# Patient Record
Sex: MALE | Race: WHITE
[De-identification: names, ages, dates, MRNs, and addresses within clinical notes are randomized per-mention and may not be internally consistent; named-entity substitution may affect disease eponyms.]

---

## 2020-08-19 ENCOUNTER — HOSPITAL ENCOUNTER (INPATIENT)
Dept: HOSPITAL 96 - M.ERS | Age: 55
LOS: 2 days | Discharge: HOME | DRG: 61 | End: 2020-08-21
Attending: INTERNAL MEDICINE | Admitting: INTERNAL MEDICINE
Payer: COMMERCIAL

## 2020-08-19 VITALS — DIASTOLIC BLOOD PRESSURE: 88 MMHG | SYSTOLIC BLOOD PRESSURE: 125 MMHG

## 2020-08-19 VITALS — SYSTOLIC BLOOD PRESSURE: 145 MMHG | DIASTOLIC BLOOD PRESSURE: 98 MMHG

## 2020-08-19 VITALS — SYSTOLIC BLOOD PRESSURE: 120 MMHG | DIASTOLIC BLOOD PRESSURE: 85 MMHG

## 2020-08-19 VITALS — DIASTOLIC BLOOD PRESSURE: 90 MMHG | SYSTOLIC BLOOD PRESSURE: 145 MMHG

## 2020-08-19 VITALS — DIASTOLIC BLOOD PRESSURE: 94 MMHG | SYSTOLIC BLOOD PRESSURE: 129 MMHG

## 2020-08-19 VITALS — DIASTOLIC BLOOD PRESSURE: 79 MMHG | SYSTOLIC BLOOD PRESSURE: 135 MMHG

## 2020-08-19 VITALS — DIASTOLIC BLOOD PRESSURE: 88 MMHG | SYSTOLIC BLOOD PRESSURE: 134 MMHG

## 2020-08-19 VITALS — HEIGHT: 70 IN | BODY MASS INDEX: 33.6 KG/M2 | WEIGHT: 234.7 LBS

## 2020-08-19 VITALS — DIASTOLIC BLOOD PRESSURE: 89 MMHG | SYSTOLIC BLOOD PRESSURE: 138 MMHG

## 2020-08-19 VITALS — SYSTOLIC BLOOD PRESSURE: 143 MMHG | DIASTOLIC BLOOD PRESSURE: 101 MMHG

## 2020-08-19 VITALS — DIASTOLIC BLOOD PRESSURE: 81 MMHG | SYSTOLIC BLOOD PRESSURE: 132 MMHG

## 2020-08-19 VITALS — DIASTOLIC BLOOD PRESSURE: 94 MMHG | SYSTOLIC BLOOD PRESSURE: 138 MMHG

## 2020-08-19 VITALS — SYSTOLIC BLOOD PRESSURE: 219 MMHG | DIASTOLIC BLOOD PRESSURE: 132 MMHG

## 2020-08-19 DIAGNOSIS — E66.9: ICD-10-CM

## 2020-08-19 DIAGNOSIS — D75.1: ICD-10-CM

## 2020-08-19 DIAGNOSIS — I16.1: ICD-10-CM

## 2020-08-19 DIAGNOSIS — Z20.828: ICD-10-CM

## 2020-08-19 DIAGNOSIS — Z86.73: ICD-10-CM

## 2020-08-19 DIAGNOSIS — I63.9: Primary | ICD-10-CM

## 2020-08-19 DIAGNOSIS — I12.9: ICD-10-CM

## 2020-08-19 DIAGNOSIS — E11.51: ICD-10-CM

## 2020-08-19 DIAGNOSIS — E11.65: ICD-10-CM

## 2020-08-19 DIAGNOSIS — R27.0: ICD-10-CM

## 2020-08-19 DIAGNOSIS — N18.3: ICD-10-CM

## 2020-08-19 DIAGNOSIS — Z88.8: ICD-10-CM

## 2020-08-19 DIAGNOSIS — E11.40: ICD-10-CM

## 2020-08-19 DIAGNOSIS — E11.22: ICD-10-CM

## 2020-08-19 DIAGNOSIS — N17.0: ICD-10-CM

## 2020-08-19 DIAGNOSIS — Z79.899: ICD-10-CM

## 2020-08-19 DIAGNOSIS — Z79.4: ICD-10-CM

## 2020-08-19 DIAGNOSIS — I65.29: ICD-10-CM

## 2020-08-19 LAB
ABSOLUTE BASOPHILS: 0 THOU/UL (ref 0–0.2)
ABSOLUTE EOSINOPHILS: 0.1 THOU/UL (ref 0–0.7)
ABSOLUTE MONOCYTES: 0.5 THOU/UL (ref 0–1.2)
ALBUMIN SERPL-MCNC: 4.2 G/DL (ref 3.4–5)
ALP SERPL-CCNC: 86 U/L (ref 46–116)
ALT SERPL-CCNC: 21 U/L (ref 30–65)
ANION GAP SERPL CALC-SCNC: 11 MMOL/L (ref 7–16)
APTT BLD: 28.4 SECONDS (ref 25–31.3)
AST SERPL-CCNC: 16 U/L (ref 15–37)
BASOPHILS NFR BLD AUTO: 0.5 %
BILIRUB SERPL-MCNC: 0.5 MG/DL
BUN SERPL-MCNC: 17 MG/DL (ref 7–18)
CALCIUM SERPL-MCNC: 8.9 MG/DL (ref 8.5–10.1)
CHLORIDE SERPL-SCNC: 101 MMOL/L (ref 98–107)
CO2 SERPL-SCNC: 26 MMOL/L (ref 21–32)
CREAT SERPL-MCNC: 1.5 MG/DL (ref 0.6–1.3)
EOSINOPHIL NFR BLD: 2.1 %
FIBRINOGEN PPP-MCNC: 322 MG/DL (ref 200–340)
GLUCOSE SERPL-MCNC: 234 MG/DL (ref 70–99)
GRANULOCYTES NFR BLD MANUAL: 69.2 %
HCT VFR BLD CALC: 54.2 % (ref 42–52)
HGB BLD-MCNC: 19 GM/DL (ref 14–18)
INR PPP: 1.1
LYMPHOCYTES # BLD: 1.4 THOU/UL (ref 0.8–5.3)
LYMPHOCYTES NFR BLD AUTO: 20.9 %
MCH RBC QN AUTO: 30.8 PG (ref 26–34)
MCHC RBC AUTO-ENTMCNC: 35.1 G/DL (ref 28–37)
MCV RBC: 87.7 FL (ref 80–100)
MONOCYTES NFR BLD: 7.3 %
MPV: 7.9 FL. (ref 7.2–11.1)
NEUTROPHILS # BLD: 4.8 THOU/UL (ref 1.6–8.1)
NUCLEATED RBCS: 0 /100WBC
PLATELET COUNT*: 161 THOU/UL (ref 150–400)
POTASSIUM SERPL-SCNC: 3.7 MMOL/L (ref 3.5–5.1)
PROT SERPL-MCNC: 7.5 G/DL (ref 6.4–8.2)
PROTHROMBIN TIME: 11 SECONDS (ref 9.2–11.5)
RBC # BLD AUTO: 6.18 MIL/UL (ref 4.5–6)
RDW-CV: 15 % (ref 10.5–14.5)
SODIUM SERPL-SCNC: 138 MMOL/L (ref 136–145)
WBC # BLD AUTO: 6.9 THOU/UL (ref 4–11)

## 2020-08-20 VITALS — SYSTOLIC BLOOD PRESSURE: 128 MMHG | DIASTOLIC BLOOD PRESSURE: 82 MMHG

## 2020-08-20 VITALS — SYSTOLIC BLOOD PRESSURE: 124 MMHG | DIASTOLIC BLOOD PRESSURE: 82 MMHG

## 2020-08-20 VITALS — DIASTOLIC BLOOD PRESSURE: 86 MMHG | SYSTOLIC BLOOD PRESSURE: 137 MMHG

## 2020-08-20 VITALS — SYSTOLIC BLOOD PRESSURE: 140 MMHG | DIASTOLIC BLOOD PRESSURE: 98 MMHG

## 2020-08-20 VITALS — SYSTOLIC BLOOD PRESSURE: 150 MMHG | DIASTOLIC BLOOD PRESSURE: 96 MMHG

## 2020-08-20 VITALS — SYSTOLIC BLOOD PRESSURE: 145 MMHG | DIASTOLIC BLOOD PRESSURE: 85 MMHG

## 2020-08-20 VITALS — DIASTOLIC BLOOD PRESSURE: 78 MMHG | SYSTOLIC BLOOD PRESSURE: 131 MMHG

## 2020-08-20 VITALS — SYSTOLIC BLOOD PRESSURE: 127 MMHG | DIASTOLIC BLOOD PRESSURE: 84 MMHG

## 2020-08-20 VITALS — DIASTOLIC BLOOD PRESSURE: 68 MMHG | SYSTOLIC BLOOD PRESSURE: 113 MMHG

## 2020-08-20 VITALS — DIASTOLIC BLOOD PRESSURE: 80 MMHG | SYSTOLIC BLOOD PRESSURE: 123 MMHG

## 2020-08-20 VITALS — SYSTOLIC BLOOD PRESSURE: 160 MMHG | DIASTOLIC BLOOD PRESSURE: 103 MMHG

## 2020-08-20 VITALS — SYSTOLIC BLOOD PRESSURE: 162 MMHG | DIASTOLIC BLOOD PRESSURE: 100 MMHG

## 2020-08-20 VITALS — SYSTOLIC BLOOD PRESSURE: 141 MMHG | DIASTOLIC BLOOD PRESSURE: 93 MMHG

## 2020-08-20 VITALS — SYSTOLIC BLOOD PRESSURE: 134 MMHG | DIASTOLIC BLOOD PRESSURE: 85 MMHG

## 2020-08-20 VITALS — SYSTOLIC BLOOD PRESSURE: 176 MMHG | DIASTOLIC BLOOD PRESSURE: 101 MMHG

## 2020-08-20 VITALS — DIASTOLIC BLOOD PRESSURE: 88 MMHG | SYSTOLIC BLOOD PRESSURE: 135 MMHG

## 2020-08-20 VITALS — SYSTOLIC BLOOD PRESSURE: 133 MMHG | DIASTOLIC BLOOD PRESSURE: 74 MMHG

## 2020-08-20 VITALS — SYSTOLIC BLOOD PRESSURE: 148 MMHG | DIASTOLIC BLOOD PRESSURE: 101 MMHG

## 2020-08-20 VITALS — SYSTOLIC BLOOD PRESSURE: 129 MMHG | DIASTOLIC BLOOD PRESSURE: 93 MMHG

## 2020-08-20 VITALS — DIASTOLIC BLOOD PRESSURE: 91 MMHG | SYSTOLIC BLOOD PRESSURE: 153 MMHG

## 2020-08-20 VITALS — DIASTOLIC BLOOD PRESSURE: 87 MMHG | SYSTOLIC BLOOD PRESSURE: 136 MMHG

## 2020-08-20 VITALS — DIASTOLIC BLOOD PRESSURE: 88 MMHG | SYSTOLIC BLOOD PRESSURE: 136 MMHG

## 2020-08-20 VITALS — SYSTOLIC BLOOD PRESSURE: 147 MMHG | DIASTOLIC BLOOD PRESSURE: 88 MMHG

## 2020-08-20 VITALS — SYSTOLIC BLOOD PRESSURE: 139 MMHG | DIASTOLIC BLOOD PRESSURE: 103 MMHG

## 2020-08-20 VITALS — DIASTOLIC BLOOD PRESSURE: 98 MMHG | SYSTOLIC BLOOD PRESSURE: 143 MMHG

## 2020-08-20 VITALS — DIASTOLIC BLOOD PRESSURE: 103 MMHG | SYSTOLIC BLOOD PRESSURE: 162 MMHG

## 2020-08-20 VITALS — DIASTOLIC BLOOD PRESSURE: 67 MMHG | SYSTOLIC BLOOD PRESSURE: 133 MMHG

## 2020-08-20 VITALS — SYSTOLIC BLOOD PRESSURE: 121 MMHG | DIASTOLIC BLOOD PRESSURE: 70 MMHG

## 2020-08-20 VITALS — DIASTOLIC BLOOD PRESSURE: 82 MMHG | SYSTOLIC BLOOD PRESSURE: 144 MMHG

## 2020-08-20 VITALS — SYSTOLIC BLOOD PRESSURE: 125 MMHG | DIASTOLIC BLOOD PRESSURE: 85 MMHG

## 2020-08-20 LAB
ANION GAP SERPL CALC-SCNC: 10 MMOL/L (ref 7–16)
BUN SERPL-MCNC: 16 MG/DL (ref 7–18)
CALCIUM SERPL-MCNC: 8.2 MG/DL (ref 8.5–10.1)
CHLORIDE SERPL-SCNC: 104 MMOL/L (ref 98–107)
CHOLEST SERPL-MCNC: 171 MG/DL (ref ?–200)
CO2 SERPL-SCNC: 27 MMOL/L (ref 21–32)
CREAT SERPL-MCNC: 1.3 MG/DL (ref 0.6–1.3)
EST. AVERAGE GLUCOSE BLD GHB EST-MCNC: 134 MG/DL
GLUCOSE SERPL-MCNC: 154 MG/DL (ref 70–99)
GLYCOHEMOGLOBIN (HGB A1C): 6.3 % (ref 4.8–5.6)
HCT VFR BLD CALC: 49.4 % (ref 42–52)
HDLC SERPL-MCNC: 26 MG/DL (ref 40–?)
HGB BLD-MCNC: 17.4 GM/DL (ref 14–18)
MAGNESIUM SERPL-MCNC: 1.7 MG/DL (ref 1.8–2.4)
MCH RBC QN AUTO: 30.8 PG (ref 26–34)
MCHC RBC AUTO-ENTMCNC: 35.1 G/DL (ref 28–37)
MCV RBC: 87.6 FL (ref 80–100)
MPV: 8.4 FL. (ref 7.2–11.1)
PLATELET COUNT*: 160 THOU/UL (ref 150–400)
POTASSIUM SERPL-SCNC: 3.7 MMOL/L (ref 3.5–5.1)
RBC # BLD AUTO: 5.64 MIL/UL (ref 4.5–6)
RDW-CV: 14.8 % (ref 10.5–14.5)
SERUM ASSESSMENT: (no result)
SODIUM SERPL-SCNC: 141 MMOL/L (ref 136–145)
TC:HDL: 6.6 RATIO
TRIGL SERPL-MCNC: 536 MG/DL (ref ?–150)
VLDLC SERPL CALC-MCNC: 107 MG/DL (ref ?–40)
WBC # BLD AUTO: 6.8 THOU/UL (ref 4–11)

## 2020-08-21 VITALS — SYSTOLIC BLOOD PRESSURE: 138 MMHG | DIASTOLIC BLOOD PRESSURE: 84 MMHG

## 2020-08-21 VITALS — SYSTOLIC BLOOD PRESSURE: 156 MMHG | DIASTOLIC BLOOD PRESSURE: 108 MMHG

## 2020-08-21 VITALS — SYSTOLIC BLOOD PRESSURE: 186 MMHG | DIASTOLIC BLOOD PRESSURE: 117 MMHG

## 2020-08-21 VITALS — SYSTOLIC BLOOD PRESSURE: 154 MMHG | DIASTOLIC BLOOD PRESSURE: 101 MMHG

## 2020-08-21 VITALS — DIASTOLIC BLOOD PRESSURE: 84 MMHG | SYSTOLIC BLOOD PRESSURE: 138 MMHG

## 2020-08-21 VITALS — SYSTOLIC BLOOD PRESSURE: 171 MMHG | DIASTOLIC BLOOD PRESSURE: 108 MMHG

## 2020-08-21 LAB
ANION GAP SERPL CALC-SCNC: 6 MMOL/L (ref 7–16)
BUN SERPL-MCNC: 16 MG/DL (ref 7–18)
CALCIUM SERPL-MCNC: 8.7 MG/DL (ref 8.5–10.1)
CHLORIDE SERPL-SCNC: 103 MMOL/L (ref 98–107)
CO2 SERPL-SCNC: 32 MMOL/L (ref 21–32)
CREAT SERPL-MCNC: 1.5 MG/DL (ref 0.6–1.3)
GLUCOSE SERPL-MCNC: 116 MG/DL (ref 70–99)
HCT VFR BLD CALC: 48.8 % (ref 42–52)
HGB BLD-MCNC: 17 GM/DL (ref 14–18)
MAGNESIUM SERPL-MCNC: 1.8 MG/DL (ref 1.8–2.4)
MCH RBC QN AUTO: 30.6 PG (ref 26–34)
MCHC RBC AUTO-ENTMCNC: 34.9 G/DL (ref 28–37)
MCV RBC: 87.6 FL (ref 80–100)
MPV: 8 FL. (ref 7.2–11.1)
PLATELET COUNT*: 154 THOU/UL (ref 150–400)
POTASSIUM SERPL-SCNC: 3.6 MMOL/L (ref 3.5–5.1)
RBC # BLD AUTO: 5.58 MIL/UL (ref 4.5–6)
RDW-CV: 15.1 % (ref 10.5–14.5)
SODIUM SERPL-SCNC: 141 MMOL/L (ref 136–145)
WBC # BLD AUTO: 6 THOU/UL (ref 4–11)

## 2020-09-25 ENCOUNTER — HOSPITAL ENCOUNTER (OUTPATIENT)
Dept: HOSPITAL 96 - M.MRI | Age: 55
End: 2020-09-25
Attending: FAMILY MEDICINE
Payer: COMMERCIAL

## 2020-09-25 DIAGNOSIS — M48.061: ICD-10-CM

## 2020-09-25 DIAGNOSIS — M51.27: Primary | ICD-10-CM

## 2020-09-25 DIAGNOSIS — M47.817: ICD-10-CM

## 2020-09-25 DIAGNOSIS — M25.78: ICD-10-CM

## 2020-10-12 ENCOUNTER — HOSPITAL ENCOUNTER (OUTPATIENT)
Dept: HOSPITAL 96 - M.PC | Age: 55
End: 2020-10-12
Attending: PHYSICAL MEDICINE & REHABILITATION
Payer: COMMERCIAL

## 2020-10-12 DIAGNOSIS — M54.5: Primary | ICD-10-CM

## 2020-10-29 ENCOUNTER — HOSPITAL ENCOUNTER (OUTPATIENT)
Dept: HOSPITAL 61 - PNCL | Age: 55
End: 2020-10-29
Attending: ANESTHESIOLOGY
Payer: COMMERCIAL

## 2020-10-29 DIAGNOSIS — Z79.82: ICD-10-CM

## 2020-10-29 DIAGNOSIS — Z79.899: ICD-10-CM

## 2020-10-29 DIAGNOSIS — M48.061: Primary | ICD-10-CM

## 2020-10-29 DIAGNOSIS — M79.604: ICD-10-CM

## 2020-10-29 DIAGNOSIS — Z79.84: ICD-10-CM

## 2020-10-29 DIAGNOSIS — Z82.49: ICD-10-CM

## 2020-10-29 DIAGNOSIS — E11.9: ICD-10-CM

## 2020-10-29 DIAGNOSIS — M19.90: ICD-10-CM

## 2020-10-29 DIAGNOSIS — Z83.3: ICD-10-CM

## 2020-10-29 DIAGNOSIS — I10: ICD-10-CM

## 2020-10-29 DIAGNOSIS — I73.89: ICD-10-CM

## 2020-10-29 PROCEDURE — 62323 NJX INTERLAMINAR LMBR/SAC: CPT

## 2020-10-29 NOTE — PDOC1
INITIAL PAIN CONSULT


DATE OF SERVICE:


DOS:


DATE: 10/29/20 


TIME: 12:57





CHIEF COMPLAINT:


Chief Complaint:


Low back and right lower extremity pain





HISTORY OF PRESENT ILLNESS:


55-year-old male presents history of pain in the low back and right lower 

extremity for about 1 to 2 years.  Patient reports no specific injury or 

accident that he is aware of but has significant pain in the low back and right 

lower extremity.  Patient had lumbar laminectomy in 2016 in [place] which did 

very well for several years after the procedure but now the pain is returning 

similar to that what it was previously.  Patient reports the pain is in the low 

back right lower extremity posterior gluteus posterior thigh lateral thigh 

anterior thigh groin on the right side into the medial knee on the right as 

well.  Patient reports no loss of motor function describes the pain is shooting 

intermittent intensity and severity changes during the day with walking standing

changing positions as a radiating pain in the low back and right leg burning 

sensation in the back and leg as well.  Patient which is worse with walking 

standing changing positions better with sitting or laying down but does awaken 

him sleep release once or twice a night does not affect his bowel bladder 

control but does affect his ability to walk although he is not using any 

assistive devices such as canes or walkers to ambulate.  Patient has been taking

Tylenol 3 which has not been very helpful also tizanidine which has not 

decreased the pain as well.  Rates his disability rating 0-10 10 me the worst is

a 7 with family home responsibilities social activity occupation 8 with 

recreation and sexual behavior 5 with self-care and 6 with life support 

activities.  Did have an MRI scan lumbar spine dated September 25, 2020 showing 

L3-4 right neuroforaminal broad-based disc protrusion with mild to moderate 

right neuroforaminal stenosis compromising the anterior inferior margin of the 

exiting right L3 nerve root.  Patient reports no loss of motor function but 

significant fatigability the right lower extremity specially with ambulation.





PAST MEDICAL HISTORY:


PMH:


Diabetes, hypertension, peripheral vascular disease, arthritis





PREVIOUS SURGERIES:


Past Surgical Hx:


Lumbar laminectomy 2016, left carotid endarterectomy, cyst removed from the 

brain x2





CURRENT MEDICATIONS:


Current Meds:


See patients chart





ALLERGIES;


Allergies:  


Coded Allergies:  


     No Known Drug Allergies (Unverified , 10/29/20)





FAMILY HISTORY:


Family Hx:


Diabetes, arthritis, hypertension





SOCIAL HISTORY:


Social Hx:


Patient drinks alcohol 1 or 2 drinks a week does not smoke not use any illegal 

illicit recreational drugs is single lives locally in [facility] and 

works as an 





REVIEW OF SYSTEMS:


ROS:


Positive for those items mentioned in history of present illness, all systems 

are reviewed, otherwise negative, is complete full and well-documented on 

patient's chart





PHYSICAL EXAM:


VS:


Blood pressure is 130/95 pulse 80 respirations 18 temperature 97.9 F height is 

5 foot 10 inches weight is 241 pounds


PE:


PHYSICAL EXAMINATION:





GENERAL: The patient is awake, alert, oriented, appropriate, very pleasant 

demeanor


HEENT: Shows normocephalic, atraumatic.  Extraocular movements are intact and 

symmetrical.  Oral cavity: Mucous membranes moist and pink.  Dentition is 

intact.


NECK: Shows anterior throat supple without palpable lymphadenopathy noted.  

Swallow reflex symmetrical.


CHEST: Shows normal on inspection.  Breath sounds are clear bilaterally, no 

rales rhonchi or wheezes.


HEART: Shows S1, S2 clear.  No murmurs auscultated.


ABDOMEN: Soft, nontender, nondistended, obese.  No palpable organomegaly is 

noted.  No rebound or guarding demonstrated.


BACK: Shows spine grossly in the midline.  Normal-appearing cervical lordotic 

curvature.  There is slightly increased thoracic kyphosis, some minor flattening

of the lumbar lordotic curvature.  Well-healed midline surgical scar is noted.  

Lumbar paraspinous muscles show symmetrical on inspection, on palpation shows 

some moderate tenderness diffusely throughout the upper, middle and lower 

distribution of the paraspinous muscles bilaterally and also into the lower 

thoracic paraspinous musculature, firm and tender, but without specific trigger 

points, without radiation of pain.  The patient has good rotational motion of 

the lumbar spine, both laterally as well as extension and flexion without 

significant difficulty.  No tenderness over the spinous processes, sacrum or 

sacroiliac regions.


EXTREMITIES: Lower extremities show deep tendon reflexes 2+ in the patellar and 

tendo calcaneus tendons.  Motor exam is 4 on a scale of 5 with right 

dorsiflexion, extension, quadriceps and hamstring flexion and 5/5 on the left.  

Peripheral pulses are 1+ posterior tibial.  No peripheral edema is noted 

bilaterally.  Lower extremities are warm and dry to touch, equal in color and 

appearance.  Straight leg raise noted to be positive on the right about 45 

degrees, left side is negative.  Gaenslen's and Pa's maneuvers are negative

as well.  The patient is able to stand, stand on his toes without significant 

difficulty or loss of balance walks with a fairly normal-appearing gait does not

appear to favor the right or left lower extremity significantly not use any 

assistive devices to ambulate.


SKIN: Shows warm and dry, good turgor.  No edema.  No sores, rashes or bruising 

throughout.





IMPRESSION:


Impression:


55-year-old male with 1 to 2-year history of low back right lower extremity pain

and radicular fashion


Previous lumbar laminectomy


Diabetes


Hypertension


Arthritis





Plan: Options were discussed with the patient pleaded conservative medical 

management physical therapies interventional techniques.  Patient would like to 

pursue interventional techniques.  We discussed a lumbar epidural steroid 

injection using description as well as anatomical models to describe the 

procedure.  Risks were discussed including but not limited to: Bleeding, 

infection, possibility of epidural hematoma and subsequent neurological 

compromise, dural puncture, headaches, spinal cord and/or nerve damage, side 

effects of steroid medication, and poor results regarding pain control.  Patient

understands wished to proceed.  Patient will return to clinic in approximate 2 

weeks for follow-up was counseled as to return appointment activity level and 

side effects to be aware of.





Procedure is lumbar epidural steroid injection under local anesthetic using 

sterile prep and drape at the L3-4 level using C-arm fluoroscopic guidance in 

both AP and lateral views medications injected is 120 mg Depo-Medrol + 10 mL pre

servative-free normal saline and 2 mL contrast- condition at discharge is stable

patient tolerated procedure well had no complications.











HAYDEN JULIEN MD               Oct 29, 2020 13:04

## 2020-12-04 ENCOUNTER — HOSPITAL ENCOUNTER (OUTPATIENT)
Dept: HOSPITAL 61 - PNCL | Age: 55
Discharge: HOME | End: 2020-12-04
Attending: ANESTHESIOLOGY
Payer: COMMERCIAL

## 2020-12-04 DIAGNOSIS — M96.1: ICD-10-CM

## 2020-12-04 DIAGNOSIS — Z79.899: ICD-10-CM

## 2020-12-04 DIAGNOSIS — Z79.82: ICD-10-CM

## 2020-12-04 DIAGNOSIS — M51.16: Primary | ICD-10-CM

## 2020-12-04 PROCEDURE — 62323 NJX INTERLAMINAR LMBR/SAC: CPT

## 2020-12-04 NOTE — PDOC
Progress Note - Pain Clinic


Date of Service:


DOS:


DATE: 12/4/20 


TIME: 08:16





Diagnosis:


Dx:


Lumbar radiculopathy with lumbar degenerative disc disease and lumbar 

postlaminectomy syndrome





History or Present Illness:


HPI:


55-year-old male returns follow-up status post lumbar epidural injection x1 last

seen October 29, 2020.  Patient reports he did fairly well after few days it was

sore with about 50% improvement for about 2 weeks after that patient ports pain 

returning now in the low back and the right lower extremity posterior gluteus 

lateral thigh anterior thigh medial thigh and posterior thigh patient ports that

sharp and shooting tingling burning stabbing radiating at times as well in the 

right lower extremity.  Patient ports a 9 on scale 10 is worse over the past 

week 6 on average 3 at its least and is a 6 today.  Patient reports initially 

was doing much better with walking changing positions doing household activities

and traveling with greater ease and comfort now it is returning in the right 

lower extremity and low back occasionally wakes him from sleep at night but not 

most nights.  Patient reports no new motor or sensory deficits no new bowel or 

bladder con's or other complaints.





Physical Exam:


VS:


Blood pressure is 111/88 pulse 83 respirations 18 temperature 98.9 F height is 

5 foot 10 inches weight is 235 pounds


PE:


PHYSICAL EXAMINATION:





GENERAL: The patient is awake, alert, oriented, appropriate, very pleasant 

demeanor


HEENT: Shows normocephalic, atraumatic.  Extraocular movements are intact and 

symmetrical.  Oral cavity: Mucous membranes moist and pink. 


NECK: Shows anterior throat supple without palpable lymphadenopathy noted.  

Swallow reflex symmetrical.


CHEST: Shows normal on inspection.  Breath sounds are clear bilaterally.


HEART: Shows S1, S2 clear.  No murmurs auscultated.


ABDOMEN: Soft, nontender, nondistended, obese.  No palpable organomegaly is 

noted.  No rebound or guarding demonstrated.


BACK: Shows spine grossly in the midline.  Normal-appearing cervical lordotic 

curvature.  There is slightly increased thoracic kyphosis, some minor flattening

of the lumbar lordotic curvature.  Lumbar paraspinous muscles show symmetrical 

on inspection, on palpation shows some moderate tenderness diffusely throughout 

the upper, middle and lower distribution of the paraspinous muscles, but without

specific trigger points, without radiation of pain.  The patient has good 

rotational motion of the lumbar spine, both laterally as well as extension and 

flexion without significant difficulty.  No tenderness over the spinous 

processes, sacrum or sacroiliac regions.


EXTREMITIES: Lower extremities show deep tendon reflexes 2+ in the patellar and 

tendo calcaneus tendons.  Motor exam is 4 on a scale of 5 with right 

dorsiflexion, extension, quadriceps and hamstring flexion and 5/5 on the left.  

Peripheral pulses are 1+ posterior tibial.  No peripheral edema is noted 

bilaterally.  Lower extremities are warm and dry to touch, equal in color and 

appearance.


SKIN: Shows warm and dry, good turgor.  No edema.  No sores, rashes or bruising 

throughout.





Procedure:


Procedure:


Options discussed with the patient.  Patient chart was reviewed his his current 

medication regimen updated current review of systems updated today as well.  We 

will proceed with a lumbar epidural steroid injection as a second in the series 

today with fluoroscopic guidance.  Risks were discussed including but not 

limited to: Bleeding, infection, possibility of epidural hematoma and subsequent

neurological compromise, dural puncture, headaches, spinal cord and/or nerve 

damage, side effects of steroid medication, and poor results regarding pain 

control.  Patient understands wished to proceed.  Patient return to clinic in 

approximate 2 weeks for follow-up was counseled as return appointment activity 

level and side effects be aware of.





Medication Injected:


Med Injected:


Procedure is lumbar epidural steroid injection under local anesthetic using 

sterile prep and drape at the L3-4 level using C-arm fluoroscopic guidance in 

both AP and lateral views medications injected is 120 mg Depo-Medrol + 10 mL 

preservative-free normal saline and 2 mL contrast- condition at discharge is 

stable patient tolerated procedure well had no complications.





Condition at Discharge:


Condition at Discharge:


Condition at discharge is stable, patient tolerated procedure well and had no 

complications.











HAYDEN JULIEN MD                Dec 4, 2020 08:19

## 2021-03-05 ENCOUNTER — HOSPITAL ENCOUNTER (OUTPATIENT)
Dept: HOSPITAL 96 - M.RAD | Age: 56
End: 2021-03-05
Attending: FAMILY MEDICINE
Payer: COMMERCIAL

## 2021-03-05 DIAGNOSIS — M79.671: ICD-10-CM
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
## 2021-03-05 DIAGNOSIS — M79.89: ICD-10-CM
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
## 2021-03-05 DIAGNOSIS — M25.774: ICD-10-CM

## 2021-03-05 DIAGNOSIS — M19.071: ICD-10-CM

## 2021-03-05 DIAGNOSIS — S99.921A: Primary | ICD-10-CM
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
## 2021-10-27 ENCOUNTER — HOSPITAL ENCOUNTER (OUTPATIENT)
Dept: HOSPITAL 61 - SURGPAT | Age: 56
End: 2021-10-27
Attending: NEUROLOGICAL SURGERY
Payer: COMMERCIAL

## 2021-10-27 VITALS — SYSTOLIC BLOOD PRESSURE: 158 MMHG | DIASTOLIC BLOOD PRESSURE: 99 MMHG

## 2021-10-27 DIAGNOSIS — M51.16: ICD-10-CM

## 2021-10-27 DIAGNOSIS — Z01.818: Primary | ICD-10-CM

## 2021-10-27 LAB
ALBUMIN SERPL-MCNC: 4.5 G/DL (ref 3.4–5)
ALBUMIN/GLOB SERPL: 1.4 {RATIO} (ref 1–1.7)
ALP SERPL-CCNC: 89 U/L (ref 46–116)
ALT SERPL-CCNC: 24 U/L (ref 16–63)
ANION GAP SERPL CALC-SCNC: 7 MMOL/L (ref 6–14)
AST SERPL-CCNC: 20 U/L (ref 15–37)
BASOPHILS # BLD AUTO: 0.1 X10^3/UL (ref 0–0.2)
BASOPHILS NFR BLD: 1 % (ref 0–3)
BILIRUB SERPL-MCNC: 0.5 MG/DL (ref 0.2–1)
BUN SERPL-MCNC: 17 MG/DL (ref 8–26)
BUN/CREAT SERPL: 11 (ref 6–20)
CALCIUM SERPL-MCNC: 9 MG/DL (ref 8.5–10.1)
CHLORIDE SERPL-SCNC: 100 MMOL/L (ref 98–107)
CO2 SERPL-SCNC: 31 MMOL/L (ref 21–32)
CREAT SERPL-MCNC: 1.6 MG/DL (ref 0.7–1.3)
EOSINOPHIL NFR BLD: 0.2 X10^3/UL (ref 0–0.7)
EOSINOPHIL NFR BLD: 2 % (ref 0–3)
ERYTHROCYTE [DISTWIDTH] IN BLOOD BY AUTOMATED COUNT: 14.7 % (ref 11.5–14.5)
GFR SERPLBLD BASED ON 1.73 SQ M-ARVRAT: 45.1 ML/MIN
GLUCOSE SERPL-MCNC: 200 MG/DL (ref 70–99)
HCT VFR BLD CALC: 55.2 % (ref 39–53)
HGB BLD-MCNC: 19.2 G/DL (ref 13–17.5)
LYMPHOCYTES # BLD: 1.4 X10^3/UL (ref 1–4.8)
LYMPHOCYTES NFR BLD AUTO: 21 % (ref 24–48)
MCH RBC QN AUTO: 30 PG (ref 25–35)
MCHC RBC AUTO-ENTMCNC: 35 G/DL (ref 31–37)
MCV RBC AUTO: 87 FL (ref 79–100)
MONO #: 0.6 X10^3/UL (ref 0–1.1)
MONOCYTES NFR BLD: 8 % (ref 0–9)
NEUT #: 4.4 X10^3/UL (ref 1.8–7.7)
NEUTROPHILS NFR BLD AUTO: 67 % (ref 31–73)
PLATELET # BLD AUTO: 175 X10^3/UL (ref 140–400)
POTASSIUM SERPL-SCNC: 4 MMOL/L (ref 3.5–5.1)
PROT SERPL-MCNC: 7.7 G/DL (ref 6.4–8.2)
RBC # BLD AUTO: 6.38 X10^6/UL (ref 4.3–5.7)
SODIUM SERPL-SCNC: 138 MMOL/L (ref 136–145)
WBC # BLD AUTO: 6.6 X10^3/UL (ref 4–11)

## 2021-10-27 PROCEDURE — 87641 MR-STAPH DNA AMP PROBE: CPT

## 2021-10-27 PROCEDURE — 80053 COMPREHEN METABOLIC PANEL: CPT

## 2021-10-27 PROCEDURE — 93005 ELECTROCARDIOGRAM TRACING: CPT

## 2021-10-27 PROCEDURE — 83036 HEMOGLOBIN GLYCOSYLATED A1C: CPT

## 2021-10-27 PROCEDURE — 85025 COMPLETE CBC W/AUTO DIFF WBC: CPT

## 2021-10-27 PROCEDURE — 36415 COLL VENOUS BLD VENIPUNCTURE: CPT

## 2021-10-27 NOTE — EKG
[facility]

              [address] [place] [address]

Test Date:    2021-10-27               Test Time:    13:36:38

Pat Name:     SANDRA FRIED             Department:   

Patient ID:   [id number]           Room:          

Gender:       M                        Technician:   REFUGIO

:          1965               Requested By: TUNDE COPE

Order Number: [id number]           Reading MD:   Todd Jones MD

                                 Measurements

Intervals                              Axis          

Rate:         82                       P:            18

WY:           154                      QRS:          37

QRSD:         94                       T:            21

QT:           370                                    

QTc:          435                                    

                           Interpretive Statements

SINUS RHYTHM

POOR R WAVE PROGRESSION, CONSIDER LEAD PLACEMENT

NON-SPECIFIC ST/T CHANGES

Electronically Signed On 10- 10:20:25 CDT by Todd Jones MD

## 2021-10-28 LAB — HBA1C MFR BLD: 10.4 % (ref 4.8–5.6)

## 2021-11-03 ENCOUNTER — HOSPITAL ENCOUNTER (OUTPATIENT)
Dept: HOSPITAL 61 - SURG | Age: 56
Setting detail: OBSERVATION
LOS: 1 days | Discharge: HOME | End: 2021-11-04
Attending: NEUROLOGICAL SURGERY | Admitting: NEUROLOGICAL SURGERY
Payer: COMMERCIAL

## 2021-11-03 VITALS — DIASTOLIC BLOOD PRESSURE: 92 MMHG | SYSTOLIC BLOOD PRESSURE: 135 MMHG

## 2021-11-03 VITALS — SYSTOLIC BLOOD PRESSURE: 130 MMHG | DIASTOLIC BLOOD PRESSURE: 96 MMHG

## 2021-11-03 VITALS — HEIGHT: 70 IN | WEIGHT: 235.45 LBS | BODY MASS INDEX: 33.71 KG/M2

## 2021-11-03 VITALS — DIASTOLIC BLOOD PRESSURE: 79 MMHG | SYSTOLIC BLOOD PRESSURE: 129 MMHG

## 2021-11-03 VITALS — DIASTOLIC BLOOD PRESSURE: 91 MMHG | SYSTOLIC BLOOD PRESSURE: 134 MMHG

## 2021-11-03 VITALS — SYSTOLIC BLOOD PRESSURE: 150 MMHG | DIASTOLIC BLOOD PRESSURE: 106 MMHG

## 2021-11-03 VITALS — DIASTOLIC BLOOD PRESSURE: 77 MMHG | SYSTOLIC BLOOD PRESSURE: 115 MMHG

## 2021-11-03 VITALS — DIASTOLIC BLOOD PRESSURE: 60 MMHG | SYSTOLIC BLOOD PRESSURE: 125 MMHG

## 2021-11-03 VITALS — DIASTOLIC BLOOD PRESSURE: 84 MMHG | SYSTOLIC BLOOD PRESSURE: 131 MMHG

## 2021-11-03 DIAGNOSIS — Z79.899: ICD-10-CM

## 2021-11-03 DIAGNOSIS — M51.16: Primary | ICD-10-CM

## 2021-11-03 DIAGNOSIS — Z98.890: ICD-10-CM

## 2021-11-03 DIAGNOSIS — Z79.82: ICD-10-CM

## 2021-11-03 PROCEDURE — 96372 THER/PROPH/DIAG INJ SC/IM: CPT

## 2021-11-03 PROCEDURE — 96376 TX/PRO/DX INJ SAME DRUG ADON: CPT

## 2021-11-03 PROCEDURE — A6258 TRANSPARENT FILM >16<=48 IN: HCPCS

## 2021-11-03 PROCEDURE — 96374 THER/PROPH/DIAG INJ IV PUSH: CPT

## 2021-11-03 PROCEDURE — 82962 GLUCOSE BLOOD TEST: CPT

## 2021-11-03 PROCEDURE — G0379 DIRECT REFER HOSPITAL OBSERV: HCPCS

## 2021-11-03 PROCEDURE — A4930 STERILE, GLOVES PER PAIR: HCPCS

## 2021-11-03 PROCEDURE — A6254 ABSORPT DRG <=16 SQ IN W/BDR: HCPCS

## 2021-11-03 PROCEDURE — 97530 THERAPEUTIC ACTIVITIES: CPT

## 2021-11-03 PROCEDURE — 63035 LAMOT DCMPRN NRV RT EA ADDL: CPT

## 2021-11-03 PROCEDURE — 63030 LAMOT DCMPRN NRV RT 1 LMBR: CPT

## 2021-11-03 PROCEDURE — 76000 FLUOROSCOPY <1 HR PHYS/QHP: CPT

## 2021-11-03 PROCEDURE — 88304 TISSUE EXAM BY PATHOLOGIST: CPT

## 2021-11-03 PROCEDURE — 88311 DECALCIFY TISSUE: CPT

## 2021-11-03 PROCEDURE — G0378 HOSPITAL OBSERVATION PER HR: HCPCS

## 2021-11-03 PROCEDURE — 97162 PT EVAL MOD COMPLEX 30 MIN: CPT

## 2021-11-03 PROCEDURE — A4222 INFUSION SUPPLIES WITH PUMP: HCPCS

## 2021-11-03 PROCEDURE — A4364 ADHESIVE, LIQUID OR EQUAL: HCPCS

## 2021-11-03 PROCEDURE — 97116 GAIT TRAINING THERAPY: CPT

## 2021-11-03 RX ADMIN — PROBENECID SCH MG: 500 TABLET, FILM COATED ORAL at 22:12

## 2021-11-03 RX ADMIN — FENTANYL CITRATE PRN MCG: 50 INJECTION INTRAMUSCULAR; INTRAVENOUS at 15:05

## 2021-11-03 RX ADMIN — DOCUSATE SODIUM SCH MG: 100 CAPSULE, LIQUID FILLED ORAL at 22:12

## 2021-11-03 RX ADMIN — DEXTROSE, SODIUM CHLORIDE, AND POTASSIUM CHLORIDE SCH MLS/HR: 5; .45; .15 INJECTION INTRAVENOUS at 15:15

## 2021-11-03 RX ADMIN — HYDROMORPHONE HYDROCHLORIDE PRN MG: 2 INJECTION INTRAMUSCULAR; INTRAVENOUS; SUBCUTANEOUS at 16:18

## 2021-11-03 RX ADMIN — HYDROMORPHONE HYDROCHLORIDE PRN MG: 2 INJECTION INTRAMUSCULAR; INTRAVENOUS; SUBCUTANEOUS at 16:26

## 2021-11-03 RX ADMIN — MORPHINE SULFATE PRN MG: 2 INJECTION, SOLUTION INTRAMUSCULAR; INTRAVENOUS at 15:21

## 2021-11-03 RX ADMIN — HYDROMORPHONE HYDROCHLORIDE PRN MG: 2 INJECTION INTRAMUSCULAR; INTRAVENOUS; SUBCUTANEOUS at 16:40

## 2021-11-03 RX ADMIN — ZOLPIDEM TARTRATE PRN MG: 5 TABLET ORAL at 23:51

## 2021-11-03 RX ADMIN — FENTANYL CITRATE PRN MCG: 50 INJECTION INTRAMUSCULAR; INTRAVENOUS at 19:28

## 2021-11-03 RX ADMIN — MORPHINE SULFATE PRN MG: 2 INJECTION, SOLUTION INTRAMUSCULAR; INTRAVENOUS at 15:30

## 2021-11-03 RX ADMIN — INSULIN LISPRO PRN UNIT: 100 INJECTION, SOLUTION INTRAVENOUS; SUBCUTANEOUS at 15:06

## 2021-11-03 RX ADMIN — INSULIN LISPRO PRN UNIT: 100 INJECTION, SOLUTION INTRAVENOUS; SUBCUTANEOUS at 16:25

## 2021-11-03 RX ADMIN — HYDROMORPHONE HYDROCHLORIDE PRN MG: 2 INJECTION INTRAMUSCULAR; INTRAVENOUS; SUBCUTANEOUS at 16:02

## 2021-11-03 RX ADMIN — METOPROLOL TARTRATE SCH MG: 50 TABLET, FILM COATED ORAL at 21:00

## 2021-11-03 RX ADMIN — METHOCARBAMOL PRN MG: 750 TABLET ORAL at 22:11

## 2021-11-03 RX ADMIN — LISINOPRIL SCH MG: 20 TABLET ORAL at 15:00

## 2021-11-03 RX ADMIN — FENTANYL CITRATE PRN MCG: 50 INJECTION INTRAMUSCULAR; INTRAVENOUS at 15:11

## 2021-11-03 RX ADMIN — ZOLPIDEM TARTRATE PRN MG: 5 TABLET ORAL at 22:12

## 2021-11-03 NOTE — DISCH
DISCHARGE INSTRUCTIONS


Condition on Discharge


Condition on Discharge:  Stable





Activity After Discharge


Activity Instructions for Disc:  Activity as tolerated, Avoid exertion


Other activity instructions:  no driving for a week


Bathing Instructions:  Shower-keep dressing dry, No Tub Bath until see 


Lifting Instructions after Dis:  No heavy lifting, No pulling or pushing, Do not

lift >10 pounds





Diet after Discharge


Additional Diet Restrictions:  resume home diet





Wound Incision Care


Wound/Incision Care:  Ice to area for comfort


Other wound/incision instructi:  may remove dressing in 48 hours if dry then may

shower, no soaking





Contacting the  after DC


Call your doctor for:  Concerns you may have





Follow-Up


Follow up with:  Dr. Cope's nurse in 2 weeks [phone]











TUNDE COPE MD             Nov 3, 2021 14:51

## 2021-11-03 NOTE — NUR
Arrived to unit by cart from PACU. Alert and oriented x's 4. Able to ambulated from cart to 
bed with assist x's 1. Dressing intact with some drainage noted. Saline lock on left handl. 
WATSON's on bilaterally. Able to move all extremities easily. Still has sl pain on right leg 
but better than before surgery. Got in bed and soon after was uncomfortable. Pt up in chair 
with ice pack to incision site. Stated felt more comfortable. Call light in reach. Cont. 
monitor.

## 2021-11-03 NOTE — OP
DATE OF SURGERY: 11/03/2021

PREOPERATIVE DIAGNOSIS:   Herniated lumbar L3-L4 with large superior fragment.



POSTOPERATIVE DIAGNOSIS:  Herniated lumbar disc, L3-L4 with large superior 

fragment.



OPERATION PERFORMED:  Hemilaminotomy and microdiscectomy, L3-L4, right.  The 

operation was done with EMG monitoring, SSEP monitoring, fluoroscopy, 

microscopic dissection.



SPECIMEN: Disc and decompression.



SURGEON: Dario Lee M.D.



ASSISTANT:  DIAN Jaeger, assisted with the surgery.  She assisted with 

exposure, the microdiscectomy and microdecompression as well as the closure.



OPERATIVE INDICATIONS:  The patient is a pleasant 56-year-old man who developed 

intractable back and right leg pain.  He underwent epidural steroid injections 

and the leg pain has slowly decreased in severity.  He developed continued right

sided lower back pain which could radiate down into the buttock and lateral hip 

region.  The pain did not resolve and the disc was very large with the large 

superior fragment, I recommended lumbar microdecompression.  I spoke to him 

about the surgery and the risks.  He understood and he wished to go ahead.



DESCRIPTION OF PROCEDURE:  Following general endotracheal anesthesia, the 

patient was positioned prone on the Jamil table.  Lumbar region prepped and 

draped in standard fashion.  WATSON hose and AV impulse boots were applied for DVT

prophylaxis. 

The microscope was draped, fluoroscopy was draped and brought into the field.. 

Monitoring was established.  Ancef 2 grams given less than one hour prior to 

initiation of surgery.  Using fluoroscopic guidance, I made an incision directly

over the L3-L4 interspace.  I dissected down through skin and subcutaneous 

tissue, dissected down to the lumbodorsal fascia, which I incised the right side

of midline.  I dissected down and reflected the paraspinal muscles and placed 

[place] microdisk retractor, brought in the microscope and the remainder of the 

surgery was done with microscope using microscopic technique, I burred down a 

generous hemilaminotomy.  I trimmed away very thickened ligamentum flavum.  I 

went above this region to expose the origin of the L3 root and then I did    

perform foraminotomy.  After peeling away the very thickened ligamentum 

flavum, I could see the dura very well and the L3 root around at the pedicle 

and moved laterally.  There was a large superiorly herniated disc fragment, 

which I began to remove in multiple pieces as I worked, I was able to get an 

excellent decompression. I went to the level of disc space and continued to 

remove disc material and as I worked, the area was very well decompressed.  I 

irrigated copiously with antibiotic solution.  I had excellent hemostasis 

throughout.  I removed the retractors and obtained hemostasis in the muscle and 

irrigated copiously and then I closed the wound in layers with absorbable 

suture.  The skin was closed with 4-0 subcuticular stitch.  I felt the surgery 
went 

very well.







ISRAEL/JOY

DR: Navi   DD: 11/03/2021 14:33

DT: 11/03/2021 14:49   TID: [id number]

[id number]

## 2021-11-04 VITALS — DIASTOLIC BLOOD PRESSURE: 57 MMHG | SYSTOLIC BLOOD PRESSURE: 99 MMHG

## 2021-11-04 VITALS — DIASTOLIC BLOOD PRESSURE: 53 MMHG | SYSTOLIC BLOOD PRESSURE: 115 MMHG

## 2021-11-04 VITALS — DIASTOLIC BLOOD PRESSURE: 56 MMHG | SYSTOLIC BLOOD PRESSURE: 107 MMHG

## 2021-11-04 RX ADMIN — LISINOPRIL SCH MG: 20 TABLET ORAL at 09:00

## 2021-11-04 RX ADMIN — DEXTROSE, SODIUM CHLORIDE, AND POTASSIUM CHLORIDE SCH MLS/HR: 5; .45; .15 INJECTION INTRAVENOUS at 04:35

## 2021-11-04 RX ADMIN — FENTANYL CITRATE PRN MCG: 50 INJECTION INTRAMUSCULAR; INTRAVENOUS at 11:24

## 2021-11-04 RX ADMIN — METHOCARBAMOL PRN MG: 750 TABLET ORAL at 09:01

## 2021-11-04 RX ADMIN — FENTANYL CITRATE PRN MCG: 50 INJECTION INTRAMUSCULAR; INTRAVENOUS at 03:07

## 2021-11-04 RX ADMIN — PROBENECID SCH MG: 500 TABLET, FILM COATED ORAL at 09:01

## 2021-11-04 RX ADMIN — DOCUSATE SODIUM SCH MG: 100 CAPSULE, LIQUID FILLED ORAL at 09:00

## 2021-11-04 RX ADMIN — FENTANYL CITRATE PRN MCG: 50 INJECTION INTRAMUSCULAR; INTRAVENOUS at 09:04

## 2021-11-04 RX ADMIN — METOPROLOL TARTRATE SCH MG: 50 TABLET, FILM COATED ORAL at 09:00

## 2021-11-04 NOTE — DS
DATE OF DISCHARGE: 11/04/2021

DISCHARGE DIAGNOSIS:  Herniated lumbar disc, L3-L4 with large superior fragment.



OPERATIONS PERFORMED:  Hemilaminotomy and microdiscectomy L3-L4, right.



HISTORY OF PRESENT ILLNESS:  The patient is a pleasant 56-year-old man who 

developed intractable back and right leg pain.  He underwent epidural steroid 

injections and the leg pain slowly decreased in severity.  He continued to have 

right-sided lower back pain, which would radiate to the buttock and lateral hip 

region.  The pain did not resolve and the disc was very large with the large 

superior fragment and I recommended lumbar microdecompressive surgery.  I spoke 

with him about the surgery and the risks.  He understood and wished to go ahead.



HOSPITAL COURSE:  He was admitted to the floor postoperatively where he has done

well.  He is up ambulating in the room and in the halls with physical therapy.  

Instruction was given to him regarding his activities.  His pain is well 

controlled with his medications and he is in good condition to discharge home.



DISCHARGE MEDICATIONS:  He will resume his medications per the MRAD.



DISCHARGE INSTRUCTIONS:  He was instructed regarding incision care, activity 

restrictions and expectations for the next several weeks.  He will follow up in 

our office in 2 weeks.  He understands to call with questions or concerns.







PAMELA

DR: Aniyah   DD: 11/04/2021 10:21

DT: 11/04/2021 10:34   TID: [id number]

## 2021-11-04 NOTE — NUR
resting in bed. original dressing removed cleansed with chlor prep then telfa island 
applied. up to recliner medicated with fentanyl for complaints of pain. hospital pharmacy 
does not carry his diabetic medication; called for a sliding scale. ate large breakfast

-------------------------------------------------------------------------------

Addendum: 11/04/21 at 1135 by PEPE BROWN RN

-------------------------------------------------------------------------------

he was given sliding scale of 5 units.

## 2021-11-04 NOTE — NUR
given another 5 units of insulin for his blood sugar. saline lock removed. neha here 
reviewed written discgharge instructions; verbalized understanding of thesehe is rating his 
pain "8" mediated with lortab

-------------------------------------------------------------------------------

Addendum: 11/04/21 at 1202 by PEPE BROWN RN

-------------------------------------------------------------------------------

jono given fentanyl 50 mcg 20 min prior to dismissal. wants to go home

## 2021-11-16 ENCOUNTER — HOSPITAL ENCOUNTER (INPATIENT)
Dept: HOSPITAL 61 - 4 NORTH | Age: 56
LOS: 8 days | Discharge: HOME HEALTH | DRG: 862 | End: 2021-11-24
Attending: NEUROLOGICAL SURGERY | Admitting: NEUROLOGICAL SURGERY
Payer: COMMERCIAL

## 2021-11-16 VITALS — SYSTOLIC BLOOD PRESSURE: 141 MMHG | DIASTOLIC BLOOD PRESSURE: 100 MMHG

## 2021-11-16 VITALS — WEIGHT: 233.69 LBS | BODY MASS INDEX: 33.46 KG/M2 | HEIGHT: 70 IN

## 2021-11-16 VITALS — SYSTOLIC BLOOD PRESSURE: 131 MMHG | DIASTOLIC BLOOD PRESSURE: 76 MMHG

## 2021-11-16 VITALS — DIASTOLIC BLOOD PRESSURE: 112 MMHG | SYSTOLIC BLOOD PRESSURE: 160 MMHG

## 2021-11-16 DIAGNOSIS — Z79.4: ICD-10-CM

## 2021-11-16 DIAGNOSIS — G89.29: ICD-10-CM

## 2021-11-16 DIAGNOSIS — T81.41XA: Primary | ICD-10-CM

## 2021-11-16 DIAGNOSIS — M48.061: ICD-10-CM

## 2021-11-16 DIAGNOSIS — Z83.3: ICD-10-CM

## 2021-11-16 DIAGNOSIS — T46.4X5A: ICD-10-CM

## 2021-11-16 DIAGNOSIS — Z76.5: ICD-10-CM

## 2021-11-16 DIAGNOSIS — E11.22: ICD-10-CM

## 2021-11-16 DIAGNOSIS — N18.30: ICD-10-CM

## 2021-11-16 DIAGNOSIS — Z82.49: ICD-10-CM

## 2021-11-16 DIAGNOSIS — I12.9: ICD-10-CM

## 2021-11-16 DIAGNOSIS — A41.9: ICD-10-CM

## 2021-11-16 DIAGNOSIS — K21.9: ICD-10-CM

## 2021-11-16 DIAGNOSIS — T78.3XXA: ICD-10-CM

## 2021-11-16 DIAGNOSIS — E78.5: ICD-10-CM

## 2021-11-16 DIAGNOSIS — L30.9: ICD-10-CM

## 2021-11-16 LAB
ALBUMIN SERPL-MCNC: 3.6 G/DL (ref 3.4–5)
ALBUMIN/GLOB SERPL: 1.2 {RATIO} (ref 1–1.7)
ALP SERPL-CCNC: 81 U/L (ref 46–116)
ALT SERPL-CCNC: 17 U/L (ref 16–63)
ANION GAP SERPL CALC-SCNC: 10 MMOL/L (ref 6–14)
AST SERPL-CCNC: 11 U/L (ref 15–37)
BASOPHILS # BLD AUTO: 0.1 X10^3/UL (ref 0–0.2)
BASOPHILS NFR BLD: 0 % (ref 0–3)
BILIRUB SERPL-MCNC: 0.7 MG/DL (ref 0.2–1)
BUN SERPL-MCNC: 24 MG/DL (ref 8–26)
BUN/CREAT SERPL: 14 (ref 6–20)
CALCIUM SERPL-MCNC: 8.2 MG/DL (ref 8.5–10.1)
CHLORIDE SERPL-SCNC: 100 MMOL/L (ref 98–107)
CO2 SERPL-SCNC: 25 MMOL/L (ref 21–32)
CREAT SERPL-MCNC: 1.7 MG/DL (ref 0.7–1.3)
EOSINOPHIL NFR BLD: 0.1 X10^3/UL (ref 0–0.7)
EOSINOPHIL NFR BLD: 1 % (ref 0–3)
ERYTHROCYTE [DISTWIDTH] IN BLOOD BY AUTOMATED COUNT: 14.6 % (ref 11.5–14.5)
GFR SERPLBLD BASED ON 1.73 SQ M-ARVRAT: 42.1 ML/MIN
GLUCOSE SERPL-MCNC: 264 MG/DL (ref 70–99)
HCT VFR BLD CALC: 47.6 % (ref 39–53)
HGB BLD-MCNC: 16.1 G/DL (ref 13–17.5)
LYMPHOCYTES # BLD: 1.2 X10^3/UL (ref 1–4.8)
LYMPHOCYTES NFR BLD AUTO: 7 % (ref 24–48)
MCH RBC QN AUTO: 29 PG (ref 25–35)
MCHC RBC AUTO-ENTMCNC: 34 G/DL (ref 31–37)
MCV RBC AUTO: 86 FL (ref 79–100)
MONO #: 1.2 X10^3/UL (ref 0–1.1)
MONOCYTES NFR BLD: 7 % (ref 0–9)
NEUT #: 14.8 X10^3/UL (ref 1.8–7.7)
NEUTROPHILS NFR BLD AUTO: 86 % (ref 31–73)
PLATELET # BLD AUTO: 179 X10^3/UL (ref 140–400)
PLATELET # BLD EST: ADEQUATE 10*3/UL
PLATELET CLUMP: PRESENT
POTASSIUM SERPL-SCNC: 4 MMOL/L (ref 3.5–5.1)
PROT SERPL-MCNC: 6.7 G/DL (ref 6.4–8.2)
RBC # BLD AUTO: 5.52 X10^6/UL (ref 4.3–5.7)
SODIUM SERPL-SCNC: 135 MMOL/L (ref 136–145)
WBC # BLD AUTO: 17.3 X10^3/UL (ref 4–11)

## 2021-11-16 PROCEDURE — 82550 ASSAY OF CK (CPK): CPT

## 2021-11-16 PROCEDURE — 77001 FLUOROGUIDE FOR VEIN DEVICE: CPT

## 2021-11-16 PROCEDURE — 86160 COMPLEMENT ANTIGEN: CPT

## 2021-11-16 PROCEDURE — 85007 BL SMEAR W/DIFF WBC COUNT: CPT

## 2021-11-16 PROCEDURE — 72158 MRI LUMBAR SPINE W/O & W/DYE: CPT

## 2021-11-16 PROCEDURE — 36415 COLL VENOUS BLD VENIPUNCTURE: CPT

## 2021-11-16 PROCEDURE — 80048 BASIC METABOLIC PNL TOTAL CA: CPT

## 2021-11-16 PROCEDURE — 80053 COMPREHEN METABOLIC PANEL: CPT

## 2021-11-16 PROCEDURE — 85025 COMPLETE CBC W/AUTO DIFF WBC: CPT

## 2021-11-16 PROCEDURE — 85651 RBC SED RATE NONAUTOMATED: CPT

## 2021-11-16 PROCEDURE — 87071 CULTURE AEROBIC QUANT OTHER: CPT

## 2021-11-16 PROCEDURE — C1751 CATH, INF, PER/CENT/MIDLINE: HCPCS

## 2021-11-16 PROCEDURE — G0378 HOSPITAL OBSERVATION PER HR: HCPCS

## 2021-11-16 PROCEDURE — 87075 CULTR BACTERIA EXCEPT BLOOD: CPT

## 2021-11-16 PROCEDURE — 36573 INSJ PICC RS&I 5 YR+: CPT

## 2021-11-16 PROCEDURE — 72131 CT LUMBAR SPINE W/O DYE: CPT

## 2021-11-16 PROCEDURE — 82962 GLUCOSE BLOOD TEST: CPT

## 2021-11-16 PROCEDURE — 80307 DRUG TEST PRSMV CHEM ANLYZR: CPT

## 2021-11-16 PROCEDURE — 86140 C-REACTIVE PROTEIN: CPT

## 2021-11-16 PROCEDURE — 87186 SC STD MICRODIL/AGAR DIL: CPT

## 2021-11-16 PROCEDURE — C1892 INTRO/SHEATH,FIXED,PEEL-AWAY: HCPCS

## 2021-11-16 PROCEDURE — 87077 CULTURE AEROBIC IDENTIFY: CPT

## 2021-11-16 RX ADMIN — INSULIN LISPRO SCH UNITS: 100 INJECTION, SOLUTION INTRAVENOUS; SUBCUTANEOUS at 12:00

## 2021-11-16 RX ADMIN — FENTANYL CITRATE PRN MCG: 50 INJECTION INTRAMUSCULAR; INTRAVENOUS at 19:42

## 2021-11-16 RX ADMIN — FENTANYL CITRATE PRN MCG: 50 INJECTION INTRAMUSCULAR; INTRAVENOUS at 17:36

## 2021-11-16 RX ADMIN — Medication SCH GM: at 19:40

## 2021-11-16 RX ADMIN — METOPROLOL TARTRATE SCH MG: 50 TABLET, FILM COATED ORAL at 19:48

## 2021-11-16 RX ADMIN — INSULIN LISPRO SCH UNITS: 100 INJECTION, SOLUTION INTRAVENOUS; SUBCUTANEOUS at 17:00

## 2021-11-16 RX ADMIN — METHOCARBAMOL PRN MG: 750 TABLET ORAL at 19:41

## 2021-11-16 RX ADMIN — FENTANYL CITRATE PRN MCG: 50 INJECTION INTRAMUSCULAR; INTRAVENOUS at 21:44

## 2021-11-16 RX ADMIN — FENTANYL CITRATE PRN MCG: 50 INJECTION INTRAMUSCULAR; INTRAVENOUS at 23:45

## 2021-11-16 RX ADMIN — PROBENECID SCH MG: 500 TABLET, FILM COATED ORAL at 19:48

## 2021-11-16 RX ADMIN — DOCUSATE SODIUM SCH MG: 100 CAPSULE, LIQUID FILLED ORAL at 19:41

## 2021-11-16 RX ADMIN — INSULIN LISPRO SCH UNITS: 100 INJECTION, SOLUTION INTRAVENOUS; SUBCUTANEOUS at 08:00

## 2021-11-16 RX ADMIN — INSULIN LISPRO SCH UNITS: 100 INJECTION, SOLUTION INTRAVENOUS; SUBCUTANEOUS at 00:00

## 2021-11-16 RX ADMIN — CLONIDINE SCH PATCH: 0.2 PATCH TRANSDERMAL at 16:26

## 2021-11-16 NOTE — NUR
Patient arrived around 1545 from home. Dressing over his recent surgical incision present to 
his lower back with a scant amount of shadowing present. Pain at a "16" per patient. He came 
with his walker which he states he has been having to use lately to get around due to the 
pain in his back and the pain that shoots down both of his legs. AMARI Moss 
notified of patients arrival to the unit and orders were placed per Dr Mcgrath 
instructions. Patient is now in bed eating and resting at this time with recent IV pain 
medication given. Home medications started and patient was notified that we do not carry 
some of his medications. He has a prudencio blood sugar meter on his left arm and an insulin 
pump to the left side of his belly. Blood Sugar 237 upon admission per patients prudencio. Will 
continue to monitor.

## 2021-11-16 NOTE — RAD
EXAM: CT lumbar spine without contrast.



HISTORY: Postoperative, infection.



TECHNIQUE: CT of the lumbar spine was performed without intravenous contrast. One or more of the foll
owing individualized dose reduction techniques were utilized for this examination:  

1. Automated exposure control.  

2. Adjustment of the mA and/or kV according to patient size.  

3. Use of iterative reconstruction technique.



COMPARISON: None.



FINDINGS: There is some contrast within the renal collecting systems from a prior administration. Sup
erimposed small renal calculi measure up to 3 mm bilaterally.



There is a mild lumbar levocurvature. No fractures are identified. Intervertebral disc heights are ma
intained. There is mild endplate remodeling from L3 through S1.



At L1-2, there is a minimal posterior disc bulge. There is no stenosis.



At L2-3, there is a minimal posterior disc bulge. There is mild facet and ligamentum flavum hypertrop
hy. There is mild right neural foraminal narrowing.



At L3-4, there are changes of right hemilaminotomy with resection of the right inferior articular pro
cess. There is effacement of the extradural fat posteriorly and laterally on the right, without a kevin
ar space-occupying collection, though sensitivity by CT is limited. There is no clear fluid collectio
n within the operative bed. There appears to be residual moderate right lateral recess stenosis versu
s swelling. There is a small posterior disc bulge.



At L4-5, there is a moderate posterior disc bulge. Facet and ligamentum flavum hypertrophy is mild. C
entral canal stenosis is mild. Neural foraminal stenosis is mild on the left.



At L5-S1, there is a small posterior disc bulge likely with a small superimposed central protrusion. 
Neural foraminal stenosis is mild to moderate on the right.



IMPRESSION:

1. L3-4 right hemilaminotomy with resection of the right inferior articular process. There is soft ti
ssue density effacement of the epidural fat laterally and posteriorly on the right which may reflect 
edema. There appears to be moderate residual right lateral recess stenosis. MRI with and without cont
rast could exclude a fluid collection if there is persistent concern.

2. Central canal stenosis is mild at L4-5. Neural foraminal stenosis is mild to moderate on the right
 at L5-S1 and mild elsewhere as above.



Electronically signed by: LIBBY Ridley MD (11/16/2021 4:40 PM) [facility]

## 2021-11-17 VITALS — SYSTOLIC BLOOD PRESSURE: 189 MMHG | DIASTOLIC BLOOD PRESSURE: 118 MMHG

## 2021-11-17 VITALS — SYSTOLIC BLOOD PRESSURE: 152 MMHG | DIASTOLIC BLOOD PRESSURE: 83 MMHG

## 2021-11-17 VITALS — SYSTOLIC BLOOD PRESSURE: 148 MMHG | DIASTOLIC BLOOD PRESSURE: 87 MMHG

## 2021-11-17 VITALS — DIASTOLIC BLOOD PRESSURE: 109 MMHG | SYSTOLIC BLOOD PRESSURE: 171 MMHG

## 2021-11-17 VITALS — SYSTOLIC BLOOD PRESSURE: 159 MMHG | DIASTOLIC BLOOD PRESSURE: 99 MMHG

## 2021-11-17 VITALS — SYSTOLIC BLOOD PRESSURE: 161 MMHG | DIASTOLIC BLOOD PRESSURE: 99 MMHG

## 2021-11-17 VITALS — SYSTOLIC BLOOD PRESSURE: 180 MMHG | DIASTOLIC BLOOD PRESSURE: 110 MMHG

## 2021-11-17 RX ADMIN — INSULIN LISPRO SCH UNITS: 100 INJECTION, SOLUTION INTRAVENOUS; SUBCUTANEOUS at 07:56

## 2021-11-17 RX ADMIN — Medication SCH GM: at 09:00

## 2021-11-17 RX ADMIN — OXYCODONE HYDROCHLORIDE AND ACETAMINOPHEN PRN TAB: 5; 325 TABLET ORAL at 22:34

## 2021-11-17 RX ADMIN — METHYLPREDNISOLONE SODIUM SUCCINATE SCH MG: 40 INJECTION, POWDER, FOR SOLUTION INTRAMUSCULAR; INTRAVENOUS at 06:42

## 2021-11-17 RX ADMIN — INSULIN LISPRO SCH UNITS: 100 INJECTION, SOLUTION INTRAVENOUS; SUBCUTANEOUS at 11:53

## 2021-11-17 RX ADMIN — METOPROLOL TARTRATE SCH MG: 50 TABLET, FILM COATED ORAL at 21:01

## 2021-11-17 RX ADMIN — FENTANYL CITRATE PRN MCG: 50 INJECTION INTRAMUSCULAR; INTRAVENOUS at 02:11

## 2021-11-17 RX ADMIN — ATORVASTATIN CALCIUM SCH MG: 40 TABLET, FILM COATED ORAL at 07:47

## 2021-11-17 RX ADMIN — PROBENECID SCH MG: 500 TABLET, FILM COATED ORAL at 21:00

## 2021-11-17 RX ADMIN — MORPHINE SULFATE PRN MG: 2 INJECTION, SOLUTION INTRAMUSCULAR; INTRAVENOUS at 20:51

## 2021-11-17 RX ADMIN — DOCUSATE SODIUM SCH MG: 100 CAPSULE, LIQUID FILLED ORAL at 07:46

## 2021-11-17 RX ADMIN — PROBENECID SCH MG: 500 TABLET, FILM COATED ORAL at 10:21

## 2021-11-17 RX ADMIN — ASPIRIN 81 MG SCH MG: 81 TABLET ORAL at 07:46

## 2021-11-17 RX ADMIN — FEBUXOSTAT SCH MG: 40 TABLET ORAL at 10:22

## 2021-11-17 RX ADMIN — METHYLPREDNISOLONE SODIUM SUCCINATE SCH MG: 40 INJECTION, POWDER, FOR SOLUTION INTRAMUSCULAR; INTRAVENOUS at 20:56

## 2021-11-17 RX ADMIN — PIPERACILLIN SODIUM AND TAZOBACTAM SODIUM SCH MLS/HR: 3; .375 INJECTION, POWDER, LYOPHILIZED, FOR SOLUTION INTRAVENOUS at 23:35

## 2021-11-17 RX ADMIN — FAMOTIDINE SCH MG: 10 INJECTION, SOLUTION INTRAVENOUS at 20:56

## 2021-11-17 RX ADMIN — MORPHINE SULFATE PRN MG: 2 INJECTION, SOLUTION INTRAMUSCULAR; INTRAVENOUS at 11:46

## 2021-11-17 RX ADMIN — MORPHINE SULFATE PRN MG: 2 INJECTION, SOLUTION INTRAMUSCULAR; INTRAVENOUS at 05:01

## 2021-11-17 RX ADMIN — PIPERACILLIN SODIUM AND TAZOBACTAM SODIUM SCH MLS/HR: 3; .375 INJECTION, POWDER, LYOPHILIZED, FOR SOLUTION INTRAVENOUS at 10:21

## 2021-11-17 RX ADMIN — PIPERACILLIN SODIUM AND TAZOBACTAM SODIUM SCH MLS/HR: 3; .375 INJECTION, POWDER, LYOPHILIZED, FOR SOLUTION INTRAVENOUS at 17:58

## 2021-11-17 RX ADMIN — EZETIMIBE SCH MG: 10 TABLET ORAL at 07:46

## 2021-11-17 RX ADMIN — OXYCODONE HYDROCHLORIDE AND ACETAMINOPHEN PRN TAB: 5; 325 TABLET ORAL at 15:57

## 2021-11-17 RX ADMIN — METOPROLOL TARTRATE SCH MG: 50 TABLET, FILM COATED ORAL at 07:47

## 2021-11-17 RX ADMIN — MORPHINE SULFATE PRN MG: 2 INJECTION, SOLUTION INTRAMUSCULAR; INTRAVENOUS at 18:03

## 2021-11-17 RX ADMIN — DOCUSATE SODIUM SCH MG: 100 CAPSULE, LIQUID FILLED ORAL at 21:00

## 2021-11-17 RX ADMIN — INSULIN LISPRO SCH UNITS: 100 INJECTION, SOLUTION INTRAVENOUS; SUBCUTANEOUS at 18:10

## 2021-11-17 RX ADMIN — FAMOTIDINE SCH MG: 10 INJECTION, SOLUTION INTRAVENOUS at 06:42

## 2021-11-17 RX ADMIN — Medication SCH CAP: at 21:00

## 2021-11-17 NOTE — HP
DATE OF SERVICE: 11/17/2021

ADMIT DATE: 11/16/2021

HISTORY OF PRESENT ILLNESS:  The patient is a pleasant 56-year-old who underwent

lumbar surgery which included a microdiskectomy at L3-L4 on the right on 

11/03/2021.  He was discharged home the following day, doing well.  He was seen 

in our office last week with incisional pain and erythema of the incision.  

There was no drainage noted at that time.  He was started on Keflex.  He was 

then seen in our office again on 11/16 and had worsened over the weekend.  His 

pain was severe and there was some serous drainage from the incision.  He 

reports that his blood sugars have not been well controlled.  He lives alone and

is having difficulty.  He was admitted for further evaluation and treatment.



PAST MEDICAL HISTORY:  Diabetes and hypertension.



PAST SURGICAL HISTORY:  Craniotomy in 2009 and lumbar surgery in 2017 as well as

11/03/2021 as mentioned above.



FAMILY HISTORY:  Diabetes and hypertension.



SOCIAL HISTORY:  Employed as an .  .  Drinks alcohol 1-2 times 

per month.



ALLERGIES:  No known drug allergies.



CURRENT MEDICATIONS:  See the MRAD.



PHYSICAL EXAMINATION:

GENERAL:  Alert and pleasant, in distress due to lumbar incisional pain.

HEENT:  Head is normocephalic and atraumatic.

SKIN:  Warm and dry.  Lumbar incision with erythema and minimal drainage at this

time, which is serosanguineous.

MUSCULOSKELETAL:  Restricted range of motion of the lumbar spine, 

moderate-to-severe tenderness with palpation of the lower lumbar spine, normal 

range of motion of the lower extremities bilaterally.

EXTREMITIES:  No clubbing, cyanosis or edema.

NEUROLOGIC:  Alert and oriented x 3.  Strength is 5/5 in the lower extremities. 

Sensory was intact to light touch in the lower extremities.  Reflexes were 

symmetric and present in the lower extremities.  He ambulated with a walker.



ASSESSMENT AND PLAN:  The patient is experiencing severe pain and has a 

postoperative infection.  He is being admitted for further evaluation and 

treatment.  We will obtain imaging of the lumbar spine and laboratory studies.  

We will consult Infectious Disease and the hospitalist to assist with medical 

management.







KULWANT/ERMELINDA

DR: Aniyah   DD: 11/17/2021 07:07

DT: 11/17/2021 07:37   TID: [id number]

## 2021-11-17 NOTE — CONS
DATE OF CONSULTATION: 11/17/2021

INTERNAL MEDICINE CONSULTATION



CHIEF COMPLAINT:  Allergic reaction.



HISTORY OF PRESENT ILLNESS: The patient is a pleasant middle-aged male who had a

lumbar surgery 2 weeks ago.  Apparently, the incision site has become possibly 

infected.  He has now been admitted for consultation with Infectious Disease.  

Last night, he developed swollen lips.  We suspect he is having some angioedema.

 I have been consulted for further evaluation and treatment.



PAST MEDICAL HISTORY:  Recent back surgery, hypertension, hyperlipidemia, 

chronic back pain, GERD, diabetes, and insomnia.



ALLERGIES:

1.  DULAGLUTIDE.

2.  FENTANYL.

3.  POSSIBLY LISINOPRIL ?.



FAMILY HISTORY:  Diabetes.



SOCIAL HISTORY:  Does not drink, smoke or take drugs.  He is an .



MEDICATIONS:  Reviewed, please refer to the MRAD.



REVIEW OF SYSTEMS:

GENERAL:  No history of weight change, weakness or fevers.

SKIN:  No bruising, hair changes or rashes.

EYES:  No blurred, double or loss of vision.

NOSE AND THROAT:  No history of nosebleeds, hoarseness or sore throat.

HEENT:  He complains of swollen lips.

HEART:  No history of palpitations, chest pain or shortness of breath on 

exertion.

LUNGS:  Denies cough, hemoptysis, wheezing or shortness of breath.

GASTROINTESTINAL:  Denies changes in appetite, nausea, vomiting, diarrhea or 

constipation.

GENITOURINARY:  No history of frequency, urgency, hesitancy or nocturia.

NEUROLOGIC:  Denies history of numbness, tingling, tremor or weakness.

PSYCHIATRIC:  No history of panic, anxiety or depression.

ENDOCRINE:  No history of heat or cold intolerance, polyuria or polydipsia.

EXTREMITIES:  Denies muscle weakness, joint pain, pain on walking or stiffness. 

MUSCULOSKELETAL:  He complains of back pain.



PHYSICAL EXAMINATION:

VITALS:  Within normal limits and are stable.

GENERAL:  No apparent distress.  Alert and oriented.

HEENT:  Normal cephalic atraumatic, external auditory canals are patent.  He has

angioedema of the lips.

EYES:  Extraocular muscles are intact, pupils are equally round and reactive to 

light and accommodation

MUSCULOSKELETAL:  Well developed, well nourished, good range of motion

ENDOCRINE:  No thyromegaly was palpated

LYMPHATICS:  No cervical chain or axillary nodes were noted

HEMATOPOIETIC:  No bruising

NECK:  Supple, no JVD, no thyromegaly was noted.

LUNGS:  Clear to auscultation in all lung fields without rhonchi or wheezing.

HEART:  RRR, S1, S2 present.  Peripheral pulses intact, no obvious murmurs were 

noted.

ABDOMEN:  Soft, nontender.  Positive bowel sounds no organomegaly, normal bowel 

sounds.

EXTREMITIES:  Without any cyanosis, clubbing, or edema.  Pedal pulses intact, 

Homans sign is negative.

NEUROLOGIC:  Normal speech, normal tone.  A and O x 3, moves all extremities, no

obvious focal deficits.

PSYCHIATRIC:  Normal affect, normal mood.  Stable.

SKIN:  He has a clean, dry intact incision on his lumbar spine.

VASCULAR:  Good capillary refill, neurovascular bundle appears to be intact.

PSYCHIATRIC:  No history of panic, anxiety or depression.



LABORATORY DATA:  White count 17.  Sodium is 135.



ASSESSMENT AND PLAN:  Angioedema, suspect possibly from his ACE inhibitors, but 

also he has received several new meds including fentanyl, so we are not sure.  

Nevertheless, I discussed the case with the nurse.  We have ordered Solu-Medrol,

Pepcid and Benadryl.  Trend labs.  PT/OT, wound care, IV antibiotics per 

Infectious Disease.  We will follow.







BETTIE/YSABEL/REYES

DR: BETTIE/demetris   DD: 11/17/2021 07:45

DT: 11/17/2021 08:24   TID: [id number]

## 2021-11-17 NOTE — PDOC2
CONSULT - DR. SILVA


CHIEF COMPLAINT


Chief Complaint


Postoperative infection and ACE induced angioedema





HPI


HPI


Patient is a very pleasant 56-year-old male who underwent lumbar hemilaminectomy

of the L3-L4 levels on November 3, 2021.  He was actually discharged home the 

following day was doing well.  Unfortunately patient had a surgical site 

complication with infection he was started on Keflex and failed p.o. antibiotics

over the weekend.  There was increasing pain and serous drainage from incision 

and he was brought in for further evaluation and IV antibiotics.  He was given 

fentanyl for pain and patient was on lisinopril chronically.  Unfortunately 

patient had angioedema most likely with his use of ACE inhibitors.  Infectious 

diseases consulted for antibiotic management.  Patient interviewed bedside and 

he was doing well.  He denies any throat swelling or wheezing or trouble 

breathing.  He does not have any periorbital edema or facial swelling.  Only 

upper portion of his lip is swollen and his tongue is fine.  He is speaking in 

full sentences without any use of his accessory muscles.  Denies fevers, 

confusion, syncope, chest pain, shortness of breath, abdominal pain, diarrhea or

hematuria.





PMH


PMH


PAST MEDICAL HISTORY:  Diabetes and hypertension.





PAST SURGICAL HISTORY:  Craniotomy in 2009 and lumbar surgery in 2017 as well as


11/03/2021 as mentioned above.





ROS


ROS


GEN: Upper lip swelling


HEENT:  No blurred vision or sore throat


CV:  Denies chest pain


RESP: No shortness of breath or cough


GI:  As per HPI


:  No dysuria or hematuria


M/S:  No myalgias


NEURO:  No headache


PSYCH:  No depression


Cb





Vital Signs








  Date Time  Temp Pulse Resp B/P (MAP) Pulse Ox O2 Delivery O2 Flow Rate FiO2


 


11/17/21 11:46      Room Air  


 


11/17/21 11:00 97.9 96 18 148/87 (107) 95   





 97.9       








Physical Exam


GEN: Upper lip swelling with minimal erythema.  No tongue swelling


HEENT:  OP clear


CV:  S1S2 without murmurs, rubs, or gallops


RESP:  CTAB without wheezing, rhonchi, or crackles


ABD:  NABS, SNT/ND


EXT:  No edema


NEURO:  AAO x 3


SKIN: There is some erythema around the lumbar incision with erythema and 

minimal drainage which is serosanguineous





LABS


Labs





Laboratory Tests








Test


 11/16/21


18:35 11/17/21


11:15


 


White Blood Count


 17.3 x10^3/uL


(4.0-11.0) 





 


Red Blood Count


 5.52 x10^6/uL


(4.30-5.70) 





 


Hemoglobin


 16.1 g/dL


(13.0-17.5) 





 


Hematocrit


 47.6 %


(39.0-53.0) 





 


Mean Corpuscular Volume 86 fL ()  


 


Mean Corpuscular Hemoglobin 29 pg (25-35)  


 


Mean Corpuscular Hemoglobin


Concent 34 g/dL


(31-37) 





 


Red Cell Distribution Width


 14.6 %


(11.5-14.5) 





 


Platelet Count


 179 x10^3/uL


(140-400) 





 


Neutrophils (%) (Auto) 86 % (31-73)  


 


Lymphocytes (%) (Auto) 7 % (24-48)  


 


Monocytes (%) (Auto) 7 % (0-9)  


 


Eosinophils (%) (Auto) 1 % (0-3)  


 


Basophils (%) (Auto) 0 % (0-3)  


 


Neutrophils # (Auto)


 14.8 x10^3/uL


(1.8-7.7) 





 


Lymphocytes # (Auto)


 1.2 x10^3/uL


(1.0-4.8) 





 


Monocytes # (Auto)


 1.2 x10^3/uL


(0.0-1.1) 





 


Eosinophils # (Auto)


 0.1 x10^3/uL


(0.0-0.7) 





 


Basophils # (Auto)


 0.1 x10^3/uL


(0.0-0.2) 





 


Platelet Estimate


 Adequate


(ADEQUATE) 





 


Platelet Clumps, EDTA Present  


 


Sodium Level


 135 mmol/L


(136-145) 





 


Potassium Level


 4.0 mmol/L


(3.5-5.1) 





 


Chloride Level


 100 mmol/L


() 





 


Carbon Dioxide Level


 25 mmol/L


(21-32) 





 


Anion Gap 10 (6-14)  


 


Blood Urea Nitrogen


 24 mg/dL


(8-26) 





 


Creatinine


 1.7 mg/dL


(0.7-1.3) 





 


Estimated GFR


(Cockcroft-Gault) 42.1 


 





 


BUN/Creatinine Ratio 14 (6-20)  


 


Glucose Level


 264 mg/dL


(70-99) 





 


Calcium Level


 8.2 mg/dL


(8.5-10.1) 





 


Total Bilirubin


 0.7 mg/dL


(0.2-1.0) 





 


Aspartate Amino Transf


(AST/SGOT) 11 U/L (15-37) 


 





 


Alanine Aminotransferase


(ALT/SGPT) 17 U/L (16-63) 


 





 


Alkaline Phosphatase


 81 U/L


() 





 


Total Protein


 6.7 g/dL


(6.4-8.2) 





 


Albumin


 3.6 g/dL


(3.4-5.0) 





 


Albumin/Globulin Ratio 1.2 (1.0-1.7)  


 


Glucose (Fingerstick)


 


 330 mg/dL


(70-99)











IMAGING


Imaging


PROCEDURE: CT LUMBAR SPINE WO CONTRAST





EXAM: CT lumbar spine without contrast.





HISTORY: Postoperative, infection.





TECHNIQUE: CT of the lumbar spine was performed without intravenous contrast. 

One or more of the following individualized dose reduction techniques were 

utilized for this examination:  


1. Automated exposure control.  


2. Adjustment of the mA and/or kV according to patient size.  


3. Use of iterative reconstruction technique.





COMPARISON: None.





FINDINGS: There is some contrast within the renal collecting systems from a 

prior administration. Superimposed small renal calculi measure up to 3 mm 

bilaterally.





There is a mild lumbar levocurvature. No fractures are identified. 

Intervertebral disc heights are maintained. There is mild endplate remodeling 

from L3 through S1.





At L1-2, there is a minimal posterior disc bulge. There is no stenosis.





At L2-3, there is a minimal posterior disc bulge. There is mild facet and 

ligamentum flavum hypertrophy. There is mild right neural foraminal narrowing.





At L3-4, there are changes of right hemilaminotomy with resection of the right 

inferior articular process. There is effacement of the extradural fat 

posteriorly and laterally on the right, without a clear space-occupying 

collection, though sensitivity by CT is limited. There is no clear fluid 

collection within the operative bed. There appears to be residual moderate right

lateral recess stenosis versus swelling. There is a small posterior disc bulge.





At L4-5, there is a moderate posterior disc bulge. Facet and ligamentum flavum 

hypertrophy is mild. Central canal stenosis is mild. Neural foraminal stenosis 

is mild on the left.





At L5-S1, there is a small posterior disc bulge likely with a small superimposed

central protrusion. Neural foraminal stenosis is mild to moderate on the right.





IMPRESSION:


1. L3-4 right hemilaminotomy with resection of the right inferior articular 

process. There is soft tissue density effacement of the epidural fat laterally 

and posteriorly on the right which may reflect edema. There appears to be 

moderate residual right lateral recess stenosis. MRI with and without contrast 

could exclude a fluid collection if there is persistent concern.


2. Central canal stenosis is mild at L4-5. Neural foraminal stenosis is mild to 

moderate on the right at L5-S1 and mild elsewhere as above.





ASSESSMENT & PLAN


Assessment & Plan


ACE inhibitor induced angioedema


Postoperative wound infection








Continue with IV Solu-Medrol twice daily then transition to prednisone taper on 

11/19/2021


Appreciate infectious disease recommendations for antibiotic management


Pending blood and wound cultures


Would avoid ACE inhibitor for now, possible ARB in the future.


Pending C4 levels and C1 esterase levels





Thank you Dr. Ayala for allowing me to see your patient











TAMARA PRATHER MD                  Nov 17, 2021 12:47

## 2021-11-17 NOTE — CONS
DATE OF CONSULTATION: 11/17/2021

REFERRING PHYSICIAN:  Dario Lee MD



REASON FOR CONSULTATION:  Post-surgery spine infection.



HISTORY OF PRESENT ILLNESS:  This is a 56-year-old  gentleman with a 

history of diabetes, hypertension and renal insufficiency, who had a large 

herniated disk.  The patient underwent a hemilaminotomy and microdiscectomy at 

L3-L4 on 11/03/2021.  The patient says he has had some drainage post surgery, 

but in the last two days, pain has gotten significantly more and increased 

drainage.  The patient was put on, I believe, Keflex last Thursday from 

Neurosurgery and he had not been taking in the last two to three days since he 

just could not handle the pain.  The pain was radiating down into the legs, more

so on the right.  The patient denied any incontinence, urinary or bladder.  

Denies any fever.  Some nausea present.  No vomiting.  Denies any chest pain, 

shortness of breath, abdominal pain.  The patient is admitted for further 

management and culture now has been taken.



PAST MEDICAL HISTORY:  Positive for diabetes mellitus, hypertension.  He has had

a craniotomy in the past, another lumbar surgery in 2017 as well as staph 

infection in 2010 required 6 weeks of IV antibiotics, hyperlipidemia.



SOCIAL HISTORY:  Negative for smoking. Social alcohol use. No drug use.



ALLERGIES:  No known drug allergies.



CURRENT MEDICATIONS:  Reviewed.



REVIEW OF SYSTEMS:  As in HPI.  All other systems reviewed are negative.



PHYSICAL EXAMINATION:

GENERAL:  Alert, oriented gentleman, not in distress.

VITAL SIGNS:  Stable.  Temperature 97.9, pulse 90, respirations 18, blood 

pressure 152/83.

HEENT:  Both pupils are round and reacting.  No conjunctival lesion. No lesion 

in the mouth.

NECK:  Supple. No JVP. No lymphadenopathy.

LUNGS:  Clear.

HEART:  S1, S2, regular.

ABDOMEN:  Soft, nontender. No organomegaly.

EXTREMITIES:  No edema, cyanosis.

SKIN:  The patient does have some hives from fentanyl and lip swelling.

SPINE: There is incision with drainage present.

NEUROLOGIC:  The patient is alert, awake and appropriate. No focal neurologic 

deficit.



LABORATORY DATA:  White count is 17.3.  BUN and creatinine is 24 and 1.7.  CT of

the spine was done yesterday, it was unremarkable for any abscess.



IMPRESSION:

1.  Postsurgical spine infection.

2.  Status post right-sided hemilaminotomy and microdiscectomy L3-L4 done on 

11/03/2021.

3.  Diabetes mellitus.

4.  Hypertension.

5.  Leukocytosis.

6.  Renal insufficiency.



RECOMMENDATIONS:  Would initiate daptomycin and Zosyn.  Supportive care.  We 

will follow the cultures and adjust and would aggressively treat with long-term 

IV antibiotics.



Thank you very much, Dr. Lee for giving me opportunity to participate in 

this patient's care.







LOVE/GIR/AMI

DR: LOVE/demetris   DD: 11/17/2021 08:34

DT: 11/17/2021 08:58   TID: [id number]

## 2021-11-17 NOTE — PDOC
PROGRESS NOTES


Date of Service


DATE: 11/17/21 


TIME: 16:14





Subjective


Subjective


ambulating in the room with walker to bathroom


continues to have back pain


swelling of lips overnight, lisinopril stopped, swelling improved with 

solumedrol and Benadryl





Objective


Objective





Vital Signs








  Date Time  Temp Pulse Resp B/P (MAP) Pulse Ox O2 Delivery O2 Flow Rate FiO2


 


11/17/21 15:57      Room Air  


 


11/17/21 15:00 97.8 98 18 159/99 (119) 95   





 97.8       














Intake and Output 


 


 11/17/21





 07:00


 


Intake Total 520 ml


 


Output Total 400 ml


 


Balance 120 ml


 


 


 


Intake Oral 520 ml


 


Output Urine Total 400 ml











Physical Exam


General:  Alert, Oriented X3, Cooperative


MUSCULOSKELETAL:  Other (HANNA)


Neuro:  Other


Skin:  Other (dressing intact, some drainage noted)





Plan


Plan of Care


continue antibiotics per ID


Cultures pending 


wound care/ dressing changes


encouraged activity as tolerated


SCDs when in bed





Comment


Review of Relevant


I have reviewed the following items susan (where applicable) has been applied.


Labs





Laboratory Tests








Test


 11/16/21


18:35 11/17/21


11:15


 


White Blood Count


 17.3 x10^3/uL


(4.0-11.0) 





 


Red Blood Count


 5.52 x10^6/uL


(4.30-5.70) 





 


Hemoglobin


 16.1 g/dL


(13.0-17.5) 





 


Hematocrit


 47.6 %


(39.0-53.0) 





 


Mean Corpuscular Volume 86 fL ()  


 


Mean Corpuscular Hemoglobin 29 pg (25-35)  


 


Mean Corpuscular Hemoglobin


Concent 34 g/dL


(31-37) 





 


Red Cell Distribution Width


 14.6 %


(11.5-14.5) 





 


Platelet Count


 179 x10^3/uL


(140-400) 





 


Neutrophils (%) (Auto) 86 % (31-73)  


 


Lymphocytes (%) (Auto) 7 % (24-48)  


 


Monocytes (%) (Auto) 7 % (0-9)  


 


Eosinophils (%) (Auto) 1 % (0-3)  


 


Basophils (%) (Auto) 0 % (0-3)  


 


Neutrophils # (Auto)


 14.8 x10^3/uL


(1.8-7.7) 





 


Lymphocytes # (Auto)


 1.2 x10^3/uL


(1.0-4.8) 





 


Monocytes # (Auto)


 1.2 x10^3/uL


(0.0-1.1) 





 


Eosinophils # (Auto)


 0.1 x10^3/uL


(0.0-0.7) 





 


Basophils # (Auto)


 0.1 x10^3/uL


(0.0-0.2) 





 


Platelet Estimate


 Adequate


(ADEQUATE) 





 


Platelet Clumps, EDTA Present  


 


Sodium Level


 135 mmol/L


(136-145) 





 


Potassium Level


 4.0 mmol/L


(3.5-5.1) 





 


Chloride Level


 100 mmol/L


() 





 


Carbon Dioxide Level


 25 mmol/L


(21-32) 





 


Anion Gap 10 (6-14)  


 


Blood Urea Nitrogen


 24 mg/dL


(8-26) 





 


Creatinine


 1.7 mg/dL


(0.7-1.3) 





 


Estimated GFR


(Cockcroft-Gault) 42.1 


 





 


BUN/Creatinine Ratio 14 (6-20)  


 


Glucose Level


 264 mg/dL


(70-99) 





 


Calcium Level


 8.2 mg/dL


(8.5-10.1) 





 


Total Bilirubin


 0.7 mg/dL


(0.2-1.0) 





 


Aspartate Amino Transf


(AST/SGOT) 11 U/L (15-37) 


 





 


Alanine Aminotransferase


(ALT/SGPT) 17 U/L (16-63) 


 





 


Alkaline Phosphatase


 81 U/L


() 





 


Total Protein


 6.7 g/dL


(6.4-8.2) 





 


Albumin


 3.6 g/dL


(3.4-5.0) 





 


Albumin/Globulin Ratio 1.2 (1.0-1.7)  


 


Glucose (Fingerstick)


 


 330 mg/dL


(70-99)








Laboratory Tests








Test


 11/16/21


18:35 11/17/21


11:15


 


White Blood Count


 17.3 x10^3/uL


(4.0-11.0) 





 


Red Blood Count


 5.52 x10^6/uL


(4.30-5.70) 





 


Hemoglobin


 16.1 g/dL


(13.0-17.5) 





 


Hematocrit


 47.6 %


(39.0-53.0) 





 


Mean Corpuscular Volume 86 fL ()  


 


Mean Corpuscular Hemoglobin 29 pg (25-35)  


 


Mean Corpuscular Hemoglobin


Concent 34 g/dL


(31-37) 





 


Red Cell Distribution Width


 14.6 %


(11.5-14.5) 





 


Platelet Count


 179 x10^3/uL


(140-400) 





 


Neutrophils (%) (Auto) 86 % (31-73)  


 


Lymphocytes (%) (Auto) 7 % (24-48)  


 


Monocytes (%) (Auto) 7 % (0-9)  


 


Eosinophils (%) (Auto) 1 % (0-3)  


 


Basophils (%) (Auto) 0 % (0-3)  


 


Neutrophils # (Auto)


 14.8 x10^3/uL


(1.8-7.7) 





 


Lymphocytes # (Auto)


 1.2 x10^3/uL


(1.0-4.8) 





 


Monocytes # (Auto)


 1.2 x10^3/uL


(0.0-1.1) 





 


Eosinophils # (Auto)


 0.1 x10^3/uL


(0.0-0.7) 





 


Basophils # (Auto)


 0.1 x10^3/uL


(0.0-0.2) 





 


Platelet Estimate


 Adequate


(ADEQUATE) 





 


Platelet Clumps, EDTA Present  


 


Sodium Level


 135 mmol/L


(136-145) 





 


Potassium Level


 4.0 mmol/L


(3.5-5.1) 





 


Chloride Level


 100 mmol/L


() 





 


Carbon Dioxide Level


 25 mmol/L


(21-32) 





 


Anion Gap 10 (6-14)  


 


Blood Urea Nitrogen


 24 mg/dL


(8-26) 





 


Creatinine


 1.7 mg/dL


(0.7-1.3) 





 


Estimated GFR


(Cockcroft-Gault) 42.1 


 





 


BUN/Creatinine Ratio 14 (6-20)  


 


Glucose Level


 264 mg/dL


(70-99) 





 


Calcium Level


 8.2 mg/dL


(8.5-10.1) 





 


Total Bilirubin


 0.7 mg/dL


(0.2-1.0) 





 


Aspartate Amino Transf


(AST/SGOT) 11 U/L (15-37) 


 





 


Alanine Aminotransferase


(ALT/SGPT) 17 U/L (16-63) 


 





 


Alkaline Phosphatase


 81 U/L


() 





 


Total Protein


 6.7 g/dL


(6.4-8.2) 





 


Albumin


 3.6 g/dL


(3.4-5.0) 





 


Albumin/Globulin Ratio 1.2 (1.0-1.7)  


 


Glucose (Fingerstick)


 


 330 mg/dL


(70-99)








Microbiology


11/16/21 Gram Stain - Final, Resulted


           


11/16/21 Aerobic and Anaerobic Culture, Resulted


           Pending


Medications





Current Medications


Fentanyl Citrate (Fentanyl 2ml Vial) 50 mcg PRN Q2HR  PRN IVP PAIN Last admin

istered on 11/17/21at 02:11;  Start 11/16/21 at 16:00;  Stop 11/17/21 at 03:01; 

Status DC


Aspirin (Aspirin Chewable) 81 mg DAILY PO  Last administered on 11/17/21at 

07:46;  Start 11/17/21 at 09:00


Clonidine HCl (Catapres Tts-2) 1 patch WEEKLY TD  Last administered on 

11/16/21at 16:26;  Start 11/16/21 at 17:00


Docusate Sodium (Colace) 100 mg BID PO  Last administered on 11/17/21at 07:46;  

Start 11/16/21 at 21:00


EZETIMIBE (Zetia) 10 mg DAILY PO  Last administered on 11/17/21at 07:46;  Start 

11/17/21 at 09:00


Hydralazine HCl (Apresoline) 50 mg DAILY PO  Last administered on 11/17/21at 

07:48;  Start 11/17/21 at 09:00


Hydrochlorothiazide (Microzide) 12.5 mg DAILY PO  Last administered on 

11/17/21at 07:47;  Start 11/17/21 at 09:00


Acetaminophen/ Hydrocodone Bitart (Lortab 7.5/325) 2 tab PRN Q4HRS  PRN PO PAIN 

Last administered on 11/17/21at 10:25;  Start 11/16/21 at 16:00;  Stop 11/17/21 

at 13:34;  Status DC


Lisinopril (Prinivil) 20 mg DAILY PO ;  Start 11/17/21 at 09:00;  Stop 11/17/21 

at 06:31;  Status DC


Methocarbamol (Robaxin) 750 mg TID PRN  PRN PO MUSCLE SPASMS Last administered 

on 11/16/21at 19:41;  Start 11/16/21 at 16:00


Atorvastatin Calcium (Lipitor) 80 mg DAILY PO  Last administered on 11/17/21at 

07:47;  Start 11/17/21 at 09:00


Non-Formulary Medication (Empagliflozin (Jardiance)) 25 mg DAILY PO ;  Start 

11/17/21 at 09:00;  Status UNV


Febuxostat (Uloric) 80 mg DAILY PO  Last administered on 11/17/21at 10:22;  

Start 11/17/21 at 09:00


Non-Formulary Medication (Icosapent Ethyl (Vascepa)) 1 gm BID PO ;  Start 

11/16/21 at 21:00;  Status UNV


Metoprolol Tartrate (Lopressor) 50 mg BID PO  Last administered on 11/17/21at 

07:47;  Start 11/16/21 at 21:00


Probenecid (Benemid) 500 mg BID PO  Last administered on 11/17/21at 10:21;  

Start 11/16/21 at 21:00


Non-Formulary Medication (Semaglutide (Ozempic)) 1 mg WEEKLY SQ ;  Start 

11/23/21 at 09:00;  Status UNV


Zolpidem Tartrate (Ambien) 5 mg PRN QHS  PRN PO INSOMNIA, MAY REPEAT X1;  Start 

11/16/21 at 16:15


Insulin Human Lispro (HumaLOG) 0-9 UNITS TIDWMEALS SQ  Last administered on 

11/17/21at 11:53;  Start 11/16/21 at 00:00


Dextrose (Dextrose 50%-Water Syringe) 12.5 gm PRN Q15MIN  PRN IV SEE COMMENTS;  

Start 11/16/21 at 20:45


Diphenhydramine HCl (Benadryl) 50 mg PRN Q6HRS  PRN IVP ITCHING Last 

administered on 11/17/21at 04:19;  Start 11/17/21 at 03:00


Diphenhydramine HCl (Benadryl) 100 mg PRN Q6HRS  PRN IVP ITCHING;  Start 

11/17/21 at 03:00


Morphine Sulfate (Morphine Sulfate) 2 mg PRN Q2HR  PRN IVP PAIN Last 

administered on 11/17/21at 11:46;  Start 11/17/21 at 03:00


Famotidine (Pepcid Vial) 20 mg BID IVP  Last administered on 11/17/21at 06:42;  

Start 11/17/21 at 07:00


Methylprednisolone Sodium Succinate (SOLU-Medrol 40MG VIAL) 40 mg Q12HR IV  Last

administered on 11/17/21at 06:42;  Start 11/17/21 at 07:00


Daptomycin 640 mg/ Sodium Chloride 50 ml @  100 mls/hr Q24H IV  Last 

administered on 11/17/21at 11:01;  Start 11/17/21 at 10:00;  Stop 11/17/21 at 

14:59;  Status DC


Piperacillin Sod/ Tazobactam Sod 3.375 gm/Sodium Chloride 50 ml @  100 mls/hr 

Q6HRS IV  Last administered on 11/17/21at 10:21;  Start 11/17/21 at 10:00


Lactobacillus Rhamnosus (Culturelle) 1 cap BID PO ;  Start 11/17/21 at 21:00


Oxycodone/ Acetaminophen (Percocet 5/325) 1 tab PRN Q4HRS  PRN PO MODERATE PAIN;

 Start 11/17/21 at 13:45


Oxycodone/ Acetaminophen (Percocet 5/325) 2 tab PRN Q4HRS  PRN PO SEVERE PAIN 

Last administered on 11/17/21at 15:57;  Start 11/17/21 at 13:45


Daptomycin 500 mg/ Sodium Chloride 50 ml @  100 mls/hr Q24H IV ;  Start 11/18/21

at 09:00





Active Scripts


Active


Colace (Docusate Sodium) 100 Mg Capsule 100 Mg PO BID 60 Days


Methocarbamol 750 Mg Tablet 750 Mg PO TID PRN PRN


Reported


[V-Go 40 Day Kit ]   Units SQ Q1HR


     5 CLICKS @ MEALS


     2 CLICKS @ SNACKS


Freestyle Lite Test Strip (Blood Sugar Diagnostic) 1 Each Strip 1 Strip MC DAILY

30 Days


Hydrocodone-Apap 7.5-325  ** (Hydrocodone Bit/Acetaminophen) 1 Tab Tablet 1-2 

Tab PO PRN Q4HRS PRN


Vascepa (Icosapent Ethyl) 1 Gm Capsule 1 Gm PO BID


Zolpidem Tartrate Er (Zolpidem Tartrate) 12.5 Mg Tab.mphase 12.5 Mg PO PRN QHS 

PRN


Probenecid 500 Mg Tablet 500 Mg PO BID


Hydrochlorothiazide Capsule  ** (Hydrochlorothiazide) 12.5 Mg Capsule 12.5 Mg PO

DAILY


Hydralazine Hcl 50 Mg Tablet 1 Tab PO DAILY


Uloric (Febuxostat) 80 Mg Tablet 1 Tab PO DAILY 30 Days


Clonidine Tts-2  ** (Clonidine) 1 Each Patch.tdwk 1 Patch TD WEEKLY


Bystolic (Nebivolol Hcl) 20 Mg Tablet 20 Mg PO DAILY


Aspirin 81 Mg Tab.chew 1 Tab PO DAILY


Zetia (Ezetimibe) 10 Mg Tablet 10 Mg PO DAILY


Zestril (Lisinopril) 20 Mg Tablet 1 Tab PO DAILY 30 Days


Ozempic (Semaglutide) 1 Mg/0.75 Ml Pen.injctr 1 Mg SQ WEEKLY


     SATURDAY


Lipitor (Atorvastatin Calcium) 80 Mg Tablet 1 Tab PO DAILY


Jardiance (Empagliflozin) 25 Mg Tablet 25 Mg PO DAILY


Vitals/I & O





Vital Sign - Last 24 Hours








 11/16/21 11/16/21 11/16/21 11/16/21





 16:22 16:52 17:36 17:37


 


Pulse Ox  98 98 


 


O2 Delivery Room Air Room Air  Room Air





 11/16/21 11/16/21 11/16/21 11/16/21





 18:06 19:00 19:42 19:48


 


Temp  97.7  





  97.7  


 


Pulse  102  101


 


Resp  20  


 


B/P (MAP)  131/76 (94)  131/76


 


Pulse Ox 98 96 98 


 


O2 Delivery Room Air Room Air Room Air 


 


    





    





 11/16/21 11/16/21 11/17/21 11/17/21





 20:00 23:00 02:11 02:15


 


Temp  98.1  





  98.1  


 


Pulse  93  


 


Resp  16 20 


 


B/P (MAP)  141/100 (114)  


 


Pulse Ox  96  96


 


O2 Delivery Room Air Room Air Room Air Room Air


 


    





    





 11/17/21 11/17/21 11/17/21 11/17/21





 02:38 07:00 07:40 07:47


 


Temp 97.9 97.9  





 97.9 97.9  


 


Pulse 92 90  90


 


Resp 20 18  


 


B/P (MAP) 171/109 (129) 152/83 (106)  152/83


 


Pulse Ox 98 96  


 


O2 Delivery Room Air Room Air Room Air 


 


    





    





 11/17/21 11/17/21 11/17/21 11/17/21





 07:48 10:25 11:00 11:06


 


Temp   97.9 





   97.9 


 


Pulse 90  96 


 


Resp   18 


 


B/P (MAP) 152/83  148/87 (107) 


 


Pulse Ox   95 


 


O2 Delivery  Room Air Room Air Room Air


 


    





    





 11/17/21 11/17/21 11/17/21 11/17/21





 11:46 12:20 15:00 15:57


 


Temp   97.8 





   97.8 


 


Pulse   98 


 


Resp   18 


 


B/P (MAP)   159/99 (119) 


 


Pulse Ox   95 


 


O2 Delivery Room Air Room Air Room Air Room Air














Intake and Output   


 


 11/16/21 11/16/21 11/17/21





 15:00 23:00 07:00


 


Intake Total  320 ml 200 ml


 


Output Total  400 ml 


 


Balance  -80 ml 200 ml











Justifications for Admission


Other Justification














ALIZA CHRIS APRN          Nov 17, 2021 16:20

## 2021-11-17 NOTE — PDOC
Infectious Disease Note


Vital Sign


Vital Signs





Vital Signs








  Date Time  Temp Pulse Resp B/P (MAP) Pulse Ox O2 Delivery O2 Flow Rate FiO2


 


11/17/21 07:48  90  152/83    


 


11/17/21 07:00 97.9  18  96 Room Air  





 97.9       











Labs


Lab





Laboratory Tests








Test


 11/16/21


18:35


 


White Blood Count


 17.3 x10^3/uL


(4.0-11.0)


 


Red Blood Count


 5.52 x10^6/uL


(4.30-5.70)


 


Hemoglobin


 16.1 g/dL


(13.0-17.5)


 


Hematocrit


 47.6 %


(39.0-53.0)


 


Mean Corpuscular Volume 86 fL () 


 


Mean Corpuscular Hemoglobin 29 pg (25-35) 


 


Mean Corpuscular Hemoglobin


Concent 34 g/dL


(31-37)


 


Red Cell Distribution Width


 14.6 %


(11.5-14.5)


 


Platelet Count


 179 x10^3/uL


(140-400)


 


Neutrophils (%) (Auto) 86 % (31-73) 


 


Lymphocytes (%) (Auto) 7 % (24-48) 


 


Monocytes (%) (Auto) 7 % (0-9) 


 


Eosinophils (%) (Auto) 1 % (0-3) 


 


Basophils (%) (Auto) 0 % (0-3) 


 


Neutrophils # (Auto)


 14.8 x10^3/uL


(1.8-7.7)


 


Lymphocytes # (Auto)


 1.2 x10^3/uL


(1.0-4.8)


 


Monocytes # (Auto)


 1.2 x10^3/uL


(0.0-1.1)


 


Eosinophils # (Auto)


 0.1 x10^3/uL


(0.0-0.7)


 


Basophils # (Auto)


 0.1 x10^3/uL


(0.0-0.2)


 


Platelet Estimate


 Adequate


(ADEQUATE)


 


Platelet Clumps, EDTA Present 


 


Sodium Level


 135 mmol/L


(136-145)


 


Potassium Level


 4.0 mmol/L


(3.5-5.1)


 


Chloride Level


 100 mmol/L


()


 


Carbon Dioxide Level


 25 mmol/L


(21-32)


 


Anion Gap 10 (6-14) 


 


Blood Urea Nitrogen


 24 mg/dL


(8-26)


 


Creatinine


 1.7 mg/dL


(0.7-1.3)


 


Estimated GFR


(Cockcroft-Gault) 42.1 





 


BUN/Creatinine Ratio 14 (6-20) 


 


Glucose Level


 264 mg/dL


(70-99)


 


Calcium Level


 8.2 mg/dL


(8.5-10.1)


 


Total Bilirubin


 0.7 mg/dL


(0.2-1.0)


 


Aspartate Amino Transf


(AST/SGOT) 11 U/L (15-37) 





 


Alanine Aminotransferase


(ALT/SGPT) 17 U/L (16-63) 





 


Alkaline Phosphatase


 81 U/L


()


 


Total Protein


 6.7 g/dL


(6.4-8.2)


 


Albumin


 3.6 g/dL


(3.4-5.0)


 


Albumin/Globulin Ratio 1.2 (1.0-1.7) 











Objective


Assessment


pt seen, consult dictated





Plan


Plan of Care


/











LIVIER PRITCHETT MD               Nov 17, 2021 08:29

## 2021-11-18 VITALS — DIASTOLIC BLOOD PRESSURE: 95 MMHG | SYSTOLIC BLOOD PRESSURE: 157 MMHG

## 2021-11-18 VITALS — SYSTOLIC BLOOD PRESSURE: 173 MMHG | DIASTOLIC BLOOD PRESSURE: 103 MMHG

## 2021-11-18 VITALS — SYSTOLIC BLOOD PRESSURE: 189 MMHG | DIASTOLIC BLOOD PRESSURE: 125 MMHG

## 2021-11-18 VITALS — SYSTOLIC BLOOD PRESSURE: 164 MMHG | DIASTOLIC BLOOD PRESSURE: 101 MMHG

## 2021-11-18 VITALS — DIASTOLIC BLOOD PRESSURE: 99 MMHG | SYSTOLIC BLOOD PRESSURE: 164 MMHG

## 2021-11-18 VITALS — DIASTOLIC BLOOD PRESSURE: 94 MMHG | SYSTOLIC BLOOD PRESSURE: 167 MMHG

## 2021-11-18 LAB
% BANDS: 2 % (ref 0–9)
% LYMPHS: 7 % (ref 24–48)
% MONOS: 2 % (ref 0–10)
% SEGS: 89 % (ref 35–66)
AMPHETAMINE/METHAMPHETAMINE: (no result)
ANION GAP SERPL CALC-SCNC: 13 MMOL/L (ref 6–14)
BARBITURATES UR-MCNC: (no result) UG/ML
BASOPHILS # BLD AUTO: 0 X10^3/UL (ref 0–0.2)
BASOPHILS NFR BLD: 0 % (ref 0–3)
BENZODIAZ UR-MCNC: (no result) UG/L
BUN SERPL-MCNC: 32 MG/DL (ref 8–26)
CALCIUM SERPL-MCNC: 8.9 MG/DL (ref 8.5–10.1)
CANNABINOIDS UR-MCNC: (no result) UG/L
CHLORIDE SERPL-SCNC: 97 MMOL/L (ref 98–107)
CO2 SERPL-SCNC: 24 MMOL/L (ref 21–32)
COCAINE UR-MCNC: (no result) NG/ML
CREAT SERPL-MCNC: 1.6 MG/DL (ref 0.7–1.3)
EOSINOPHIL NFR BLD: 0 % (ref 0–3)
EOSINOPHIL NFR BLD: 0 X10^3/UL (ref 0–0.7)
ERYTHROCYTE [DISTWIDTH] IN BLOOD BY AUTOMATED COUNT: 14.6 % (ref 11.5–14.5)
GFR SERPLBLD BASED ON 1.73 SQ M-ARVRAT: 45.1 ML/MIN
GLUCOSE SERPL-MCNC: 375 MG/DL (ref 70–99)
HCT VFR BLD CALC: 45.5 % (ref 39–53)
HGB BLD-MCNC: 15.3 G/DL (ref 13–17.5)
LYMPHOCYTES # BLD: 0.8 X10^3/UL (ref 1–4.8)
LYMPHOCYTES NFR BLD AUTO: 5 % (ref 24–48)
MCH RBC QN AUTO: 29 PG (ref 25–35)
MCHC RBC AUTO-ENTMCNC: 34 G/DL (ref 31–37)
MCV RBC AUTO: 87 FL (ref 79–100)
METHADONE SERPL-MCNC: (no result) NG/ML
MONO #: 0.4 X10^3/UL (ref 0–1.1)
MONOCYTES NFR BLD: 3 % (ref 0–9)
NEUT #: 13.3 X10^3/UL (ref 1.8–7.7)
NEUTROPHILS NFR BLD AUTO: 92 % (ref 31–73)
OPIATES UR-MCNC: (no result) NG/ML
PCP SERPL-MCNC: (no result) MG/DL
PLATELET # BLD AUTO: 215 X10^3/UL (ref 140–400)
PLATELET # BLD EST: ADEQUATE 10*3/UL
POTASSIUM SERPL-SCNC: 4.2 MMOL/L (ref 3.5–5.1)
RBC # BLD AUTO: 5.23 X10^6/UL (ref 4.3–5.7)
SODIUM SERPL-SCNC: 134 MMOL/L (ref 136–145)
WBC # BLD AUTO: 14.5 X10^3/UL (ref 4–11)

## 2021-11-18 PROCEDURE — B548ZZA ULTRASONOGRAPHY OF SUPERIOR VENA CAVA, GUIDANCE: ICD-10-PCS | Performed by: RADIOLOGY

## 2021-11-18 PROCEDURE — B5181ZA FLUOROSCOPY OF SUPERIOR VENA CAVA USING LOW OSMOLAR CONTRAST, GUIDANCE: ICD-10-PCS | Performed by: RADIOLOGY

## 2021-11-18 PROCEDURE — 02HV33Z INSERTION OF INFUSION DEVICE INTO SUPERIOR VENA CAVA, PERCUTANEOUS APPROACH: ICD-10-PCS | Performed by: RADIOLOGY

## 2021-11-18 RX ADMIN — METOPROLOL TARTRATE SCH MG: 50 TABLET, FILM COATED ORAL at 20:42

## 2021-11-18 RX ADMIN — MORPHINE SULFATE PRN MG: 2 INJECTION, SOLUTION INTRAMUSCULAR; INTRAVENOUS at 22:02

## 2021-11-18 RX ADMIN — Medication SCH CAP: at 08:41

## 2021-11-18 RX ADMIN — OXYCODONE HYDROCHLORIDE AND ACETAMINOPHEN PRN TAB: 5; 325 TABLET ORAL at 18:48

## 2021-11-18 RX ADMIN — MORPHINE SULFATE PRN MG: 2 INJECTION, SOLUTION INTRAMUSCULAR; INTRAVENOUS at 20:02

## 2021-11-18 RX ADMIN — OXYCODONE HYDROCHLORIDE AND ACETAMINOPHEN PRN TAB: 5; 325 TABLET ORAL at 23:08

## 2021-11-18 RX ADMIN — DOCUSATE SODIUM SCH MG: 100 CAPSULE, LIQUID FILLED ORAL at 08:46

## 2021-11-18 RX ADMIN — OXYCODONE HYDROCHLORIDE AND ACETAMINOPHEN PRN TAB: 5; 325 TABLET ORAL at 14:56

## 2021-11-18 RX ADMIN — MORPHINE SULFATE PRN MG: 2 INJECTION, SOLUTION INTRAMUSCULAR; INTRAVENOUS at 13:49

## 2021-11-18 RX ADMIN — ZIPRASIDONE HCL PRN MG: 20 CAPSULE ORAL at 00:18

## 2021-11-18 RX ADMIN — EZETIMIBE SCH MG: 10 TABLET ORAL at 08:45

## 2021-11-18 RX ADMIN — Medication SCH CAP: at 20:42

## 2021-11-18 RX ADMIN — FEBUXOSTAT SCH MG: 40 TABLET ORAL at 08:41

## 2021-11-18 RX ADMIN — METOPROLOL TARTRATE SCH MG: 50 TABLET, FILM COATED ORAL at 08:40

## 2021-11-18 RX ADMIN — METHYLPREDNISOLONE SODIUM SUCCINATE SCH MG: 40 INJECTION, POWDER, FOR SOLUTION INTRAMUSCULAR; INTRAVENOUS at 20:42

## 2021-11-18 RX ADMIN — ASPIRIN 81 MG SCH MG: 81 TABLET ORAL at 08:39

## 2021-11-18 RX ADMIN — ZIPRASIDONE MESYLATE PRN MG: 20 INJECTION, POWDER, LYOPHILIZED, FOR SOLUTION INTRAMUSCULAR at 06:05

## 2021-11-18 RX ADMIN — DOCUSATE SODIUM SCH MG: 100 CAPSULE, LIQUID FILLED ORAL at 20:43

## 2021-11-18 RX ADMIN — PROBENECID SCH MG: 500 TABLET, FILM COATED ORAL at 08:39

## 2021-11-18 RX ADMIN — PIPERACILLIN SODIUM AND TAZOBACTAM SODIUM SCH MLS/HR: 3; .375 INJECTION, POWDER, LYOPHILIZED, FOR SOLUTION INTRAVENOUS at 17:06

## 2021-11-18 RX ADMIN — INSULIN LISPRO SCH UNITS: 100 INJECTION, SOLUTION INTRAVENOUS; SUBCUTANEOUS at 14:09

## 2021-11-18 RX ADMIN — METHYLPREDNISOLONE SODIUM SUCCINATE SCH MG: 40 INJECTION, POWDER, FOR SOLUTION INTRAMUSCULAR; INTRAVENOUS at 15:00

## 2021-11-18 RX ADMIN — PIPERACILLIN SODIUM AND TAZOBACTAM SODIUM SCH MLS/HR: 3; .375 INJECTION, POWDER, LYOPHILIZED, FOR SOLUTION INTRAVENOUS at 06:13

## 2021-11-18 RX ADMIN — DAPTOMYCIN SCH MLS/HR: 500 INJECTION, POWDER, LYOPHILIZED, FOR SOLUTION INTRAVENOUS at 14:01

## 2021-11-18 RX ADMIN — PROBENECID SCH MG: 500 TABLET, FILM COATED ORAL at 20:41

## 2021-11-18 RX ADMIN — FAMOTIDINE SCH MG: 10 INJECTION, SOLUTION INTRAVENOUS at 20:42

## 2021-11-18 RX ADMIN — INSULIN LISPRO SCH UNITS: 100 INJECTION, SOLUTION INTRAVENOUS; SUBCUTANEOUS at 08:48

## 2021-11-18 RX ADMIN — ATORVASTATIN CALCIUM SCH MG: 40 TABLET, FILM COATED ORAL at 08:39

## 2021-11-18 RX ADMIN — INSULIN LISPRO SCH UNITS: 100 INJECTION, SOLUTION INTRAVENOUS; SUBCUTANEOUS at 17:16

## 2021-11-18 RX ADMIN — ZIPRASIDONE MESYLATE PRN MG: 20 INJECTION, POWDER, LYOPHILIZED, FOR SOLUTION INTRAMUSCULAR at 01:51

## 2021-11-18 RX ADMIN — PIPERACILLIN SODIUM AND TAZOBACTAM SODIUM SCH MLS/HR: 3; .375 INJECTION, POWDER, LYOPHILIZED, FOR SOLUTION INTRAVENOUS at 14:55

## 2021-11-18 RX ADMIN — FAMOTIDINE SCH MG: 10 INJECTION, SOLUTION INTRAVENOUS at 09:00

## 2021-11-18 RX ADMIN — MORPHINE SULFATE PRN MG: 2 INJECTION, SOLUTION INTRAMUSCULAR; INTRAVENOUS at 17:07

## 2021-11-18 NOTE — RAD
Date: 11/18/2021



Exam: Fluoroscopic and ultrasound guided peripheral central venous catheter placement.

 

Indication:  



Consent: The procedure was explained in its entirety to the patient or the patients designated repres
entative by a member of the treatment team, including a discussion of the risks, benefits and commonl
y accepted alternatives to the procedure, as well as the expected consequences of no therapy whatsoev
er.   Discussion of the risks included, but was not limited to, those that are most frequent and thos
e that are rare but possibly severe or life-threatening, as well as the possibility of unforeseen com
plications.  



Discussion: 



A timeout procedure was performed.   The patient was prepped and draped using maximum sterile techniq
ue, including the use of: Current guideline approved cutaneous antisepsis, a large sterile sheet to e
stablish a sterile field. Additionally the  wore a hat, mask, sterile gloves, a sterile gown 
during the procedure as well as practiced acceptable hand hygiene prior to placing the line. 1% lidoc
sveta was administered for local anesthesia. 



Ultrasound evaluation demonstrates a patent  right cephalic vein. Reference images were saved in the 
medical record. The selected vein was accessed using micropuncture technique. A guidewire was advance
d centrally. The PICC line was cut to length, and advanced through a  peel-away sheath such that it's
 tip resides at the cavoatrial junction. The peel-away sheath a sheath was removed. The catheter was 
secured in place. The catheter was found to flush and aspirate normally.. Sterile dressings were appl
ied. No immediate complications were identified.



Fluoroscopy time: 0.1 minutes

Dose area product:  1 ycm2



Impression: Successful placement of a right upper extremity PICC line

 



Electronically signed by: Jose Angel Mitchell MD (11/18/2021 3:30 PM) [id number]

## 2021-11-18 NOTE — NUR
[facility] called.  Patient adamant about leaving the hospital to work on his truck.  Walking 
around the halls.  Went into an occupied room.  Laid in bed at Security's request.

## 2021-11-18 NOTE — PDOC
[facility] PROGRESS NOTE


Date of Service


DOS:


DATE: 11/18/21 


TIME: 12:08





Chief Complaint


Chief Complaint


ACE inhibitor induced angioedema


Postoperative wound infection








Continue with IV Solu-Medrol twice daily then transition to prednisone taper on 

11/19/2021


Appreciate infectious disease recommendations for antibiotic management


Pending blood and wound cultures


Would avoid ACE inhibitor for now, possible ARB in the future.


Pending C4 levels and C1 esterase levels





History of Present Illness


History of Present Illness


56-year-old male who underwent lumbar hemilaminectomy of the L3-L4 levels on 

November 3, 2021.  He was actually discharged home the following day was doing 

well.  Unfortunately patient had a surgical site complication with infection he 

was started on Keflex and failed p.o. antibiotics over the weekend.  There was 

increasing pain and serous drainage from incision and he was brought in for 

further evaluation and IV antibiotics.  He was given fentanyl for pain and 

patient was on lisinopril chronically.  Unfortunately patient had angioedema 

most likely with his use of ACE inhibitors.  Infectious diseases consulted for 

antibiotic management.  Patient interviewed bedside and he was doing well.  He 

denies any throat swelling or wheezing or trouble breathing.  He does not have 

any periorbital edema or facial swelling.  Only upper portion of his lip is 

swollen and his tongue is fine.  He is speaking in full sentences without any 

use of his accessory muscles.  Denies fevers, confusion, syncope, chest pain, 

shortness of breath, abdominal pain, diarrhea or hematuria.





11/18/2021


Patient had an acute agitative and confusion episode last night.  Patient ripped

out the sharps container from the wall.  He appears to be close to his baseline 

today and appears lethargic but talking on the phone.  Sitting on the recliner 

and not agitated at this time.  No fevers somewhat tachycardic and no 

hypotensive episodes.  Swelling of his upper lip appears to be improved.  No 

redness or swelling.  Patient will likely benefit from a PICC line placement for

long-term IV antibiotics.  Will defer the timing and course to infectious 

disease.  Patient's chart, labs, images were reviewed and discussed with RN





Vitals/I&O


Vitals/I&O:





                                   Vital Signs








  Date Time  Temp Pulse Resp B/P (MAP) Pulse Ox O2 Delivery O2 Flow Rate FiO2


 


11/18/21 08:41  105  185/116    


 


11/18/21 08:15      Room Air  


 


11/18/21 07:00 98.0  20     





 98.0       


 


11/18/21 03:09     96   














                                    I & O   


 


 11/17/21 11/17/21 11/18/21





 15:00 23:00 07:00


 


Intake Total 525 ml 350 ml 150 ml


 


Output Total 200 ml  0 ml


 


Balance 325 ml 350 ml 150 ml











Physical Exam


Physical Exam:


GENERAL:  Alert, oriented gentleman, not in distress.


VITAL SIGNS:  Stable. 


HEENT:  Both pupils are round and reacting.  No conjunctival lesion. No lesion 


in the mouth.


NECK:  Supple. No JVP. No lymphadenopathy.


LUNGS:  Clear.


HEART:  S1, S2, regular.


ABDOMEN:  Soft, nontender. No organomegaly.


EXTREMITIES:  No edema, cyanosis.


SKIN:  The patient does have some hives from fentanyl and lip swelling.


SPINE: There is incision with drainage present.


NEUROLOGIC:  The patient is alert, awake and appropriate. No focal neurologic 


deficit.


General:  Alert, Cooperative


Heart:  Regular rate


Lungs:  Clear


Abdomen:  Normal bowel sounds


Extremities:  No clubbing


Skin:  Other (dressing intact, some drainage noted)





Labs


Labs:





Laboratory Tests








Test


 11/18/21


09:35


 


White Blood Count


 14.5 x10^3/uL


(4.0-11.0)


 


Red Blood Count


 5.23 x10^6/uL


(4.30-5.70)


 


Hemoglobin


 15.3 g/dL


(13.0-17.5)


 


Hematocrit


 45.5 %


(39.0-53.0)


 


Mean Corpuscular Volume 87 fL () 


 


Mean Corpuscular Hemoglobin 29 pg (25-35) 


 


Mean Corpuscular Hemoglobin


Concent 34 g/dL


(31-37)


 


Red Cell Distribution Width


 14.6 %


(11.5-14.5)


 


Platelet Count


 215 x10^3/uL


(140-400)


 


Neutrophils (%) (Auto) 92 % (31-73) 


 


Lymphocytes (%) (Auto) 5 % (24-48) 


 


Monocytes (%) (Auto) 3 % (0-9) 


 


Eosinophils (%) (Auto) 0 % (0-3) 


 


Basophils (%) (Auto) 0 % (0-3) 


 


Neutrophils # (Auto)


 13.3 x10^3/uL


(1.8-7.7)


 


Lymphocytes # (Auto)


 0.8 x10^3/uL


(1.0-4.8)


 


Monocytes # (Auto)


 0.4 x10^3/uL


(0.0-1.1)


 


Eosinophils # (Auto)


 0.0 x10^3/uL


(0.0-0.7)


 


Basophils # (Auto)


 0.0 x10^3/uL


(0.0-0.2)


 


Sodium Level


 134 mmol/L


(136-145)


 


Potassium Level


 4.2 mmol/L


(3.5-5.1)


 


Chloride Level


 97 mmol/L


()


 


Carbon Dioxide Level


 24 mmol/L


(21-32)


 


Anion Gap 13 (6-14) 


 


Blood Urea Nitrogen


 32 mg/dL


(8-26)


 


Creatinine


 1.6 mg/dL


(0.7-1.3)


 


Estimated GFR


(Cockcroft-Gault) 45.1 





 


Glucose Level


 375 mg/dL


(70-99)


 


Calcium Level


 8.9 mg/dL


(8.5-10.1)


 


Creatine Kinase


 58 U/L


()











Comment


Review of Relevant


I have reviewed the following items susan (where applicable) has been applied.


Medications:





Current Medications








 Medications


  (Trade)  Dose


 Ordered  Sig/Faheem


 Route


 PRN Reason  Start Time


 Stop Time Status Last Admin


Dose Admin


 


 Lactobacillus


 Rhamnosus


  (Culturelle)  1 cap  BID


 PO


   11/17/21 21:00


    11/18/21 08:41





 


 Oxycodone/


 Acetaminophen


  (Percocet 5/325)  2 tab  PRN Q4HRS  PRN


 PO


 SEVERE PAIN  11/17/21 13:45


    11/17/21 22:34





 


 Ziprasidone


  (Geodon)  20 mg  PRN Q2HRS  PRN


 PO


 ANXIETY / AGITATION  11/18/21 00:00


    11/18/21 00:18





 


 Hydralazine HCl


  (Apresoline)  25 mg  TID


 PO


   11/18/21 00:30


    11/18/21 08:40





 


 Insulin Human


 Lispro


  (HumaLOG)  5 units  1X  ONCE


 SQ


   11/18/21 00:30


 11/18/21 00:31 DC 11/18/21 00:19





 


 Ziprasidone


  (Geodon Im)  10 mg  PRN Q2HRS  PRN


 IM


 AGITATION  11/18/21 00:15


    11/18/21 06:05














Justifications for Admission


Other Justification














TAMARA PRATHER MD                  Nov 18, 2021 12:13

## 2021-11-18 NOTE — PDOC
PROGRESS NOTES


Date of Service


DATE: 11/18/21 


TIME: 18:15





Subjective


Subjective


patient seen at 1500


confused and agitated overnight, thinks it was due to Ambien, feels better now


continues to have back pain


has been up ambulating with walker


PICC line placed today





Objective


Objective





Vital Signs








  Date Time  Temp Pulse Resp B/P (MAP) Pulse Ox O2 Delivery O2 Flow Rate FiO2


 


11/18/21 17:07      Room Air  


 


11/18/21 15:00 97.7 96 20 173/103 (126) 96   





 97.7       














Intake and Output 


 


 11/18/21





 07:00


 


Intake Total 1025 ml


 


Output Total 200 ml


 


Balance 825 ml


 


 


 


Intake Oral 1025 ml


 


Output Urine Total 200 ml


 


Stool Total 0 ml


 


# Voids 6











Physical Exam


General:  Alert, Oriented X3, Cooperative


Neuro:  Other (HANNA)


Skin:  Other (dressing intact, recently changed per RN)





Plan


Plan of Care


awaiting sensitivities from cultures


antibiotics per ID


MRI pending


activity as tolerated


D/W RN





Comment


Review of Relevant


I have reviewed the following items susan (where applicable) has been applied.


Labs





Laboratory Tests








Test


 11/16/21


18:35 11/17/21


11:15 11/17/21


13:10 11/18/21


09:00


 


White Blood Count


 17.3 x10^3/uL


(4.0-11.0) 


 


 





 


Red Blood Count


 5.52 x10^6/uL


(4.30-5.70) 


 


 





 


Hemoglobin


 16.1 g/dL


(13.0-17.5) 


 


 





 


Hematocrit


 47.6 %


(39.0-53.0) 


 


 





 


Mean Corpuscular Volume 86 fL ()    


 


Mean Corpuscular Hemoglobin 29 pg (25-35)    


 


Mean Corpuscular Hemoglobin


Concent 34 g/dL


(31-37) 


 


 





 


Red Cell Distribution Width


 14.6 %


(11.5-14.5) 


 


 





 


Platelet Count


 179 x10^3/uL


(140-400) 


 


 





 


Neutrophils (%) (Auto) 86 % (31-73)    


 


Lymphocytes (%) (Auto) 7 % (24-48)    


 


Monocytes (%) (Auto) 7 % (0-9)    


 


Eosinophils (%) (Auto) 1 % (0-3)    


 


Basophils (%) (Auto) 0 % (0-3)    


 


Neutrophils # (Auto)


 14.8 x10^3/uL


(1.8-7.7) 


 


 





 


Lymphocytes # (Auto)


 1.2 x10^3/uL


(1.0-4.8) 


 


 





 


Monocytes # (Auto)


 1.2 x10^3/uL


(0.0-1.1) 


 


 





 


Eosinophils # (Auto)


 0.1 x10^3/uL


(0.0-0.7) 


 


 





 


Basophils # (Auto)


 0.1 x10^3/uL


(0.0-0.2) 


 


 





 


Platelet Estimate


 Adequate


(ADEQUATE) 


 


 





 


Platelet Clumps, EDTA Present    


 


Sodium Level


 135 mmol/L


(136-145) 


 


 





 


Potassium Level


 4.0 mmol/L


(3.5-5.1) 


 


 





 


Chloride Level


 100 mmol/L


() 


 


 





 


Carbon Dioxide Level


 25 mmol/L


(21-32) 


 


 





 


Anion Gap 10 (6-14)    


 


Blood Urea Nitrogen


 24 mg/dL


(8-26) 


 


 





 


Creatinine


 1.7 mg/dL


(0.7-1.3) 


 


 





 


Estimated GFR


(Cockcroft-Gault) 42.1 


 


 


 





 


BUN/Creatinine Ratio 14 (6-20)    


 


Glucose Level


 264 mg/dL


(70-99) 


 


 





 


Calcium Level


 8.2 mg/dL


(8.5-10.1) 


 


 





 


Total Bilirubin


 0.7 mg/dL


(0.2-1.0) 


 


 





 


Aspartate Amino Transf


(AST/SGOT) 11 U/L (15-37) 


 


 


 





 


Alanine Aminotransferase


(ALT/SGPT) 17 U/L (16-63) 


 


 


 





 


Alkaline Phosphatase


 81 U/L


() 


 


 





 


Total Protein


 6.7 g/dL


(6.4-8.2) 


 


 





 


Albumin


 3.6 g/dL


(3.4-5.0) 


 


 





 


Albumin/Globulin Ratio 1.2 (1.0-1.7)    


 


Glucose (Fingerstick)


 


 330 mg/dL


(70-99) 


 





 


Complement C4


 


 


 20 mg/dL


(12-38) 





 


Urine Opiates Screen    Pos (NEG) 


 


Urine Methadone Screen    Neg (NEG) 


 


Urine Barbiturates    Neg (NEG) 


 


Urine Phencyclidine Screen    Neg (NEG) 


 


Urine


Amphetamine/Methamphetamine 


 


 


 Neg (NEG) 





 


Urine Benzodiazepines Screen    Neg (NEG) 


 


Urine Cocaine Screen    Neg (NEG) 


 


Urine Cannabinoids Screen    Neg (NEG) 


 


Urine Ethyl Alcohol    Neg (NEG) 


 


Test


 11/18/21


09:35 11/18/21


12:07 11/18/21


16:32 





 


White Blood Count


 14.5 x10^3/uL


(4.0-11.0) 


 


 





 


Red Blood Count


 5.23 x10^6/uL


(4.30-5.70) 


 


 





 


Hemoglobin


 15.3 g/dL


(13.0-17.5) 


 


 





 


Hematocrit


 45.5 %


(39.0-53.0) 


 


 





 


Mean Corpuscular Volume 87 fL ()    


 


Mean Corpuscular Hemoglobin 29 pg (25-35)    


 


Mean Corpuscular Hemoglobin


Concent 34 g/dL


(31-37) 


 


 





 


Red Cell Distribution Width


 14.6 %


(11.5-14.5) 


 


 





 


Platelet Count


 215 x10^3/uL


(140-400) 


 


 





 


Neutrophils (%) (Auto) 92 % (31-73)    


 


Lymphocytes (%) (Auto) 5 % (24-48)    


 


Monocytes (%) (Auto) 3 % (0-9)    


 


Eosinophils (%) (Auto) 0 % (0-3)    


 


Basophils (%) (Auto) 0 % (0-3)    


 


Neutrophils # (Auto)


 13.3 x10^3/uL


(1.8-7.7) 


 


 





 


Lymphocytes # (Auto)


 0.8 x10^3/uL


(1.0-4.8) 


 


 





 


Monocytes # (Auto)


 0.4 x10^3/uL


(0.0-1.1) 


 


 





 


Eosinophils # (Auto)


 0.0 x10^3/uL


(0.0-0.7) 


 


 





 


Basophils # (Auto)


 0.0 x10^3/uL


(0.0-0.2) 


 


 





 


Segmented Neutrophils % 89 % (35-66)    


 


Band Neutrophils % 2 % (0-9)    


 


Lymphocytes % 7 % (24-48)    


 


Monocytes % 2 % (0-10)    


 


Platelet Estimate


 Adequate


(ADEQUATE) 


 


 





 


Sodium Level


 134 mmol/L


(136-145) 


 


 





 


Potassium Level


 4.2 mmol/L


(3.5-5.1) 


 


 





 


Chloride Level


 97 mmol/L


() 


 


 





 


Carbon Dioxide Level


 24 mmol/L


(21-32) 


 


 





 


Anion Gap 13 (6-14)    


 


Blood Urea Nitrogen


 32 mg/dL


(8-26) 


 


 





 


Creatinine


 1.6 mg/dL


(0.7-1.3) 


 


 





 


Estimated GFR


(Cockcroft-Gault) 45.1 


 


 


 





 


Glucose Level


 375 mg/dL


(70-99) 


 


 





 


Calcium Level


 8.9 mg/dL


(8.5-10.1) 


 


 





 


Creatine Kinase


 58 U/L


() 


 


 





 


Glucose (Fingerstick)


 


 256 mg/dL


(70-99) 221 mg/dL


(70-99) 











Laboratory Tests








Test


 11/18/21


09:00 11/18/21


09:35 11/18/21


12:07 11/18/21


16:32


 


Urine Opiates Screen Pos (NEG)    


 


Urine Methadone Screen Neg (NEG)    


 


Urine Barbiturates Neg (NEG)    


 


Urine Phencyclidine Screen Neg (NEG)    


 


Urine


Amphetamine/Methamphetamine Neg (NEG) 


 


 


 





 


Urine Benzodiazepines Screen Neg (NEG)    


 


Urine Cocaine Screen Neg (NEG)    


 


Urine Cannabinoids Screen Neg (NEG)    


 


Urine Ethyl Alcohol Neg (NEG)    


 


White Blood Count


 


 14.5 x10^3/uL


(4.0-11.0) 


 





 


Red Blood Count


 


 5.23 x10^6/uL


(4.30-5.70) 


 





 


Hemoglobin


 


 15.3 g/dL


(13.0-17.5) 


 





 


Hematocrit


 


 45.5 %


(39.0-53.0) 


 





 


Mean Corpuscular Volume  87 fL ()   


 


Mean Corpuscular Hemoglobin  29 pg (25-35)   


 


Mean Corpuscular Hemoglobin


Concent 


 34 g/dL


(31-37) 


 





 


Red Cell Distribution Width


 


 14.6 %


(11.5-14.5) 


 





 


Platelet Count


 


 215 x10^3/uL


(140-400) 


 





 


Neutrophils (%) (Auto)  92 % (31-73)   


 


Lymphocytes (%) (Auto)  5 % (24-48)   


 


Monocytes (%) (Auto)  3 % (0-9)   


 


Eosinophils (%) (Auto)  0 % (0-3)   


 


Basophils (%) (Auto)  0 % (0-3)   


 


Neutrophils # (Auto)


 


 13.3 x10^3/uL


(1.8-7.7) 


 





 


Lymphocytes # (Auto)


 


 0.8 x10^3/uL


(1.0-4.8) 


 





 


Monocytes # (Auto)


 


 0.4 x10^3/uL


(0.0-1.1) 


 





 


Eosinophils # (Auto)


 


 0.0 x10^3/uL


(0.0-0.7) 


 





 


Basophils # (Auto)


 


 0.0 x10^3/uL


(0.0-0.2) 


 





 


Segmented Neutrophils %  89 % (35-66)   


 


Band Neutrophils %  2 % (0-9)   


 


Lymphocytes %  7 % (24-48)   


 


Monocytes %  2 % (0-10)   


 


Platelet Estimate


 


 Adequate


(ADEQUATE) 


 





 


Sodium Level


 


 134 mmol/L


(136-145) 


 





 


Potassium Level


 


 4.2 mmol/L


(3.5-5.1) 


 





 


Chloride Level


 


 97 mmol/L


() 


 





 


Carbon Dioxide Level


 


 24 mmol/L


(21-32) 


 





 


Anion Gap  13 (6-14)   


 


Blood Urea Nitrogen


 


 32 mg/dL


(8-26) 


 





 


Creatinine


 


 1.6 mg/dL


(0.7-1.3) 


 





 


Estimated GFR


(Cockcroft-Gault) 


 45.1 


 


 





 


Glucose Level


 


 375 mg/dL


(70-99) 


 





 


Calcium Level


 


 8.9 mg/dL


(8.5-10.1) 


 





 


Creatine Kinase


 


 58 U/L


() 


 





 


Glucose (Fingerstick)


 


 


 256 mg/dL


(70-99) 221 mg/dL


(70-99)








Microbiology


11/16/21 Gram Stain - Final, Resulted


           


11/16/21 Aerobic and Anaerobic Culture - Preliminary, Resulted


Medications





Current Medications


Fentanyl Citrate (Fentanyl 2ml Vial) 50 mcg PRN Q2HR  PRN IVP PAIN Last 

administered on 11/17/21at 02:11;  Start 11/16/21 at 16:00;  Stop 11/17/21 at 

03:01;  Status DC


Aspirin (Aspirin Chewable) 81 mg DAILY PO  Last administered on 11/18/21at 

08:39;  Start 11/17/21 at 09:00


Clonidine HCl (Catapres Tts-2) 1 patch WEEKLY TD  Last administered on 1 1/16/21at 16:26;  Start 11/16/21 at 17:00


Docusate Sodium (Colace) 100 mg BID PO  Last administered on 11/18/21at 08:46;  

Start 11/16/21 at 21:00


EZETIMIBE (Zetia) 10 mg DAILY PO  Last administered on 11/18/21at 08:45;  Start 

11/17/21 at 09:00


Hydralazine HCl (Apresoline) 50 mg DAILY PO  Last administered on 11/18/21at 

08:41;  Start 11/17/21 at 09:00;  Stop 11/18/21 at 14:47;  Status DC


Hydrochlorothiazide (Microzide) 12.5 mg DAILY PO  Last administered on 

11/18/21at 08:39;  Start 11/17/21 at 09:00


Acetaminophen/ Hydrocodone Bitart (Lortab 7.5/325) 2 tab PRN Q4HRS  PRN PO PAIN 

Last administered on 11/17/21at 10:25;  Start 11/16/21 at 16:00;  Stop 11/17/21 

at 13:34;  Status DC


Lisinopril (Prinivil) 20 mg DAILY PO ;  Start 11/17/21 at 09:00;  Stop 11/17/21 

at 06:31;  Status DC


Methocarbamol (Robaxin) 750 mg TID PRN  PRN PO MUSCLE SPASMS Last administered 

on 11/16/21at 19:41;  Start 11/16/21 at 16:00


Atorvastatin Calcium (Lipitor) 80 mg DAILY PO  Last administered on 11/18/21at 

08:39;  Start 11/17/21 at 09:00


Non-Formulary Medication (Empagliflozin (Jardiance)) 25 mg DAILY PO ;  Start 

11/17/21 at 09:00;  Stop 11/17/21 at 18:17;  Status DC


Febuxostat (Uloric) 80 mg DAILY PO  Last administered on 11/18/21at 08:41;  

Start 11/17/21 at 09:00


Non-Formulary Medication (Icosapent Ethyl (Vascepa)) 1 gm BID PO ;  Start 

11/16/21 at 21:00;  Stop 11/17/21 at 18:18;  Status DC


Metoprolol Tartrate (Lopressor) 50 mg BID PO  Last administered on 11/18/21at 

08:40;  Start 11/16/21 at 21:00


Probenecid (Benemid) 500 mg BID PO  Last administered on 11/18/21at 08:39;  

Start 11/16/21 at 21:00


Non-Formulary Medication (Semaglutide (Ozempic)) 1 mg WEEKLY SQ ;  Start 

11/23/21 at 09:00;  Status UNV


Zolpidem Tartrate (Ambien) 5 mg PRN QHS  PRN PO INSOMNIA, MAY REPEAT X1 Last 

administered on 11/17/21at 21:00;  Start 11/16/21 at 16:15


Insulin Human Lispro (HumaLOG) 0-9 UNITS TIDWMEALS SQ  Last administered on 

11/18/21at 17:16;  Start 11/16/21 at 00:00


Dextrose (Dextrose 50%-Water Syringe) 12.5 gm PRN Q15MIN  PRN IV SEE COMMENTS;  

Start 11/16/21 at 20:45


Diphenhydramine HCl (Benadryl) 50 mg PRN Q6HRS  PRN IVP ITCHING, 1ST CHOICE Last

administered on 11/17/21at 18:42;  Start 11/17/21 at 03:00


Diphenhydramine HCl (Benadryl) 100 mg PRN Q6HRS  PRN IVP ITCHING, 2ND CHOICE;  

Start 11/17/21 at 03:00


Morphine Sulfate (Morphine Sulfate) 2 mg PRN Q2HR  PRN IVP PAIN Last 

administered on 11/18/21at 17:07;  Start 11/17/21 at 03:00


Famotidine (Pepcid Vial) 20 mg BID IVP  Last administered on 11/17/21at 20:56;  

Start 11/17/21 at 07:00


Methylprednisolone Sodium Succinate (SOLU-Medrol 40MG VIAL) 40 mg Q12HR IV  Last

administered on 11/18/21at 15:00;  Start 11/17/21 at 07:00


Daptomycin 640 mg/ Sodium Chloride 50 ml @  100 mls/hr Q24H IV  Last 

administered on 11/17/21at 11:01;  Start 11/17/21 at 10:00;  Stop 11/17/21 at 

14:59;  Status DC


Piperacillin Sod/ Tazobactam Sod 3.375 gm/Sodium Chloride 50 ml @  100 mls/hr 

Q6HRS IV  Last administered on 11/18/21at 17:06;  Start 11/17/21 at 10:00


Lactobacillus Rhamnosus (Culturelle) 1 cap BID PO  Last administered on 

11/18/21at 08:41;  Start 11/17/21 at 21:00


Oxycodone/ Acetaminophen (Percocet 5/325) 1 tab PRN Q4HRS  PRN PO MODERATE PAIN;

 Start 11/17/21 at 13:45


Oxycodone/ Acetaminophen (Percocet 5/325) 2 tab PRN Q4HRS  PRN PO SEVERE PAIN 

Last administered on 11/18/21at 14:56;  Start 11/17/21 at 13:45


Daptomycin 500 mg/ Sodium Chloride 50 ml @  100 mls/hr Q24H IV  Last 

administered on 11/18/21at 14:01;  Start 11/18/21 at 09:00


Labetalol HCl (Normodyne Iv Push) 10 mg PRN Q15MIN  PRN IVP HYPERTENSION;  Start

11/18/21 at 00:00


Ziprasidone (Geodon) 20 mg PRN Q2HRS  PRN PO ANXIETY / AGITATION Last 

administered on 11/18/21at 00:18;  Start 11/18/21 at 00:00


Hydralazine HCl (Apresoline) 25 mg TID PO  Last administered on 11/18/21at 

14:59;  Start 11/18/21 at 00:30


Insulin Human Lispro (HumaLOG) 5 units 1X  ONCE SQ  Last administered on 

11/18/21at 00:19;  Start 11/18/21 at 00:30;  Stop 11/18/21 at 00:31;  Status DC


Ziprasidone (Geodon Im) 10 mg PRN Q2HRS  PRN IM AGITATION Last administered on 

11/18/21at 06:05;  Start 11/18/21 at 00:15


Lidocaine HCl (Buffered Lidocaine 1%) 3 ml STK-MED ONCE .ROUTE ;  Start 11/18/21

at 12:11;  Stop 11/18/21 at 12:11;  Status DC


Lidocaine HCl (Buffered Lidocaine 1%) 3 ml STK-MED ONCE .ROUTE ;  Start 11/18/21

at 12:21;  Stop 11/18/21 at 12:22;  Status DC


Lidocaine HCl (Buffered Lidocaine 1%) 6 ml 1X  ONCE INJ  Last administered on 

11/18/21at 13:10;  Start 11/18/21 at 12:30;  Stop 11/18/21 at 12:34;  Status DC


Gadoterate Meglumine (Clariscan) 20 ml 1X  ONCE IVP  Last administered on 

11/18/21at 15:55;  Start 11/18/21 at 15:30;  Stop 11/18/21 at 15:31;  Status DC





Active Scripts


Active


Colace (Docusate Sodium) 100 Mg Capsule 100 Mg PO BID 60 Days


Methocarbamol 750 Mg Tablet 750 Mg PO TID PRN PRN


Reported


[V-Go 40 Day Kit ]   Units SQ Q1HR


     5 CLICKS @ MEALS


     2 CLICKS @ SNACKS


Freestyle Lite Test Strip (Blood Sugar Diagnostic) 1 Each Strip 1 Strip MC DAILY

30 Days


Hydrocodone-Apap 7.5-325  ** (Hydrocodone Bit/Acetaminophen) 1 Tab Tablet 1-2 

Tab PO PRN Q4HRS PRN


Vascepa (Icosapent Ethyl) 1 Gm Capsule 1 Gm PO BID


Zolpidem Tartrate Er (Zolpidem Tartrate) 12.5 Mg Tab.mphase 12.5 Mg PO PRN QHS 

PRN


Probenecid 500 Mg Tablet 500 Mg PO BID


Hydrochlorothiazide Capsule  ** (Hydrochlorothiazide) 12.5 Mg Capsule 12.5 Mg PO

DAILY


Hydralazine Hcl 50 Mg Tablet 1 Tab PO DAILY


Uloric (Febuxostat) 80 Mg Tablet 1 Tab PO DAILY 30 Days


Clonidine Tts-2  ** (Clonidine) 1 Each Patch.tdwk 1 Patch TD WEEKLY


Bystolic (Nebivolol Hcl) 20 Mg Tablet 20 Mg PO DAILY


Aspirin 81 Mg Tab.chew 1 Tab PO DAILY


Zetia (Ezetimibe) 10 Mg Tablet 10 Mg PO DAILY


Zestril (Lisinopril) 20 Mg Tablet 1 Tab PO DAILY 30 Days


Ozempic (Semaglutide) 1 Mg/0.75 Ml Pen.injctr 1 Mg SQ WEEKLY


     SATURDAY


Lipitor (Atorvastatin Calcium) 80 Mg Tablet 1 Tab PO DAILY


Jardiance (Empagliflozin) 25 Mg Tablet 25 Mg PO DAILY


Vitals/I & O





Vital Sign - Last 24 Hours








 11/17/21 11/17/21 11/17/21 11/17/21





 18:30 19:00 20:23 20:51


 


Temp  98.0  





  98.0  


 


Pulse  95  


 


Resp  18  24


 


B/P (MAP)  161/99 (119)  


 


Pulse Ox  96  


 


O2 Delivery Room Air Room Air Room Air Room Air


 


    





    





 11/17/21 11/17/21 11/17/21 11/17/21





 21:01 21:30 22:34 23:00


 


Pulse 95   96


 


Resp  24 24 


 


B/P (MAP) 161/99   180/110 (133)


 


O2 Delivery  Room Air Room Air 





 11/17/21 11/17/21 11/18/21 11/18/21





 23:09 23:31 00:18 03:09


 


Temp    96.8





    96.8


 


Pulse    108


 


Resp 20   18


 


B/P (MAP)  189/118 (141) 189/116 164/101 (122)


 


Pulse Ox    96


 


O2 Delivery Room Air   Room Air


 


    





    





 11/18/21 11/18/21 11/18/21 11/18/21





 07:00 08:15 08:40 08:40


 


Temp 98.0   





 98.0   


 


Pulse 110  105 105


 


Resp 20   


 


B/P (MAP) 189/125 (146)  185/116 185/116


 


O2 Delivery Room Air Room Air  


 


    





    





 11/18/21 11/18/21 11/18/21 11/18/21





 08:41 11:00 13:49 14:20


 


Temp  97.5  





  97.5  


 


Pulse 105 95  


 


Resp  20  


 


B/P (MAP) 185/116 164/99 (120)  


 


Pulse Ox  98  


 


O2 Delivery  Room Air Room Air Room Air


 


    





    





 11/18/21 11/18/21 11/18/21 11/18/21





 14:56 14:59 15:00 17:07


 


Temp   97.7 





   97.7 


 


Pulse  86 96 


 


Resp   20 


 


B/P (MAP)  181/107 173/103 (126) 


 


Pulse Ox   96 


 


O2 Delivery Room Air  Room Air Room Air














Intake and Output   


 


 11/17/21 11/17/21 11/18/21





 15:00 23:00 07:00


 


Intake Total 525 ml 350 ml 150 ml


 


Output Total 200 ml  0 ml


 


Balance 325 ml 350 ml 150 ml











Justifications for Admission


Other Justification














ALIZA CHRIS APRN          Nov 18, 2021 18:23

## 2021-11-18 NOTE — PDOC
Infectious Disease Note


Subjective


Subjective


Patient is feeling good has less back pain says no other complaints.  He did 

have a episode last night of severe confusion and agitation and he was wandering

on a second floor as well as he ripped sharps container.





ROS


ROS


No nausea vomiting diarrhea chest pain shortness of breath or fever or headache





Vital Sign


Vital Signs





Vital Signs








  Date Time  Temp Pulse Resp B/P (MAP) Pulse Ox O2 Delivery O2 Flow Rate FiO2


 


11/18/21 07:00 98.0 110 20 189/125 (146)  Room Air  





 98.0       


 


11/18/21 03:09     96   











Physical Exam


PHYSICAL EXAM


GENERAL:  Alert, oriented gentleman, not in distress.


VITAL SIGNS:  Stable. 


HEENT:  Both pupils are round and reacting.  No conjunctival lesion. No lesion 


in the mouth.


NECK:  Supple. No JVP. No lymphadenopathy.


LUNGS:  Clear.


HEART:  S1, S2, regular.


ABDOMEN:  Soft, nontender. No organomegaly.


EXTREMITIES:  No edema, cyanosis.


SKIN:  The patient does have some hives from fentanyl and lip swelling.


SPINE: There is incision with drainage present.


NEUROLOGIC:  The patient is alert, awake and appropriate. No focal neurologic 


deficit.





Labs


Lab





Laboratory Tests








Test


 11/17/21


11:15


 


Glucose (Fingerstick)


 330 mg/dL


(70-99)








Micro





  GRAM STAIN  Final  


        Final





        NO ORGANISMS SEEN.


        SQUAMOUS EPI CELL:RARE


        PMN (WBCs):RARE


        Unless otherwise specified, Testing Performed by:


        [address] [address]


        For Inquires, the Physician may contact the Microbiology


        department at [phone]





  ANAEROBIC-AEROBIC CULTURE  Preliminary  


        Preliminary





        FEW GRAM POSITIVE COCCI on 11/18/21 at 0822


        FINAL ID= [[email or web address] [email or web address]]


        STAPHYLOCOCCUS AUREUS


        Unless otherwise specified, Testing Performed by:


        [address] [address]


        For Inquires, the Physician may contact the Microbiology


        department at [phone]





----------------------------------------------------





Objective


Assessment


IMPRESSION:


1.  Postsurgical spine infection.


2.  Status post right-sided hemilaminotomy and microdiscectomy L3-L4 done on 


11/03/2021.


3.  Diabetes mellitus.


4.  Hypertension.


5.  Leukocytosis.


6.  Renal insufficiency.





Plan


Plan of Care


Agitation and encephalopathy of unclear etiology, we should do drug screen


Patient is back to his normal


Continue antibiotics


If no more episodes then hopefully tomorrow with staph aureus susceptibilities 

are known will discharge on IV antibiotics











LIVIER PRITCHETT MD               Nov 18, 2021 08:23

## 2021-11-18 NOTE — NUR
Ambulating in hallways with his phone in hand and car keys. Going into other patients rooms. 
Had to redirect pt back to room. Ambulating with steady gait. Cont. monitor.

## 2021-11-18 NOTE — RAD
EXAM: MRI LUMBAR SPINE WITH AND WITHOUT CONTRAST.



HISTORY: Postoperative infection, lumbar surgery.



TECHNIQUE: Magnetic resonance images of the lumbar spine were obtained before and after the intraveno
us administration of 20 mL Gadavist.



COMPARISON: None.



FINDINGS: Alignment is normal. No fractures are identified. Intervertebral disc heights are maintaine
d. The conus is at L1 and appears normal. There are no enhancing parenchymal lesions.



From T12-L3, there is no stenosis.



At L3-4, there are changes of right hemilaminectomy with resection of the right inferior articular pr
ocess. There is a small fluid collection within the resection bed measuring 1.9 x 1.6 cm. There is a 
small perforation of the right ligamentum flavum. There is mild narrowing of the thecal sac and right
 lateral recess. Right neural foraminal stenosis is moderate.



At L4-5, there is a small posterior disc bulge. There is mild ligamentum flavum hypertrophy. Lateral 
recess stenosis is mild on the right greater than left.



At L5-S1, there is a small right paracentral inferior extrusion. This narrows the right lateral reces
s mildly. There is mild bilateral neural foraminal narrowing.



IMPRESSION: 

1. Right L3-4 laminectomies with resection of the inferior articular process. A small fluid collectio
n fills the resection bed at L3-4 without significant mass effect. There is moderate residual right n
eural foraminal stenosis and mild right recess stenosis, though specificity is low in the postoperati
ve setting.

2. Small inferior disc extrusion in the right lateral recess of L5-S1.



Electronically signed by: LIBBY Ridley MD (11/18/2021 4:46 PM) [facility]

## 2021-11-18 NOTE — NUR
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 
truck.  Security here, patient assisted into recliner, Isaiah WALTER by DERECK Franco.

## 2021-11-18 NOTE — NUR
Patient's /118.  Patient is confused and agitated.  Found wandering the halls at one 
point, states he needs to get in his truck to get his mask.  Believes he is in [place].  Found 
him with his home med organizer and pills in his hand.

FSBS 311. 

Dr Gamboa notified of patient status and orders rec'd.  

Meds sent to Pharmacy for ID.  No narcs or benzos found.  

Geodon and Apresoline given.  

Surgical incision cleansed and dressing changed earlier.  Continues to ooze.

## 2021-11-18 NOTE — NUR
Laying in bed, eyes closed.  Wakes easily, confusion continues. "Did you guys take pictures 
of the computer?"  bed alarm set.

## 2021-11-18 NOTE — NUR
Unable to find patient. Called security to locate pt's. Pt was found on [address] wondering 
around. Wasn't sure where he was going. Returned to room. and constantly needed to be 
monitored.

## 2021-11-18 NOTE — RAD
Date: 11/18/2021



Exam: Fluoroscopic and ultrasound guided peripheral central venous catheter placement.

 

Indication:  



Consent: The procedure was explained in its entirety to the patient or the patients designated repres
entative by a member of the treatment team, including a discussion of the risks, benefits and commonl
y accepted alternatives to the procedure, as well as the expected consequences of no therapy whatsoev
er.   Discussion of the risks included, but was not limited to, those that are most frequent and thos
e that are rare but possibly severe or life-threatening, as well as the possibility of unforeseen com
plications.  



Discussion: 



A timeout procedure was performed.   The patient was prepped and draped using maximum sterile techniq
ue, including the use of: Current guideline approved cutaneous antisepsis, a large sterile sheet to e
stablish a sterile field. Additionally the  wore a hat, mask, sterile gloves, a sterile gown 
during the procedure as well as practiced acceptable hand hygiene prior to placing the line. 1% lidoc
sveta was administered for local anesthesia. 



Ultrasound evaluation demonstrates a patent  right cephalic vein. Reference images were saved in the 
medical record. The selected vein was accessed using micropuncture technique. A guidewire was advance
d centrally. The PICC line was cut to length, and advanced through a  peel-away sheath such that it's
 tip resides at the cavoatrial junction. The peel-away sheath a sheath was removed. The catheter was 
secured in place. The catheter was found to flush and aspirate normally.. Sterile dressings were appl
ied. No immediate complications were identified.



Fluoroscopy time: 0.1 minutes

Dose area product:  1 ycm2



Impression: Successful placement of a right upper extremity PICC line

 



Electronically signed by: Jose Angel Mitchell MD (11/18/2021 3:30 PM) [id number]

## 2021-11-18 NOTE — NUR
Pt better and not so  confused. Following comands and apologizing for his behavior. Pt 
doesn't really remember much. Spoke with close family friend and stated pt gets crazy when 
taking Ambien. Pt still takes it at home. When asked he stated its the only thing that makes 
him sleep.

## 2021-11-18 NOTE — NUR
[employer] called.  Patient confused and agitated.  Moving furniture in the room.  Wants to 
walk in the halls.  Aggressive toward security guards.  unable to use reason and logic.  
Geodon given IM.

## 2021-11-19 VITALS — DIASTOLIC BLOOD PRESSURE: 92 MMHG | SYSTOLIC BLOOD PRESSURE: 158 MMHG

## 2021-11-19 VITALS — SYSTOLIC BLOOD PRESSURE: 189 MMHG | DIASTOLIC BLOOD PRESSURE: 117 MMHG

## 2021-11-19 VITALS — DIASTOLIC BLOOD PRESSURE: 121 MMHG | SYSTOLIC BLOOD PRESSURE: 191 MMHG

## 2021-11-19 VITALS — SYSTOLIC BLOOD PRESSURE: 210 MMHG | DIASTOLIC BLOOD PRESSURE: 132 MMHG

## 2021-11-19 VITALS — SYSTOLIC BLOOD PRESSURE: 160 MMHG | DIASTOLIC BLOOD PRESSURE: 100 MMHG

## 2021-11-19 VITALS — DIASTOLIC BLOOD PRESSURE: 97 MMHG | SYSTOLIC BLOOD PRESSURE: 169 MMHG

## 2021-11-19 LAB
ANION GAP SERPL CALC-SCNC: 12 MMOL/L (ref 6–14)
BASOPHILS # BLD AUTO: 0 X10^3/UL (ref 0–0.2)
BASOPHILS NFR BLD: 0 % (ref 0–3)
BUN SERPL-MCNC: 32 MG/DL (ref 8–26)
CALCIUM SERPL-MCNC: 9.2 MG/DL (ref 8.5–10.1)
CHLORIDE SERPL-SCNC: 96 MMOL/L (ref 98–107)
CO2 SERPL-SCNC: 26 MMOL/L (ref 21–32)
CREAT SERPL-MCNC: 1.7 MG/DL (ref 0.7–1.3)
EOSINOPHIL NFR BLD: 0 % (ref 0–3)
EOSINOPHIL NFR BLD: 0 X10^3/UL (ref 0–0.7)
ERYTHROCYTE [DISTWIDTH] IN BLOOD BY AUTOMATED COUNT: 14.8 % (ref 11.5–14.5)
GFR SERPLBLD BASED ON 1.73 SQ M-ARVRAT: 42.1 ML/MIN
GLUCOSE SERPL-MCNC: 374 MG/DL (ref 70–99)
HCT VFR BLD CALC: 48.8 % (ref 39–53)
HGB BLD-MCNC: 16.1 G/DL (ref 13–17.5)
LYMPHOCYTES # BLD: 0.7 X10^3/UL (ref 1–4.8)
LYMPHOCYTES NFR BLD AUTO: 5 % (ref 24–48)
MCH RBC QN AUTO: 29 PG (ref 25–35)
MCHC RBC AUTO-ENTMCNC: 33 G/DL (ref 31–37)
MCV RBC AUTO: 87 FL (ref 79–100)
MONO #: 0.5 X10^3/UL (ref 0–1.1)
MONOCYTES NFR BLD: 4 % (ref 0–9)
NEUT #: 12.8 X10^3/UL (ref 1.8–7.7)
NEUTROPHILS NFR BLD AUTO: 91 % (ref 31–73)
PLATELET # BLD AUTO: 289 X10^3/UL (ref 140–400)
POTASSIUM SERPL-SCNC: 4.6 MMOL/L (ref 3.5–5.1)
RBC # BLD AUTO: 5.61 X10^6/UL (ref 4.3–5.7)
SODIUM SERPL-SCNC: 134 MMOL/L (ref 136–145)
WBC # BLD AUTO: 14 X10^3/UL (ref 4–11)

## 2021-11-19 RX ADMIN — OXYCODONE HYDROCHLORIDE AND ACETAMINOPHEN PRN TAB: 5; 325 TABLET ORAL at 08:01

## 2021-11-19 RX ADMIN — MORPHINE SULFATE PRN MG: 2 INJECTION, SOLUTION INTRAMUSCULAR; INTRAVENOUS at 08:15

## 2021-11-19 RX ADMIN — PIPERACILLIN SODIUM AND TAZOBACTAM SODIUM SCH MLS/HR: 3; .375 INJECTION, POWDER, LYOPHILIZED, FOR SOLUTION INTRAVENOUS at 06:16

## 2021-11-19 RX ADMIN — METOPROLOL TARTRATE SCH MG: 50 TABLET, FILM COATED ORAL at 08:03

## 2021-11-19 RX ADMIN — DOCUSATE SODIUM SCH MG: 100 CAPSULE, LIQUID FILLED ORAL at 21:00

## 2021-11-19 RX ADMIN — OXYCODONE HYDROCHLORIDE AND ACETAMINOPHEN PRN TAB: 5; 325 TABLET ORAL at 14:31

## 2021-11-19 RX ADMIN — FEBUXOSTAT SCH MG: 40 TABLET ORAL at 08:02

## 2021-11-19 RX ADMIN — INSULIN LISPRO SCH UNITS: 100 INJECTION, SOLUTION INTRAVENOUS; SUBCUTANEOUS at 12:34

## 2021-11-19 RX ADMIN — DOCUSATE SODIUM SCH MG: 100 CAPSULE, LIQUID FILLED ORAL at 08:11

## 2021-11-19 RX ADMIN — MORPHINE SULFATE PRN MG: 2 INJECTION, SOLUTION INTRAMUSCULAR; INTRAVENOUS at 10:10

## 2021-11-19 RX ADMIN — MORPHINE SULFATE PRN MG: 2 INJECTION, SOLUTION INTRAMUSCULAR; INTRAVENOUS at 19:56

## 2021-11-19 RX ADMIN — MORPHINE SULFATE PRN MG: 2 INJECTION, SOLUTION INTRAMUSCULAR; INTRAVENOUS at 22:33

## 2021-11-19 RX ADMIN — PIPERACILLIN SODIUM AND TAZOBACTAM SODIUM SCH MLS/HR: 3; .375 INJECTION, POWDER, LYOPHILIZED, FOR SOLUTION INTRAVENOUS at 00:01

## 2021-11-19 RX ADMIN — FAMOTIDINE SCH MG: 10 INJECTION, SOLUTION INTRAVENOUS at 20:10

## 2021-11-19 RX ADMIN — INSULIN GLARGINE SCH UNIT: 100 INJECTION, SOLUTION SUBCUTANEOUS at 10:15

## 2021-11-19 RX ADMIN — MORPHINE SULFATE PRN MG: 2 INJECTION, SOLUTION INTRAMUSCULAR; INTRAVENOUS at 06:17

## 2021-11-19 RX ADMIN — METHOCARBAMOL PRN MG: 750 TABLET ORAL at 10:10

## 2021-11-19 RX ADMIN — INSULIN LISPRO SCH UNITS: 100 INJECTION, SOLUTION INTRAVENOUS; SUBCUTANEOUS at 17:00

## 2021-11-19 RX ADMIN — DAPTOMYCIN SCH MLS/HR: 500 INJECTION, POWDER, LYOPHILIZED, FOR SOLUTION INTRAVENOUS at 10:09

## 2021-11-19 RX ADMIN — Medication SCH CAP: at 08:01

## 2021-11-19 RX ADMIN — OXYCODONE HYDROCHLORIDE AND ACETAMINOPHEN PRN TAB: 5; 325 TABLET ORAL at 03:06

## 2021-11-19 RX ADMIN — MORPHINE SULFATE PRN MG: 2 INJECTION, SOLUTION INTRAMUSCULAR; INTRAVENOUS at 02:01

## 2021-11-19 RX ADMIN — LABETALOL HYDROCHLORIDE PRN MG: 5 INJECTION, SOLUTION INTRAVENOUS at 08:04

## 2021-11-19 RX ADMIN — MORPHINE SULFATE PRN MG: 2 INJECTION, SOLUTION INTRAMUSCULAR; INTRAVENOUS at 04:13

## 2021-11-19 RX ADMIN — ZIPRASIDONE HCL PRN MG: 20 CAPSULE ORAL at 10:10

## 2021-11-19 RX ADMIN — INSULIN GLARGINE SCH UNIT: 100 INJECTION, SOLUTION SUBCUTANEOUS at 21:00

## 2021-11-19 RX ADMIN — PROBENECID SCH MG: 500 TABLET, FILM COATED ORAL at 20:08

## 2021-11-19 RX ADMIN — ASPIRIN 81 MG SCH MG: 81 TABLET ORAL at 08:02

## 2021-11-19 RX ADMIN — METHYLPREDNISOLONE SODIUM SUCCINATE SCH MG: 40 INJECTION, POWDER, FOR SOLUTION INTRAMUSCULAR; INTRAVENOUS at 08:02

## 2021-11-19 RX ADMIN — MORPHINE SULFATE PRN MG: 2 INJECTION, SOLUTION INTRAMUSCULAR; INTRAVENOUS at 14:31

## 2021-11-19 RX ADMIN — ATORVASTATIN CALCIUM SCH MG: 40 TABLET, FILM COATED ORAL at 08:01

## 2021-11-19 RX ADMIN — MORPHINE SULFATE PRN MG: 2 INJECTION, SOLUTION INTRAMUSCULAR; INTRAVENOUS at 12:23

## 2021-11-19 RX ADMIN — FAMOTIDINE SCH MG: 10 INJECTION, SOLUTION INTRAVENOUS at 08:02

## 2021-11-19 RX ADMIN — METOPROLOL TARTRATE SCH MG: 50 TABLET, FILM COATED ORAL at 20:09

## 2021-11-19 RX ADMIN — METHOCARBAMOL PRN MG: 750 TABLET ORAL at 01:01

## 2021-11-19 RX ADMIN — INSULIN LISPRO SCH UNITS: 100 INJECTION, SOLUTION INTRAVENOUS; SUBCUTANEOUS at 08:22

## 2021-11-19 RX ADMIN — PROBENECID SCH MG: 500 TABLET, FILM COATED ORAL at 08:02

## 2021-11-19 RX ADMIN — LABETALOL HYDROCHLORIDE PRN MG: 5 INJECTION, SOLUTION INTRAVENOUS at 03:07

## 2021-11-19 RX ADMIN — EZETIMIBE SCH MG: 10 TABLET ORAL at 08:01

## 2021-11-19 RX ADMIN — OXYCODONE HYDROCHLORIDE AND ACETAMINOPHEN PRN TAB: 5; 325 TABLET ORAL at 18:34

## 2021-11-19 RX ADMIN — Medication SCH CAP: at 20:09

## 2021-11-19 RX ADMIN — MORPHINE SULFATE PRN MG: 2 INJECTION, SOLUTION INTRAMUSCULAR; INTRAVENOUS at 00:01

## 2021-11-19 NOTE — PDOC
PROGRESS NOTES


Date of Service


DATE: 11/19/21 


TIME: 14:10





Subjective


Subjective


up in chair


continued back pain


blood sugars remain elevated, blood pressure elevated





Objective


Objective





Vital Signs








  Date Time  Temp Pulse Resp B/P (MAP) Pulse Ox O2 Delivery O2 Flow Rate FiO2


 


11/19/21 12:53      Room Air  


 


11/19/21 11:00 98.2 96 18 160/100 (120) 98   





 98.2       














Intake and Output 


 


 11/19/21





 07:00


 


Intake Total 1100 ml


 


Output Total 900 ml


 


Balance 200 ml


 


 


 


Intake Oral 1100 ml


 


Output Urine Total 900 ml


 


# Voids 1











Physical Exam


General:  Alert, Oriented X3, Cooperative, Other (Facial swelling resolved)


MUSCULOSKELETAL:  Other (HANNA)


Neuro:  Normal speech


Skin:  Other (dressing intact, has been recently changed per RN)





Plan


Plan of Care


Changes have been made to insulin and BP medication


encouraged activity as tolerated


IV antibiotics per ID





Comment


Review of Relevant


I have reviewed the following items susan (where applicable) has been applied.


Labs





Laboratory Tests








Test


 11/18/21


09:00 11/18/21


09:35 11/18/21


12:07 11/18/21


16:32


 


Urine Opiates Screen Pos (NEG)    


 


Urine Methadone Screen Neg (NEG)    


 


Urine Barbiturates Neg (NEG)    


 


Urine Phencyclidine Screen Neg (NEG)    


 


Urine


Amphetamine/Methamphetamine Neg (NEG) 


 


 


 





 


Urine Benzodiazepines Screen Neg (NEG)    


 


Urine Cocaine Screen Neg (NEG)    


 


Urine Cannabinoids Screen Neg (NEG)    


 


Urine Ethyl Alcohol Neg (NEG)    


 


White Blood Count


 


 14.5 x10^3/uL


(4.0-11.0) 


 





 


Red Blood Count


 


 5.23 x10^6/uL


(4.30-5.70) 


 





 


Hemoglobin


 


 15.3 g/dL


(13.0-17.5) 


 





 


Hematocrit


 


 45.5 %


(39.0-53.0) 


 





 


Mean Corpuscular Volume  87 fL ()   


 


Mean Corpuscular Hemoglobin  29 pg (25-35)   


 


Mean Corpuscular Hemoglobin


Concent 


 34 g/dL


(31-37) 


 





 


Red Cell Distribution Width


 


 14.6 %


(11.5-14.5) 


 





 


Platelet Count


 


 215 x10^3/uL


(140-400) 


 





 


Neutrophils (%) (Auto)  92 % (31-73)   


 


Lymphocytes (%) (Auto)  5 % (24-48)   


 


Monocytes (%) (Auto)  3 % (0-9)   


 


Eosinophils (%) (Auto)  0 % (0-3)   


 


Basophils (%) (Auto)  0 % (0-3)   


 


Neutrophils # (Auto)


 


 13.3 x10^3/uL


(1.8-7.7) 


 





 


Lymphocytes # (Auto)


 


 0.8 x10^3/uL


(1.0-4.8) 


 





 


Monocytes # (Auto)


 


 0.4 x10^3/uL


(0.0-1.1) 


 





 


Eosinophils # (Auto)


 


 0.0 x10^3/uL


(0.0-0.7) 


 





 


Basophils # (Auto)


 


 0.0 x10^3/uL


(0.0-0.2) 


 





 


Segmented Neutrophils %  89 % (35-66)   


 


Band Neutrophils %  2 % (0-9)   


 


Lymphocytes %  7 % (24-48)   


 


Monocytes %  2 % (0-10)   


 


Platelet Estimate


 


 Adequate


(ADEQUATE) 


 





 


Sodium Level


 


 134 mmol/L


(136-145) 


 





 


Potassium Level


 


 4.2 mmol/L


(3.5-5.1) 


 





 


Chloride Level


 


 97 mmol/L


() 


 





 


Carbon Dioxide Level


 


 24 mmol/L


(21-32) 


 





 


Anion Gap  13 (6-14)   


 


Blood Urea Nitrogen


 


 32 mg/dL


(8-26) 


 





 


Creatinine


 


 1.6 mg/dL


(0.7-1.3) 


 





 


Estimated GFR


(Cockcroft-Gault) 


 45.1 


 


 





 


Glucose Level


 


 375 mg/dL


(70-99) 


 





 


Calcium Level


 


 8.9 mg/dL


(8.5-10.1) 


 





 


Creatine Kinase


 


 58 U/L


() 


 





 


Glucose (Fingerstick)


 


 


 256 mg/dL


(70-99) 221 mg/dL


(70-99)


 


Test


 11/18/21


20:58 11/19/21


07:23 11/19/21


12:03 11/19/21


12:35


 


Glucose (Fingerstick)


 403 mg/dL


(70-99) 328 mg/dL


(70-99) 387 mg/dL


(70-99) 





 


White Blood Count


 


 


 


 14.0 x10^3/uL


(4.0-11.0)


 


Red Blood Count


 


 


 


 5.61 x10^6/uL


(4.30-5.70)


 


Hemoglobin


 


 


 


 16.1 g/dL


(13.0-17.5)


 


Hematocrit


 


 


 


 48.8 %


(39.0-53.0)


 


Mean Corpuscular Volume    87 fL () 


 


Mean Corpuscular Hemoglobin    29 pg (25-35) 


 


Mean Corpuscular Hemoglobin


Concent 


 


 


 33 g/dL


(31-37)


 


Red Cell Distribution Width


 


 


 


 14.8 %


(11.5-14.5)


 


Platelet Count


 


 


 


 289 x10^3/uL


(140-400)


 


Neutrophils (%) (Auto)    91 % (31-73) 


 


Lymphocytes (%) (Auto)    5 % (24-48) 


 


Monocytes (%) (Auto)    4 % (0-9) 


 


Eosinophils (%) (Auto)    0 % (0-3) 


 


Basophils (%) (Auto)    0 % (0-3) 


 


Neutrophils # (Auto)


 


 


 


 12.8 x10^3/uL


(1.8-7.7)


 


Lymphocytes # (Auto)


 


 


 


 0.7 x10^3/uL


(1.0-4.8)


 


Monocytes # (Auto)


 


 


 


 0.5 x10^3/uL


(0.0-1.1)


 


Eosinophils # (Auto)


 


 


 


 0.0 x10^3/uL


(0.0-0.7)


 


Basophils # (Auto)


 


 


 


 0.0 x10^3/uL


(0.0-0.2)


 


Sodium Level


 


 


 


 134 mmol/L


(136-145)


 


Potassium Level


 


 


 


 4.6 mmol/L


(3.5-5.1)


 


Chloride Level


 


 


 


 96 mmol/L


()


 


Carbon Dioxide Level


 


 


 


 26 mmol/L


(21-32)


 


Anion Gap    12 (6-14) 


 


Blood Urea Nitrogen


 


 


 


 32 mg/dL


(8-26)


 


Creatinine


 


 


 


 1.7 mg/dL


(0.7-1.3)


 


Estimated GFR


(Cockcroft-Gault) 


 


 


 42.1 





 


Glucose Level


 


 


 


 374 mg/dL


(70-99)


 


Calcium Level


 


 


 


 9.2 mg/dL


(8.5-10.1)








Laboratory Tests








Test


 11/18/21


16:32 11/18/21


20:58 11/19/21


07:23 11/19/21


12:03


 


Glucose (Fingerstick)


 221 mg/dL


(70-99) 403 mg/dL


(70-99) 328 mg/dL


(70-99) 387 mg/dL


(70-99)


 


Test


 11/19/21


12:35 


 


 





 


White Blood Count


 14.0 x10^3/uL


(4.0-11.0) 


 


 





 


Red Blood Count


 5.61 x10^6/uL


(4.30-5.70) 


 


 





 


Hemoglobin


 16.1 g/dL


(13.0-17.5) 


 


 





 


Hematocrit


 48.8 %


(39.0-53.0) 


 


 





 


Mean Corpuscular Volume 87 fL ()    


 


Mean Corpuscular Hemoglobin 29 pg (25-35)    


 


Mean Corpuscular Hemoglobin


Concent 33 g/dL


(31-37) 


 


 





 


Red Cell Distribution Width


 14.8 %


(11.5-14.5) 


 


 





 


Platelet Count


 289 x10^3/uL


(140-400) 


 


 





 


Neutrophils (%) (Auto) 91 % (31-73)    


 


Lymphocytes (%) (Auto) 5 % (24-48)    


 


Monocytes (%) (Auto) 4 % (0-9)    


 


Eosinophils (%) (Auto) 0 % (0-3)    


 


Basophils (%) (Auto) 0 % (0-3)    


 


Neutrophils # (Auto)


 12.8 x10^3/uL


(1.8-7.7) 


 


 





 


Lymphocytes # (Auto)


 0.7 x10^3/uL


(1.0-4.8) 


 


 





 


Monocytes # (Auto)


 0.5 x10^3/uL


(0.0-1.1) 


 


 





 


Eosinophils # (Auto)


 0.0 x10^3/uL


(0.0-0.7) 


 


 





 


Basophils # (Auto)


 0.0 x10^3/uL


(0.0-0.2) 


 


 





 


Sodium Level


 134 mmol/L


(136-145) 


 


 





 


Potassium Level


 4.6 mmol/L


(3.5-5.1) 


 


 





 


Chloride Level


 96 mmol/L


() 


 


 





 


Carbon Dioxide Level


 26 mmol/L


(21-32) 


 


 





 


Anion Gap 12 (6-14)    


 


Blood Urea Nitrogen


 32 mg/dL


(8-26) 


 


 





 


Creatinine


 1.7 mg/dL


(0.7-1.3) 


 


 





 


Estimated GFR


(Cockcroft-Gault) 42.1 


 


 


 





 


Glucose Level


 374 mg/dL


(70-99) 


 


 





 


Calcium Level


 9.2 mg/dL


(8.5-10.1) 


 


 











Microbiology


11/16/21 Gram Stain - Final, Resulted


           


11/16/21 Aerobic and Anaerobic Culture - Preliminary, Resulted


           


11/16/21 Antimicrobic Susceptibility - Preliminary, Resulted


Medications





Current Medications


Fentanyl Citrate (Fentanyl 2ml Vial) 50 mcg PRN Q2HR  PRN IVP PAIN Last 

administered on 11/17/21at 02:11;  Start 11/16/21 at 16:00;  Stop 11/17/21 at 

03:01;  Status DC


Aspirin (Aspirin Chewable) 81 mg DAILY PO  Last administered on 11/19/21at 08

:02;  Start 11/17/21 at 09:00


Clonidine HCl (Catapres Tts-2) 1 patch WEEKLY TD  Last administered on 

11/16/21at 16:26;  Start 11/16/21 at 17:00


Docusate Sodium (Colace) 100 mg BID PO  Last administered on 11/18/21at 20:43;  

Start 11/16/21 at 21:00


EZETIMIBE (Zetia) 10 mg DAILY PO  Last administered on 11/19/21at 08:01;  Start 

11/17/21 at 09:00


Hydralazine HCl (Apresoline) 50 mg DAILY PO  Last administered on 11/18/21at 

08:41;  Start 11/17/21 at 09:00;  Stop 11/18/21 at 14:47;  Status DC


Hydrochlorothiazide (Microzide) 12.5 mg DAILY PO  Last administered on 

11/19/21at 08:01;  Start 11/17/21 at 09:00


Acetaminophen/ Hydrocodone Bitart (Lortab 7.5/325) 2 tab PRN Q4HRS  PRN PO PAIN 

Last administered on 11/17/21at 10:25;  Start 11/16/21 at 16:00;  Stop 11/17/21 

at 13:34;  Status DC


Lisinopril (Prinivil) 20 mg DAILY PO ;  Start 11/17/21 at 09:00;  Stop 11/17/21 

at 06:31;  Status DC


Methocarbamol (Robaxin) 750 mg TID PRN  PRN PO MUSCLE SPASMS Last administered 

on 11/19/21at 10:10;  Start 11/16/21 at 16:00


Atorvastatin Calcium (Lipitor) 80 mg DAILY PO  Last administered on 11/19/21at 

08:01;  Start 11/17/21 at 09:00


Non-Formulary Medication (Empagliflozin (Jardiance)) 25 mg DAILY PO ;  Start 

11/17/21 at 09:00;  Stop 11/17/21 at 18:17;  Status DC


Febuxostat (Uloric) 80 mg DAILY PO  Last administered on 11/19/21at 08:02;  

Start 11/17/21 at 09:00


Non-Formulary Medication (Icosapent Ethyl (Vascepa)) 1 gm BID PO ;  Start 

11/16/21 at 21:00;  Stop 11/17/21 at 18:18;  Status DC


Metoprolol Tartrate (Lopressor) 50 mg BID PO  Last administered on 11/19/21at 

08:03;  Start 11/16/21 at 21:00


Probenecid (Benemid) 500 mg BID PO  Last administered on 11/19/21at 08:02;  

Start 11/16/21 at 21:00


Non-Formulary Medication (Semaglutide (Ozempic)) 1 mg WEEKLY SQ ;  Start 

11/23/21 at 09:00;  Status UNV


Zolpidem Tartrate (Ambien) 5 mg PRN QHS  PRN PO INSOMNIA, MAY REPEAT X1 Last 

administered on 11/17/21at 21:00;  Start 11/16/21 at 16:15


Insulin Human Lispro (HumaLOG) 0-9 UNITS TIDWMEALS SQ  Last administered on 

11/19/21at 12:34;  Start 11/16/21 at 00:00


Dextrose (Dextrose 50%-Water Syringe) 12.5 gm PRN Q15MIN  PRN IV SEE COMMENTS;  

Start 11/16/21 at 20:45


Diphenhydramine HCl (Benadryl) 50 mg PRN Q6HRS  PRN IVP ITCHING, 1ST CHOICE Last

administered on 11/17/21at 18:42;  Start 11/17/21 at 03:00


Diphenhydramine HCl (Benadryl) 100 mg PRN Q6HRS  PRN IVP ITCHING, 2ND CHOICE;  

Start 11/17/21 at 03:00


Morphine Sulfate (Morphine Sulfate) 2 mg PRN Q2HR  PRN IVP PAIN Last 

administered on 11/19/21at 12:23;  Start 11/17/21 at 03:00


Famotidine (Pepcid Vial) 20 mg BID IVP  Last administered on 11/19/21at 08:02;  

Start 11/17/21 at 07:00


Methylprednisolone Sodium Succinate (SOLU-Medrol 40MG VIAL) 40 mg Q12HR IV  Last

administered on 11/19/21at 08:02;  Start 11/17/21 at 07:00;  Stop 11/19/21 at 

09:43;  Status DC


Daptomycin 640 mg/ Sodium Chloride 50 ml @  100 mls/hr Q24H IV  Last 

administered on 11/17/21at 11:01;  Start 11/17/21 at 10:00;  Stop 11/17/21 at 

14:59;  Status DC


Piperacillin Sod/ Tazobactam Sod 3.375 gm/Sodium Chloride 50 ml @  100 mls/hr 

Q6HRS IV  Last administered on 11/19/21at 06:16;  Start 11/17/21 at 10:00;  Stop

11/19/21 at 08:23;  Status DC


Lactobacillus Rhamnosus (Culturelle) 1 cap BID PO  Last administered on 

11/19/21at 08:01;  Start 11/17/21 at 21:00


Oxycodone/ Acetaminophen (Percocet 5/325) 1 tab PRN Q4HRS  PRN PO MODERATE PAIN;

 Start 11/17/21 at 13:45


Oxycodone/ Acetaminophen (Percocet 5/325) 2 tab PRN Q4HRS  PRN PO SEVERE PAIN 

Last administered on 11/19/21at 08:01;  Start 11/17/21 at 13:45


Daptomycin 500 mg/ Sodium Chloride 50 ml @  100 mls/hr Q24H IV  Last 

administered on 11/19/21at 10:09;  Start 11/18/21 at 09:00


Labetalol HCl (Normodyne Iv Push) 10 mg PRN Q15MIN  PRN IVP HYPERTENSION Last 

administered on 11/19/21at 08:04;  Start 11/18/21 at 00:00


Ziprasidone (Geodon) 20 mg PRN Q2HRS  PRN PO ANXIETY / AGITATION Last 

administered on 11/19/21at 10:10;  Start 11/18/21 at 00:00


Hydralazine HCl (Apresoline) 25 mg TID PO  Last administered on 11/19/21at 

08:01;  Start 11/18/21 at 00:30;  Stop 11/19/21 at 09:37;  Status DC


Insulin Human Lispro (HumaLOG) 5 units 1X  ONCE SQ  Last administered on 

11/18/21at 00:19;  Start 11/18/21 at 00:30;  Stop 11/18/21 at 00:31;  Status DC


Ziprasidone (Geodon Im) 10 mg PRN Q2HRS  PRN IM AGITATION Last administered on 

11/18/21at 06:05;  Start 11/18/21 at 00:15


Lidocaine HCl (Buffered Lidocaine 1%) 3 ml STK-MED ONCE .ROUTE ;  Start 11/18/21

at 12:11;  Stop 11/18/21 at 12:11;  Status DC


Lidocaine HCl (Buffered Lidocaine 1%) 3 ml STK-MED ONCE .ROUTE ;  Start 11/18/21

at 12:21;  Stop 11/18/21 at 12:22;  Status DC


Lidocaine HCl (Buffered Lidocaine 1%) 6 ml 1X  ONCE INJ  Last administered on 

11/18/21at 13:10;  Start 11/18/21 at 12:30;  Stop 11/18/21 at 12:34;  Status DC


Gadoterate Meglumine (Clariscan) 20 ml 1X  ONCE IVP  Last administered on 

11/18/21at 15:55;  Start 11/18/21 at 15:30;  Stop 11/18/21 at 15:31;  Status DC


Insulin Human Lispro (HumaLOG) 12 units 1X  ONCE SQ  Last administered on 

11/18/21at 22:05;  Start 11/18/21 at 22:00;  Stop 11/18/21 at 22:01;  Status DC


Hydralazine HCl (Apresoline) 50 mg TID PO ;  Start 11/19/21 at 14:00


Insulin Glargine (Lantus Syringe) 10 unit BID SQ  Last administered on 

11/19/21at 10:15;  Start 11/19/21 at 11:00


Prednisone (Prednisone) 40 mg DAILY PO ;  Start 11/20/21 at 09:00


Insulin Human Lispro (HumaLOG) 5 units TIDWMEALS SQ ;  Start 11/19/21 at 17:00





Active Scripts


Active


Colace (Docusate Sodium) 100 Mg Capsule 100 Mg PO BID 60 Days


Methocarbamol 750 Mg Tablet 750 Mg PO TID PRN PRN


Reported


[V-Go 40 Day Kit ]   Units SQ Q1HR


     5 CLICKS @ MEALS


     2 CLICKS @ SNACKS


Freestyle Lite Test Strip (Blood Sugar Diagnostic) 1 Each Strip 1 Strip MC DAILY

30 Days


Hydrocodone-Apap 7.5-325  ** (Hydrocodone Bit/Acetaminophen) 1 Tab Tablet 1-2 

Tab PO PRN Q4HRS PRN


Vascepa (Icosapent Ethyl) 1 Gm Capsule 1 Gm PO BID


Zolpidem Tartrate Er (Zolpidem Tartrate) 12.5 Mg Tab.mphase 12.5 Mg PO PRN QHS 

PRN


Probenecid 500 Mg Tablet 500 Mg PO BID


Hydrochlorothiazide Capsule  ** (Hydrochlorothiazide) 12.5 Mg Capsule 12.5 Mg PO

DAILY


Hydralazine Hcl 50 Mg Tablet 1 Tab PO DAILY


Uloric (Febuxostat) 80 Mg Tablet 1 Tab PO DAILY 30 Days


Clonidine Tts-2  ** (Clonidine) 1 Each Patch.tdwk 1 Patch TD WEEKLY


Bystolic (Nebivolol Hcl) 20 Mg Tablet 20 Mg PO DAILY


Aspirin 81 Mg Tab.chew 1 Tab PO DAILY


Zetia (Ezetimibe) 10 Mg Tablet 10 Mg PO DAILY


Zestril (Lisinopril) 20 Mg Tablet 1 Tab PO DAILY 30 Days


Ozempic (Semaglutide) 1 Mg/0.75 Ml Pen.injctr 1 Mg SQ WEEKLY


     SATURDAY


Lipitor (Atorvastatin Calcium) 80 Mg Tablet 1 Tab PO DAILY


Jardiance (Empagliflozin) 25 Mg Tablet 25 Mg PO DAILY


Vitals/I & O





Vital Sign - Last 24 Hours








 11/18/21 11/18/21 11/18/21 11/18/21





 14:20 14:56 14:59 15:00


 


Temp    97.7





    97.7


 


Pulse   86 96


 


Resp    20


 


B/P (MAP)   181/107 173/103 (126)


 


Pulse Ox    96


 


O2 Delivery Room Air Room Air  Room Air


 


    





    





 11/18/21 11/18/21 11/18/21 11/18/21





 17:07 17:40 18:48 19:15


 


Temp    97.9





    97.9


 


Pulse    96


 


Resp    20


 


B/P (MAP)    157/95 (115)


 


Pulse Ox    96


 


O2 Delivery Room Air Room Air Room Air Room Air


 


    





    





 11/18/21 11/18/21 11/18/21 11/18/21





 19:18 19:35 20:02 20:32


 


Resp 20  20 20


 


O2 Delivery Room Air Room Air Room Air Room Air





 11/18/21 11/18/21 11/18/21 11/18/21





 20:41 20:42 22:02 22:32


 


Pulse 96 96  


 


Resp   20 20


 


B/P (MAP) 157/95 157/95  


 


O2 Delivery   Room Air Room Air





 11/18/21 11/18/21 11/18/21 11/19/21





 22:54 23:08 23:38 00:01


 


Temp 98.5   





 98.5   


 


Pulse 92   


 


Resp 18 20 20 20


 


B/P (MAP) 167/94 (118)   


 


Pulse Ox 94   


 


O2 Delivery Room Air Room Air Room Air Room Air


 


    





    





 11/19/21 11/19/21 11/19/21 11/19/21





 00:31 02:01 02:31 02:56


 


Temp    97.7





    97.7


 


Pulse    89


 


Resp 20 20 20 18


 


B/P (MAP)    189/117 (141)


 


Pulse Ox    95


 


O2 Delivery Room Air Room Air Room Air Room Air


 


    





    





 11/19/21 11/19/21 11/19/21 11/19/21





 03:06 03:07 03:36 04:13


 


Pulse  89  


 


Resp 20  20 20


 


B/P (MAP)  189/117  


 


O2 Delivery Room Air  Room Air Room Air





 11/19/21 11/19/21 11/19/21 11/19/21





 04:43 06:17 06:47 07:35


 


Temp    98.5





    98.5


 


Resp 20 20 20 18


 


B/P (MAP)    191/121 (144)


 


Pulse Ox    99


 


O2 Delivery Room Air Room Air Room Air Room Air


 


    





    





 11/19/21 11/19/21 11/19/21 11/19/21





 08:01 08:01 08:03 08:04


 


Pulse  89 89 89


 


B/P (MAP)  191/121 191/121 191/121


 


O2 Delivery Room Air   





 11/19/21 11/19/21 11/19/21 11/19/21





 08:15 08:31 08:45 10:10


 


O2 Delivery Room Air Room Air Room Air Room Air





 11/19/21 11/19/21 11/19/21 11/19/21





 10:40 11:00 12:23 12:53


 


Temp  98.2  





  98.2  


 


Pulse  96  


 


Resp  18  


 


B/P (MAP)  160/100 (120)  


 


Pulse Ox  98  


 


O2 Delivery Room Air Room Air Room Air Room Air














Intake and Output   


 


 11/18/21 11/18/21 11/19/21





 15:00 23:00 07:00


 


Intake Total  740 ml 360 ml


 


Output Total  600 ml 300 ml


 


Balance  140 ml 60 ml











Justifications for Admission


Other Justification














ALIZA CHRIS APRN          Nov 19, 2021 14:13

## 2021-11-19 NOTE — PDOC
[facility] PROGRESS NOTE


Date of Service


DOS:


DATE: 11/19/21 


TIME: 11:46





Chief Complaint


Chief Complaint


ACE inhibitor induced angioedema


Postoperative wound infection








Continue with IV Solu-Medrol twice daily then transition to prednisone taper on 

11/19/2021


Appreciate infectious disease recommendations for antibiotic management


Pending blood and wound cultures


Would avoid ACE inhibitor for now, possible ARB in the future.


Pending C4 levels and C1 esterase levels





History of Present Illness


History of Present Illness


56-year-old male who underwent lumbar hemilaminectomy of the L3-L4 levels on 

November 3, 2021.  He was actually discharged home the following day was doing 

well.  Unfortunately patient had a surgical site complication with infection he 

was started on Keflex and failed p.o. antibiotics over the weekend.  There was 

increasing pain and serous drainage from incision and he was brought in for 

further evaluation and IV antibiotics.  He was given fentanyl for pain and 

patient was on lisinopril chronically.  Unfortunately patient had angioedema 

most likely with his use of ACE inhibitors.  Infectious diseases consulted for 

antibiotic management.  Patient interviewed bedside and he was doing well.  He 

denies any throat swelling or wheezing or trouble breathing.  He does not have 

any periorbital edema or facial swelling.  Only upper portion of his lip is 

swollen and his tongue is fine.  He is speaking in full sentences without any 

use of his accessory muscles.  Denies fevers, confusion, syncope, chest pain, 

shortness of breath, abdominal pain, diarrhea or hematuria.





11/18/2021


Patient had an acute agitative and confusion episode last night.  Patient ripped

out the sharps container from the wall.  He appears to be close to his baseline 

today and appears lethargic but talking on the phone.  Sitting on the recliner 

and not agitated at this time.  No fevers somewhat tachycardic and no 

hypotensive episodes.  Swelling of his upper lip appears to be improved.  No 

redness or swelling.  Patient will likely benefit from a PICC line placement for

long-term IV antibiotics.  Will defer the timing and course to infectious 

disease.  Patient's chart, labs, images were reviewed and discussed with RN





11/19/2021


No acute events overnight.  Patient is ambulating through the hallways back pain

.  There is clear drainage on the dressings which will need to be switched out. 

Blood pressures have been uncontrolled with systolic blood pressures from 1 60-1

90.  He does have IV antihypertensive medications as needed.  I have increased 

his hydralazine to 50 mg 3 times daily.  His sugars have also been greater than 

300 and as high as 400.  I have added a long-acting Lantus 10 units twice daily 

and keeping him on regular insulin sliding scale.  Patient usually does have an 

insulin pump in which he had his friend bring.  However due to his staff aureus 

infection I would recommend him not to place the insulin pump at this time and 

only administer insulin as needed and do subcu injections.  I have also 

decreased his Solu-Medrol to a prednisone taper at 40 mg daily for now.





Vitals/I&O


Vitals/I&O:





                                   Vital Signs








  Date Time  Temp Pulse Resp B/P (MAP) Pulse Ox O2 Delivery O2 Flow Rate FiO2


 


11/19/21 11:00 98.2 96 18 160/100 (120) 98 Room Air  





 98.2       














                                    I & O   


 


 11/18/21 11/18/21 11/19/21





 15:00 23:00 07:00


 


Intake Total  740 ml 360 ml


 


Output Total  600 ml 300 ml


 


Balance  140 ml 60 ml











Physical Exam


Physical Exam:


GENERAL:  Alert, oriented gentleman, not in distress.


VITAL SIGNS:  Stable. 


HEENT:  Both pupils are round and reacting.  No conjunctival lesion. No lesion 


in the mouth.


NECK:  Supple. No JVP. No lymphadenopathy.


LUNGS:  Clear.


HEART:  S1, S2, regular.


ABDOMEN:  Soft, nontender. No organomegaly.


EXTREMITIES:  No edema, cyanosis.


SKIN:  The patient does have some hives from fentanyl and lip swelling.


SPINE: There is incision with drainage present.


NEUROLOGIC:  The patient is alert, awake and appropriate. No focal neurologic 


deficit.


General:  Alert, Oriented X3, Cooperative


Heart:  Regular rate


Lungs:  Clear


Abdomen:  Normal bowel sounds


Extremities:  No clubbing


Skin:  Other (dressing intact, recently changed per RN)





Labs


Labs:





Laboratory Tests








Test


 11/18/21


12:07 11/18/21


16:32 11/18/21


20:58 11/19/21


07:23


 


Glucose (Fingerstick)


 256 mg/dL


(70-99) 221 mg/dL


(70-99) 403 mg/dL


(70-99) 328 mg/dL


(70-99)











Comment


Review of Relevant


I have reviewed the following items susan (where applicable) has been applied.


Medications:





Current Medications








 Medications


  (Trade)  Dose


 Ordered  Sig/Faheem


 Route


 PRN Reason  Start Time


 Stop Time Status Last Admin


Dose Admin


 


 Lidocaine HCl


  (Buffered


 Lidocaine 1%)  6 ml  1X  ONCE


 INJ


   11/18/21 12:30


 11/18/21 12:34 DC 11/18/21 13:10





 


 Gadoterate


 Meglumine


  (Clariscan)  20 ml  1X  ONCE


 IVP


   11/18/21 15:30


 11/18/21 15:31 DC 11/18/21 15:55





 


 Insulin Human


 Lispro


  (HumaLOG)  12 units  1X  ONCE


 SQ


   11/18/21 22:00


 11/18/21 22:01 DC 11/18/21 22:05





 


 Insulin Glargine


  (Lantus Syringe)  10 unit  BID


 SQ


   11/19/21 11:00


    11/19/21 10:15














Justifications for Admission


Other Justification














TAMARA PRATHER MD                  Nov 19, 2021 11:48

## 2021-11-19 NOTE — PDOC
Infectious Disease Note


Subjective


Subjective


Complaining of severe back pain and medications not helping.  Also having very 

high blood pressure





ROS


ROS


No nausea vomiting diarrhea chest pain shortness of breath headache or fevers





Vital Sign


Vital Signs





Vital Signs








  Date Time  Temp Pulse Resp B/P (MAP) Pulse Ox O2 Delivery O2 Flow Rate FiO2


 


11/19/21 07:35 98.5  18 191/121 (144) 99 Room Air  





 98.5       


 


11/19/21 03:07  89      











Physical Exam


PHYSICAL EXAM


GENERAL:  Alert, oriented gentleman, not in distress.


VITAL SIGNS:  Stable. 


HEENT:  Both pupils are round and reacting.  No conjunctival lesion. No lesion 


in the mouth.


NECK:  Supple. No JVP. No lymphadenopathy.


LUNGS:  Clear.


HEART:  S1, S2, regular.


ABDOMEN:  Soft, nontender. No organomegaly.


EXTREMITIES:  No edema, cyanosis.


SKIN:  The patient does have some hives from fentanyl and lip swelling.


SPINE: There is incision with drainage present.


NEUROLOGIC:  The patient is alert, awake and appropriate. No focal neurologic 


deficit.





Labs


Lab





Laboratory Tests








Test


 11/18/21


09:00 11/18/21


09:35 11/18/21


12:07 11/18/21


16:32


 


Urine Opiates Screen Pos (NEG)    


 


Urine Methadone Screen Neg (NEG)    


 


Urine Barbiturates Neg (NEG)    


 


Urine Phencyclidine Screen Neg (NEG)    


 


Urine


Amphetamine/Methamphetamine Neg (NEG) 


 


 


 





 


Urine Benzodiazepines Screen Neg (NEG)    


 


Urine Cocaine Screen Neg (NEG)    


 


Urine Cannabinoids Screen Neg (NEG)    


 


Urine Ethyl Alcohol Neg (NEG)    


 


White Blood Count


 


 14.5 x10^3/uL


(4.0-11.0) 


 





 


Red Blood Count


 


 5.23 x10^6/uL


(4.30-5.70) 


 





 


Hemoglobin


 


 15.3 g/dL


(13.0-17.5) 


 





 


Hematocrit


 


 45.5 %


(39.0-53.0) 


 





 


Mean Corpuscular Volume  87 fL ()   


 


Mean Corpuscular Hemoglobin  29 pg (25-35)   


 


Mean Corpuscular Hemoglobin


Concent 


 34 g/dL


(31-37) 


 





 


Red Cell Distribution Width


 


 14.6 %


(11.5-14.5) 


 





 


Platelet Count


 


 215 x10^3/uL


(140-400) 


 





 


Neutrophils (%) (Auto)  92 % (31-73)   


 


Lymphocytes (%) (Auto)  5 % (24-48)   


 


Monocytes (%) (Auto)  3 % (0-9)   


 


Eosinophils (%) (Auto)  0 % (0-3)   


 


Basophils (%) (Auto)  0 % (0-3)   


 


Neutrophils # (Auto)


 


 13.3 x10^3/uL


(1.8-7.7) 


 





 


Lymphocytes # (Auto)


 


 0.8 x10^3/uL


(1.0-4.8) 


 





 


Monocytes # (Auto)


 


 0.4 x10^3/uL


(0.0-1.1) 


 





 


Eosinophils # (Auto)


 


 0.0 x10^3/uL


(0.0-0.7) 


 





 


Basophils # (Auto)


 


 0.0 x10^3/uL


(0.0-0.2) 


 





 


Segmented Neutrophils %  89 % (35-66)   


 


Band Neutrophils %  2 % (0-9)   


 


Lymphocytes %  7 % (24-48)   


 


Monocytes %  2 % (0-10)   


 


Platelet Estimate


 


 Adequate


(ADEQUATE) 


 





 


Sodium Level


 


 134 mmol/L


(136-145) 


 





 


Potassium Level


 


 4.2 mmol/L


(3.5-5.1) 


 





 


Chloride Level


 


 97 mmol/L


() 


 





 


Carbon Dioxide Level


 


 24 mmol/L


(21-32) 


 





 


Anion Gap  13 (6-14)   


 


Blood Urea Nitrogen


 


 32 mg/dL


(8-26) 


 





 


Creatinine


 


 1.6 mg/dL


(0.7-1.3) 


 





 


Estimated GFR


(Cockcroft-Gault) 


 45.1 


 


 





 


Glucose Level


 


 375 mg/dL


(70-99) 


 





 


Calcium Level


 


 8.9 mg/dL


(8.5-10.1) 


 





 


Creatine Kinase


 


 58 U/L


() 


 





 


Glucose (Fingerstick)


 


 


 256 mg/dL


(70-99) 221 mg/dL


(70-99)


 


Test


 11/18/21


20:58 11/19/21


07:23 


 





 


Glucose (Fingerstick)


 403 mg/dL


(70-99) 328 mg/dL


(70-99) 


 











Micro





  GRAM STAIN  Final  


        Final





        NO ORGANISMS SEEN.


        SQUAMOUS EPI CELL:RARE


        PMN (WBCs):RARE


        Unless otherwise specified, Testing Performed by:


        [facility]


        [address] [place] [address]


        For Inquires, the Physician may contact the Microbiology


        department at [phone]





  ANAEROBIC-AEROBIC CULTURE  Preliminary  


        Preliminary





        FEW GRAM POSITIVE COCCI on 11/18/21 at 0822


        FINAL ID= [STAPHYLOCOCCUS [id number]]


        STAPHYLOCOCCUS AUREUS


        Unless otherwise specified, Testing Performed by:


        [address] [address]


        For Inquires, the Physician may contact the Microbiology


        department at [phone]





----------------------------------------------------





Objective


Assessment


IMPRESSION:


1.  Postsurgical spine infection.


2.  Status post right-sided hemilaminotomy and microdiscectomy L3-L4 done on 


11/03/2021.


3.  Diabetes mellitus.


4.  Hypertension.


5.  Leukocytosis.


6.  Renal insufficiency.





Plan


Plan of Care


Continue supportive care blood pressure management and pain control per 

hospitalist and neurosurgery


Continue daptomycin discontinue Zosyn


Susceptibility of staph aureus is pending











LIVIER PRITCHETT MD               Nov 19, 2021 07:52

## 2021-11-19 NOTE — NUR
Pt blood sugar 390, Dr Hubbard called and verbally ordered 20 units total of humalog SQ with 
dinner. Orders put in per telephone.

## 2021-11-20 VITALS — DIASTOLIC BLOOD PRESSURE: 119 MMHG | SYSTOLIC BLOOD PRESSURE: 179 MMHG

## 2021-11-20 VITALS — SYSTOLIC BLOOD PRESSURE: 166 MMHG | DIASTOLIC BLOOD PRESSURE: 115 MMHG

## 2021-11-20 VITALS — SYSTOLIC BLOOD PRESSURE: 187 MMHG | DIASTOLIC BLOOD PRESSURE: 124 MMHG

## 2021-11-20 VITALS — DIASTOLIC BLOOD PRESSURE: 90 MMHG | SYSTOLIC BLOOD PRESSURE: 143 MMHG

## 2021-11-20 VITALS — DIASTOLIC BLOOD PRESSURE: 108 MMHG | SYSTOLIC BLOOD PRESSURE: 145 MMHG

## 2021-11-20 LAB
ANION GAP SERPL CALC-SCNC: 9 MMOL/L (ref 6–14)
BASOPHILS # BLD AUTO: 0.1 X10^3/UL (ref 0–0.2)
BASOPHILS NFR BLD: 1 % (ref 0–3)
BUN SERPL-MCNC: 30 MG/DL (ref 8–26)
CALCIUM SERPL-MCNC: 9.2 MG/DL (ref 8.5–10.1)
CHLORIDE SERPL-SCNC: 96 MMOL/L (ref 98–107)
CO2 SERPL-SCNC: 32 MMOL/L (ref 21–32)
CREAT SERPL-MCNC: 1.4 MG/DL (ref 0.7–1.3)
EOSINOPHIL NFR BLD: 0 % (ref 0–3)
EOSINOPHIL NFR BLD: 0 X10^3/UL (ref 0–0.7)
ERYTHROCYTE [DISTWIDTH] IN BLOOD BY AUTOMATED COUNT: 14.7 % (ref 11.5–14.5)
GFR SERPLBLD BASED ON 1.73 SQ M-ARVRAT: 52.6 ML/MIN
GLUCOSE SERPL-MCNC: 283 MG/DL (ref 70–99)
HCT VFR BLD CALC: 52.1 % (ref 39–53)
HGB BLD-MCNC: 17.5 G/DL (ref 13–17.5)
LYMPHOCYTES # BLD: 1.5 X10^3/UL (ref 1–4.8)
LYMPHOCYTES NFR BLD AUTO: 14 % (ref 24–48)
MCH RBC QN AUTO: 30 PG (ref 25–35)
MCHC RBC AUTO-ENTMCNC: 34 G/DL (ref 31–37)
MCV RBC AUTO: 88 FL (ref 79–100)
MONO #: 0.8 X10^3/UL (ref 0–1.1)
MONOCYTES NFR BLD: 8 % (ref 0–9)
NEUT #: 8.1 X10^3/UL (ref 1.8–7.7)
NEUTROPHILS NFR BLD AUTO: 77 % (ref 31–73)
PLATELET # BLD AUTO: 245 X10^3/UL (ref 140–400)
POTASSIUM SERPL-SCNC: 3.9 MMOL/L (ref 3.5–5.1)
RBC # BLD AUTO: 5.91 X10^6/UL (ref 4.3–5.7)
SODIUM SERPL-SCNC: 137 MMOL/L (ref 136–145)
WBC # BLD AUTO: 10.4 X10^3/UL (ref 4–11)

## 2021-11-20 RX ADMIN — PROBENECID SCH MG: 500 TABLET, FILM COATED ORAL at 21:51

## 2021-11-20 RX ADMIN — POLYETHYLENE GLYCOL 3350 PRN GM: 17 POWDER, FOR SOLUTION ORAL at 12:12

## 2021-11-20 RX ADMIN — ASPIRIN 81 MG SCH MG: 81 TABLET ORAL at 08:32

## 2021-11-20 RX ADMIN — FAMOTIDINE SCH MG: 10 INJECTION, SOLUTION INTRAVENOUS at 21:49

## 2021-11-20 RX ADMIN — FEBUXOSTAT SCH MG: 40 TABLET ORAL at 08:31

## 2021-11-20 RX ADMIN — MORPHINE SULFATE PRN MG: 2 INJECTION, SOLUTION INTRAMUSCULAR; INTRAVENOUS at 08:30

## 2021-11-20 RX ADMIN — DOCUSATE SODIUM SCH MG: 100 CAPSULE, LIQUID FILLED ORAL at 08:30

## 2021-11-20 RX ADMIN — INSULIN LISPRO SCH UNITS: 100 INJECTION, SOLUTION INTRAVENOUS; SUBCUTANEOUS at 12:00

## 2021-11-20 RX ADMIN — PROBENECID SCH MG: 500 TABLET, FILM COATED ORAL at 08:32

## 2021-11-20 RX ADMIN — METOPROLOL TARTRATE SCH MG: 50 TABLET, FILM COATED ORAL at 21:50

## 2021-11-20 RX ADMIN — INSULIN GLARGINE SCH UNIT: 100 INJECTION, SOLUTION SUBCUTANEOUS at 09:09

## 2021-11-20 RX ADMIN — INSULIN GLARGINE SCH UNIT: 100 INJECTION, SOLUTION SUBCUTANEOUS at 23:06

## 2021-11-20 RX ADMIN — OXYCODONE HYDROCHLORIDE AND ACETAMINOPHEN PRN TAB: 5; 325 TABLET ORAL at 21:50

## 2021-11-20 RX ADMIN — INSULIN LISPRO SCH UNITS: 100 INJECTION, SOLUTION INTRAVENOUS; SUBCUTANEOUS at 17:00

## 2021-11-20 RX ADMIN — METOPROLOL TARTRATE SCH MG: 50 TABLET, FILM COATED ORAL at 08:32

## 2021-11-20 RX ADMIN — ATORVASTATIN CALCIUM SCH MG: 40 TABLET, FILM COATED ORAL at 08:31

## 2021-11-20 RX ADMIN — EZETIMIBE SCH MG: 10 TABLET ORAL at 08:31

## 2021-11-20 RX ADMIN — OXYCODONE HYDROCHLORIDE AND ACETAMINOPHEN PRN TAB: 5; 325 TABLET ORAL at 12:50

## 2021-11-20 RX ADMIN — DOCUSATE SODIUM SCH MG: 100 CAPSULE, LIQUID FILLED ORAL at 21:48

## 2021-11-20 RX ADMIN — FAMOTIDINE SCH MG: 10 INJECTION, SOLUTION INTRAVENOUS at 08:30

## 2021-11-20 RX ADMIN — OXYCODONE HYDROCHLORIDE AND ACETAMINOPHEN PRN TAB: 5; 325 TABLET ORAL at 17:24

## 2021-11-20 RX ADMIN — Medication SCH CAP: at 08:31

## 2021-11-20 RX ADMIN — MORPHINE SULFATE PRN MG: 2 INJECTION, SOLUTION INTRAMUSCULAR; INTRAVENOUS at 15:57

## 2021-11-20 RX ADMIN — DAPTOMYCIN SCH MLS/HR: 500 INJECTION, POWDER, LYOPHILIZED, FOR SOLUTION INTRAVENOUS at 09:03

## 2021-11-20 RX ADMIN — INSULIN LISPRO SCH UNITS: 100 INJECTION, SOLUTION INTRAVENOUS; SUBCUTANEOUS at 08:22

## 2021-11-20 RX ADMIN — INSULIN LISPRO SCH UNITS: 100 INJECTION, SOLUTION INTRAVENOUS; SUBCUTANEOUS at 17:26

## 2021-11-20 RX ADMIN — OXYCODONE HYDROCHLORIDE AND ACETAMINOPHEN PRN TAB: 5; 325 TABLET ORAL at 06:09

## 2021-11-20 RX ADMIN — INSULIN LISPRO SCH UNITS: 100 INJECTION, SOLUTION INTRAVENOUS; SUBCUTANEOUS at 08:21

## 2021-11-20 RX ADMIN — Medication SCH CAP: at 21:50

## 2021-11-20 NOTE — PDOC
PROGRESS NOTES


Date of Service


DATE: 11/20/21 


TIME: 16:22





Subjective


Subjective


patient seen at 1240


c/o back pain , slightly improved


has been up ambulating





Objective


Objective





Vital Signs








  Date Time  Temp Pulse Resp B/P (MAP) Pulse Ox O2 Delivery O2 Flow Rate FiO2


 


11/20/21 15:00 98.1 94 16 145/108 (120) 94 Room Air  





 98.1       














Intake and Output 


 


 11/20/21





 07:00


 


 


 


# Voids 1











Physical Exam


General:  Alert, Oriented X3, Cooperative, Other (facial swelling improved)


MUSCULOSKELETAL:  Other (HANNA)


Neuro:  Other (normal strength in LE)


Skin:  Other (dressing with some ss drainge, flat, intact)





Plan


Plan of Care


wound care


IV antibiotics per ID


activity as tolerated


taper steroids





Comment


Review of Relevant


I have reviewed the following items susan (where applicable) has been applied.


Labs





Laboratory Tests








Test


 11/18/21


16:32 11/18/21


20:58 11/19/21


07:23 11/19/21


12:03


 


Glucose (Fingerstick)


 221 mg/dL


(70-99) 403 mg/dL


(70-99) 328 mg/dL


(70-99) 387 mg/dL


(70-99)


 


Test


 11/19/21


12:35 11/19/21


17:30 11/19/21


20:26 11/20/21


01:55


 


White Blood Count


 14.0 x10^3/uL


(4.0-11.0) 


 


 





 


Red Blood Count


 5.61 x10^6/uL


(4.30-5.70) 


 


 





 


Hemoglobin


 16.1 g/dL


(13.0-17.5) 


 


 





 


Hematocrit


 48.8 %


(39.0-53.0) 


 


 





 


Mean Corpuscular Volume 87 fL ()    


 


Mean Corpuscular Hemoglobin 29 pg (25-35)    


 


Mean Corpuscular Hemoglobin


Concent 33 g/dL


(31-37) 


 


 





 


Red Cell Distribution Width


 14.8 %


(11.5-14.5) 


 


 





 


Platelet Count


 289 x10^3/uL


(140-400) 


 


 





 


Neutrophils (%) (Auto) 91 % (31-73)    


 


Lymphocytes (%) (Auto) 5 % (24-48)    


 


Monocytes (%) (Auto) 4 % (0-9)    


 


Eosinophils (%) (Auto) 0 % (0-3)    


 


Basophils (%) (Auto) 0 % (0-3)    


 


Neutrophils # (Auto)


 12.8 x10^3/uL


(1.8-7.7) 


 


 





 


Lymphocytes # (Auto)


 0.7 x10^3/uL


(1.0-4.8) 


 


 





 


Monocytes # (Auto)


 0.5 x10^3/uL


(0.0-1.1) 


 


 





 


Eosinophils # (Auto)


 0.0 x10^3/uL


(0.0-0.7) 


 


 





 


Basophils # (Auto)


 0.0 x10^3/uL


(0.0-0.2) 


 


 





 


Sodium Level


 134 mmol/L


(136-145) 


 


 





 


Potassium Level


 4.6 mmol/L


(3.5-5.1) 


 


 





 


Chloride Level


 96 mmol/L


() 


 


 





 


Carbon Dioxide Level


 26 mmol/L


(21-32) 


 


 





 


Anion Gap 12 (6-14)    


 


Blood Urea Nitrogen


 32 mg/dL


(8-26) 


 


 





 


Creatinine


 1.7 mg/dL


(0.7-1.3) 


 


 





 


Estimated GFR


(Cockcroft-Gault) 42.1 


 


 


 





 


Glucose Level


 374 mg/dL


(70-99) 


 


 





 


Calcium Level


 9.2 mg/dL


(8.5-10.1) 


 


 





 


Glucose (Fingerstick)


 


 390 mg/dL


(70-99) 420 mg/dL


(70-99) 356 mg/dL


(70-99)


 


Test


 11/20/21


07:41 11/20/21


08:30 11/20/21


11:49 





 


Glucose (Fingerstick)


 305 mg/dL


(70-99) 


 273 mg/dL


(70-99) 





 


White Blood Count


 


 10.4 x10^3/uL


(4.0-11.0) 


 





 


Red Blood Count


 


 5.91 x10^6/uL


(4.30-5.70) 


 





 


Hemoglobin


 


 17.5 g/dL


(13.0-17.5) 


 





 


Hematocrit


 


 52.1 %


(39.0-53.0) 


 





 


Mean Corpuscular Volume  88 fL ()   


 


Mean Corpuscular Hemoglobin  30 pg (25-35)   


 


Mean Corpuscular Hemoglobin


Concent 


 34 g/dL


(31-37) 


 





 


Red Cell Distribution Width


 


 14.7 %


(11.5-14.5) 


 





 


Platelet Count


 


 245 x10^3/uL


(140-400) 


 





 


Neutrophils (%) (Auto)  77 % (31-73)   


 


Lymphocytes (%) (Auto)  14 % (24-48)   


 


Monocytes (%) (Auto)  8 % (0-9)   


 


Eosinophils (%) (Auto)  0 % (0-3)   


 


Basophils (%) (Auto)  1 % (0-3)   


 


Neutrophils # (Auto)


 


 8.1 x10^3/uL


(1.8-7.7) 


 





 


Lymphocytes # (Auto)


 


 1.5 x10^3/uL


(1.0-4.8) 


 





 


Monocytes # (Auto)


 


 0.8 x10^3/uL


(0.0-1.1) 


 





 


Eosinophils # (Auto)


 


 0.0 x10^3/uL


(0.0-0.7) 


 





 


Basophils # (Auto)


 


 0.1 x10^3/uL


(0.0-0.2) 


 





 


Sodium Level


 


 137 mmol/L


(136-145) 


 





 


Potassium Level


 


 3.9 mmol/L


(3.5-5.1) 


 





 


Chloride Level


 


 96 mmol/L


() 


 





 


Carbon Dioxide Level


 


 32 mmol/L


(21-32) 


 





 


Anion Gap  9 (6-14)   


 


Blood Urea Nitrogen


 


 30 mg/dL


(8-26) 


 





 


Creatinine


 


 1.4 mg/dL


(0.7-1.3) 


 





 


Estimated GFR


(Cockcroft-Gault) 


 52.6 


 


 





 


Glucose Level


 


 283 mg/dL


(70-99) 


 





 


Calcium Level


 


 9.2 mg/dL


(8.5-10.1) 


 











Laboratory Tests








Test


 11/19/21


17:30 11/19/21


20:26 11/20/21


01:55 11/20/21


07:41


 


Glucose (Fingerstick)


 390 mg/dL


(70-99) 420 mg/dL


(70-99) 356 mg/dL


(70-99) 305 mg/dL


(70-99)


 


Test


 11/20/21


08:30 11/20/21


11:49 


 





 


White Blood Count


 10.4 x10^3/uL


(4.0-11.0) 


 


 





 


Red Blood Count


 5.91 x10^6/uL


(4.30-5.70) 


 


 





 


Hemoglobin


 17.5 g/dL


(13.0-17.5) 


 


 





 


Hematocrit


 52.1 %


(39.0-53.0) 


 


 





 


Mean Corpuscular Volume 88 fL ()    


 


Mean Corpuscular Hemoglobin 30 pg (25-35)    


 


Mean Corpuscular Hemoglobin


Concent 34 g/dL


(31-37) 


 


 





 


Red Cell Distribution Width


 14.7 %


(11.5-14.5) 


 


 





 


Platelet Count


 245 x10^3/uL


(140-400) 


 


 





 


Neutrophils (%) (Auto) 77 % (31-73)    


 


Lymphocytes (%) (Auto) 14 % (24-48)    


 


Monocytes (%) (Auto) 8 % (0-9)    


 


Eosinophils (%) (Auto) 0 % (0-3)    


 


Basophils (%) (Auto) 1 % (0-3)    


 


Neutrophils # (Auto)


 8.1 x10^3/uL


(1.8-7.7) 


 


 





 


Lymphocytes # (Auto)


 1.5 x10^3/uL


(1.0-4.8) 


 


 





 


Monocytes # (Auto)


 0.8 x10^3/uL


(0.0-1.1) 


 


 





 


Eosinophils # (Auto)


 0.0 x10^3/uL


(0.0-0.7) 


 


 





 


Basophils # (Auto)


 0.1 x10^3/uL


(0.0-0.2) 


 


 





 


Sodium Level


 137 mmol/L


(136-145) 


 


 





 


Potassium Level


 3.9 mmol/L


(3.5-5.1) 


 


 





 


Chloride Level


 96 mmol/L


() 


 


 





 


Carbon Dioxide Level


 32 mmol/L


(21-32) 


 


 





 


Anion Gap 9 (6-14)    


 


Blood Urea Nitrogen


 30 mg/dL


(8-26) 


 


 





 


Creatinine


 1.4 mg/dL


(0.7-1.3) 


 


 





 


Estimated GFR


(Cockcroft-Gault) 52.6 


 


 


 





 


Glucose Level


 283 mg/dL


(70-99) 


 


 





 


Calcium Level


 9.2 mg/dL


(8.5-10.1) 


 


 





 


Glucose (Fingerstick)


 


 273 mg/dL


(70-99) 


 











Microbiology


11/16/21 Gram Stain - Final, Resulted


           


11/16/21 Aerobic and Anaerobic Culture - Preliminary, Resulted


           


11/16/21 Antimicrobic Susceptibility - Preliminary, Resulted


Medications





Current Medications


Fentanyl Citrate (Fentanyl 2ml Vial) 50 mcg PRN Q2HR  PRN IVP PAIN Last 

administered on 11/17/21at 02:11;  Start 11/16/21 at 16:00;  Stop 11/17/21 at 

03:01;  Status DC


Aspirin (Aspirin Chewable) 81 mg DAILY PO  Last administered on 11/20/21at 

08:32;  Start 11/17/21 at 09:00


Clonidine HCl (Catapres Tts-2) 1 patch WEEKLY TD  Last administered on 

11/16/21at 16:26;  Start 11/16/21 at 17:00


Docusate Sodium (Colace) 100 mg BID PO  Last administered on 11/20/21at 08:30;  

Start 11/16/21 at 21:00


EZETIMIBE (Zetia) 10 mg DAILY PO  Last administered on 11/20/21at 08:31;  Start 

11/17/21 at 09:00


Hydralazine HCl (Apresoline) 50 mg DAILY PO  Last administered on 11/18/21at 

08:41;  Start 11/17/21 at 09:00;  Stop 11/18/21 at 14:47;  Status DC


Hydrochlorothiazide (Microzide) 12.5 mg DAILY PO  Last administered on 

11/20/21at 08:31;  Start 11/17/21 at 09:00


Acetaminophen/ Hydrocodone Bitart (Lortab 7.5/325) 2 tab PRN Q4HRS  PRN PO PAIN 

Last administered on 11/17/21at 10:25;  Start 11/16/21 at 16:00;  Stop 11/17/21 

at 13:34;  Status DC


Lisinopril (Prinivil) 20 mg DAILY PO ;  Start 11/17/21 at 09:00;  Stop 11/17/21 

at 06:31;  Status DC


Methocarbamol (Robaxin) 750 mg TID PRN  PRN PO MUSCLE SPASMS Last administered 

on 11/19/21at 10:10;  Start 11/16/21 at 16:00


Atorvastatin Calcium (Lipitor) 80 mg DAILY PO  Last administered on 11/20/21at 

08:31;  Start 11/17/21 at 09:00


Non-Formulary Medication (Empagliflozin (Jardiance)) 25 mg DAILY PO ;  Start 

11/17/21 at 09:00;  Stop 11/17/21 at 18:17;  Status DC


Febuxostat (Uloric) 80 mg DAILY PO  Last administered on 11/20/21at 08:31;  

Start 11/17/21 at 09:00


Non-Formulary Medication (Icosapent Ethyl (Vascepa)) 1 gm BID PO ;  Start 

11/16/21 at 21:00;  Stop 11/17/21 at 18:18;  Status DC


Metoprolol Tartrate (Lopressor) 50 mg BID PO  Last administered on 11/20/21at 

08:32;  Start 11/16/21 at 21:00


Probenecid (Benemid) 500 mg BID PO  Last administered on 11/20/21at 08:32;  

Start 11/16/21 at 21:00


Non-Formulary Medication (Semaglutide (Ozempic)) 1 mg WEEKLY SQ ;  Start 

11/23/21 at 09:00;  Status UNV


Zolpidem Tartrate (Ambien) 5 mg PRN QHS  PRN PO INSOMNIA, MAY REPEAT X1 Last 

administered on 11/17/21at 21:00;  Start 11/16/21 at 16:15


Insulin Human Lispro (HumaLOG) 0-9 UNITS TIDWMEALS SQ  Last administered on 

11/20/21at 12:00;  Start 11/16/21 at 00:00


Dextrose (Dextrose 50%-Water Syringe) 12.5 gm PRN Q15MIN  PRN IV SEE COMMENTS;  

Start 11/16/21 at 20:45


Diphenhydramine HCl (Benadryl) 50 mg PRN Q6HRS  PRN IVP ITCHING, 1ST CHOICE Last

administered on 11/20/21at 12:13;  Start 11/17/21 at 03:00


Diphenhydramine HCl (Benadryl) 100 mg PRN Q6HRS  PRN IVP ITCHING, 2ND CHOICE;  

Start 11/17/21 at 03:00


Morphine Sulfate (Morphine Sulfate) 2 mg PRN Q2HR  PRN IVP PAIN Last admin

istered on 11/20/21at 15:57;  Start 11/17/21 at 03:00


Famotidine (Pepcid Vial) 20 mg BID IVP  Last administered on 11/20/21at 08:30;  

Start 11/17/21 at 07:00


Methylprednisolone Sodium Succinate (SOLU-Medrol 40MG VIAL) 40 mg Q12HR IV  Last

administered on 11/19/21at 08:02;  Start 11/17/21 at 07:00;  Stop 11/19/21 at 

09:43;  Status DC


Daptomycin 640 mg/ Sodium Chloride 50 ml @  100 mls/hr Q24H IV  Last 

administered on 11/17/21at 11:01;  Start 11/17/21 at 10:00;  Stop 11/17/21 at 

14:59;  Status DC


Piperacillin Sod/ Tazobactam Sod 3.375 gm/Sodium Chloride 50 ml @  100 mls/hr 

Q6HRS IV  Last administered on 11/19/21at 06:16;  Start 11/17/21 at 10:00;  Stop

11/19/21 at 08:23;  Status DC


Lactobacillus Rhamnosus (Culturelle) 1 cap BID PO  Last administered on 11/20/2

1at 08:31;  Start 11/17/21 at 21:00


Oxycodone/ Acetaminophen (Percocet 5/325) 1 tab PRN Q4HRS  PRN PO MODERATE PAIN;

 Start 11/17/21 at 13:45


Oxycodone/ Acetaminophen (Percocet 5/325) 2 tab PRN Q4HRS  PRN PO SEVERE PAIN 

Last administered on 11/20/21at 12:50;  Start 11/17/21 at 13:45


Daptomycin 500 mg/ Sodium Chloride 50 ml @  100 mls/hr Q24H IV  Last 

administered on 11/20/21at 09:03;  Start 11/18/21 at 09:00;  Stop 11/20/21 at 

12:51;  Status DC


Labetalol HCl (Normodyne Iv Push) 10 mg PRN Q15MIN  PRN IVP HYPERTENSION Last 

administered on 11/19/21at 08:04;  Start 11/18/21 at 00:00


Ziprasidone (Geodon) 20 mg PRN Q2HRS  PRN PO ANXIETY / AGITATION Last 

administered on 11/19/21at 10:10;  Start 11/18/21 at 00:00


Hydralazine HCl (Apresoline) 25 mg TID PO  Last administered on 11/19/21at 

08:01;  Start 11/18/21 at 00:30;  Stop 11/19/21 at 09:37;  Status DC


Insulin Human Lispro (HumaLOG) 5 units 1X  ONCE SQ  Last administered on 

11/18/21at 00:19;  Start 11/18/21 at 00:30;  Stop 11/18/21 at 00:31;  Status DC


Ziprasidone (Geodon Im) 10 mg PRN Q2HRS  PRN IM AGITATION Last administered on 

11/18/21at 06:05;  Start 11/18/21 at 00:15


Lidocaine HCl (Buffered Lidocaine 1%) 3 ml STK-MED ONCE .ROUTE ;  Start 11/18/21

at 12:11;  Stop 11/18/21 at 12:11;  Status DC


Lidocaine HCl (Buffered Lidocaine 1%) 3 ml STK-MED ONCE .ROUTE ;  Start 11/18/21

at 12:21;  Stop 11/18/21 at 12:22;  Status DC


Lidocaine HCl (Buffered Lidocaine 1%) 6 ml 1X  ONCE INJ  Last administered on 

11/18/21at 13:10;  Start 11/18/21 at 12:30;  Stop 11/18/21 at 12:34;  Status DC


Gadoterate Meglumine (Clariscan) 20 ml 1X  ONCE IVP  Last administered on 

11/18/21at 15:55;  Start 11/18/21 at 15:30;  Stop 11/18/21 at 15:31;  Status DC


Insulin Human Lispro (HumaLOG) 12 units 1X  ONCE SQ  Last administered on 

11/18/21at 22:05;  Start 11/18/21 at 22:00;  Stop 11/18/21 at 22:01;  Status DC


Hydralazine HCl (Apresoline) 50 mg TID PO  Last administered on 11/20/21at 

12:13;  Start 11/19/21 at 14:00


Insulin Glargine (Lantus Syringe) 10 unit BID SQ  Last administered on 

11/20/21at 09:09;  Start 11/19/21 at 11:00


Prednisone (Prednisone) 40 mg DAILY PO  Last administered on 11/20/21at 08:31;  

Start 11/20/21 at 09:00;  Stop 11/20/21 at 09:15;  Status DC


Insulin Human Lispro (HumaLOG) 5 units TIDWMEALS SQ  Last administered on 

11/20/21at 12:00;  Start 11/19/21 at 17:00


Polyethylene Glycol (miraLAX PACKET) 17 gm PRN Q4HRS  PRN PO CONSTIPATION Last 

administered on 11/20/21at 12:12;  Start 11/19/21 at 16:15


Insulin Human Lispro (HumaLOG) 20 units 1X  ONCE SQ  Last administered on 

11/19/21at 18:18;  Start 11/19/21 at 18:00;  Stop 11/19/21 at 18:03;  Status DC


Prednisone (Prednisone) 20 mg DAILY PO ;  Start 11/21/21 at 09:00


Amlodipine Besylate (Norvasc) 5 mg DAILY PO ;  Start 11/20/21 at 09:15


Cefazolin Sodium/ Dextrose 50 ml @  100 mls/hr Q8HRS IV  Last administered on 

11/20/21at 13:43;  Start 11/20/21 at 14:00


Methylprednisolone Sodium Succinate (SOLU-Medrol 125MG VIAL) 125 mg 1X  ONCE IV 

Last administered on 11/20/21at 14:59;  Start 11/20/21 at 14:30;  Stop 11/20/21 

at 14:31;  Status DC





Active Scripts


Active


Colace (Docusate Sodium) 100 Mg Capsule 100 Mg PO BID 60 Days


Methocarbamol 750 Mg Tablet 750 Mg PO TID PRN PRN


Reported


[V-Go 40 Day Kit ]   Units SQ Q1HR


     5 CLICKS @ MEALS


     2 CLICKS @ SNACKS


Freestyle Lite Test Strip (Blood Sugar Diagnostic) 1 Each Strip 1 Strip MC DAILY

30 Days


Hydrocodone-Apap 7.5-325  ** (Hydrocodone Bit/Acetaminophen) 1 Tab Tablet 1-2 

Tab PO PRN Q4HRS PRN


Vascepa (Icosapent Ethyl) 1 Gm Capsule 1 Gm PO BID


Zolpidem Tartrate Er (Zolpidem Tartrate) 12.5 Mg Tab.mphase 12.5 Mg PO PRN QHS 

PRN


Probenecid 500 Mg Tablet 500 Mg PO BID


Hydrochlorothiazide Capsule  ** (Hydrochlorothiazide) 12.5 Mg Capsule 12.5 Mg PO

DAILY


Hydralazine Hcl 50 Mg Tablet 1 Tab PO DAILY


Uloric (Febuxostat) 80 Mg Tablet 1 Tab PO DAILY 30 Days


Clonidine Tts-2  ** (Clonidine) 1 Each Patch.tdwk 1 Patch TD WEEKLY


Bystolic (Nebivolol Hcl) 20 Mg Tablet 20 Mg PO DAILY


Aspirin 81 Mg Tab.chew 1 Tab PO DAILY


Zetia (Ezetimibe) 10 Mg Tablet 10 Mg PO DAILY


Zestril (Lisinopril) 20 Mg Tablet 1 Tab PO DAILY 30 Days


Ozempic (Semaglutide) 1 Mg/0.75 Ml Pen.injctr 1 Mg SQ WEEKLY


     SATURDAY


Lipitor (Atorvastatin Calcium) 80 Mg Tablet 1 Tab PO DAILY


Jardiance (Empagliflozin) 25 Mg Tablet 25 Mg PO DAILY


Vitals/I & O





Vital Sign - Last 24 Hours








 11/19/21 11/19/21 11/19/21 11/19/21





 18:34 19:00 19:04 19:56


 


Temp  97.4  





  97.4  


 


Pulse  94  


 


Resp  16  


 


B/P (MAP)  210/132 (158)  


 


Pulse Ox  98 98 98


 


O2 Delivery Room Air Room Air Room Air Room Air


 


    





    





 11/19/21 11/19/21 11/19/21 11/19/21





 20:00 20:09 20:09 20:26


 


Pulse  98 98 


 


B/P (MAP)  158/92 158/92 


 


Pulse Ox    97


 


O2 Delivery Room Air   Room Air





 11/19/21 11/19/21 11/19/21 11/20/21





 22:33 23:00 23:03 06:09


 


Temp  97.6  





  97.6  


 


Pulse  100  


 


Resp  16  


 


B/P (MAP)  169/97 (121)  


 


Pulse Ox 98 97 97 97


 


O2 Delivery Room Air Room Air Room Air Room Air


 


    





    





 11/20/21 11/20/21 11/20/21 11/20/21





 06:39 07:00 08:00 08:31


 


Temp  97.8  





  97.8  


 


Pulse  88  88


 


Resp  16  


 


B/P (MAP)  179/119 (139)  179/119


 


Pulse Ox 97 97  


 


O2 Delivery Room Air Room Air Room Air 


 


    





    





 11/20/21 11/20/21 11/20/21 11/20/21





 08:32 09:09 11:00 12:13


 


Temp   98.0 





   98.0 


 


Pulse 88  83 83


 


Resp   16 


 


B/P (MAP) 179/119  143/90 (107) 143/90


 


Pulse Ox  97 96 


 


O2 Delivery  Room Air Room Air 


 


    





    





 11/20/21   





 15:00   


 


Temp 98.1   





 98.1   


 


Pulse 94   


 


Resp 16   


 


B/P (MAP) 145/108 (120)   


 


Pulse Ox 94   


 


O2 Delivery Room Air   











Justifications for Admission


Other Justification














TUNDE COPE MD            Nov 20, 2021 16:25

## 2021-11-20 NOTE — PDOC
Infectious Disease Note


Subjective


Subjective


Continues to have significant back pain





ROS


ROS


No nausea vomiting diarrhea or fever





Vital Sign


Vital Signs





Vital Signs








  Date Time  Temp Pulse Resp B/P (MAP) Pulse Ox O2 Delivery O2 Flow Rate FiO2


 


11/20/21 11:00 98.0 83 16 143/90 (107) 96 Room Air  





 98.0       











Physical Exam


PHYSICAL EXAM


GENERAL:  Alert, oriented gentleman, not in distress.


VITAL SIGNS:  Stable. 


HEENT:  Both pupils are round and reacting.  No conjunctival lesion. No lesion 


in the mouth.


NECK:  Supple. No JVP. No lymphadenopathy.


LUNGS:  Clear.


HEART:  S1, S2, regular.


ABDOMEN:  Soft, nontender. No organomegaly.


EXTREMITIES:  No edema, cyanosis.


SKIN:  The patient does have some hives from fentanyl and lip swelling.


SPINE: There is incision with drainage present.


NEUROLOGIC:  The patient is alert, awake and appropriate. No focal neurologic 


deficit.





Labs


Lab





Laboratory Tests








Test


 11/19/21


12:03 11/19/21


12:35 11/19/21


17:30 11/19/21


20:26


 


Glucose (Fingerstick)


 387 mg/dL


(70-99) 


 390 mg/dL


(70-99) 420 mg/dL


(70-99)


 


White Blood Count


 


 14.0 x10^3/uL


(4.0-11.0) 


 





 


Red Blood Count


 


 5.61 x10^6/uL


(4.30-5.70) 


 





 


Hemoglobin


 


 16.1 g/dL


(13.0-17.5) 


 





 


Hematocrit


 


 48.8 %


(39.0-53.0) 


 





 


Mean Corpuscular Volume  87 fL ()   


 


Mean Corpuscular Hemoglobin  29 pg (25-35)   


 


Mean Corpuscular Hemoglobin


Concent 


 33 g/dL


(31-37) 


 





 


Red Cell Distribution Width


 


 14.8 %


(11.5-14.5) 


 





 


Platelet Count


 


 289 x10^3/uL


(140-400) 


 





 


Neutrophils (%) (Auto)  91 % (31-73)   


 


Lymphocytes (%) (Auto)  5 % (24-48)   


 


Monocytes (%) (Auto)  4 % (0-9)   


 


Eosinophils (%) (Auto)  0 % (0-3)   


 


Basophils (%) (Auto)  0 % (0-3)   


 


Neutrophils # (Auto)


 


 12.8 x10^3/uL


(1.8-7.7) 


 





 


Lymphocytes # (Auto)


 


 0.7 x10^3/uL


(1.0-4.8) 


 





 


Monocytes # (Auto)


 


 0.5 x10^3/uL


(0.0-1.1) 


 





 


Eosinophils # (Auto)


 


 0.0 x10^3/uL


(0.0-0.7) 


 





 


Basophils # (Auto)


 


 0.0 x10^3/uL


(0.0-0.2) 


 





 


Sodium Level


 


 134 mmol/L


(136-145) 


 





 


Potassium Level


 


 4.6 mmol/L


(3.5-5.1) 


 





 


Chloride Level


 


 96 mmol/L


() 


 





 


Carbon Dioxide Level


 


 26 mmol/L


(21-32) 


 





 


Anion Gap  12 (6-14)   


 


Blood Urea Nitrogen


 


 32 mg/dL


(8-26) 


 





 


Creatinine


 


 1.7 mg/dL


(0.7-1.3) 


 





 


Estimated GFR


(Cockcroft-Gault) 


 42.1 


 


 





 


Glucose Level


 


 374 mg/dL


(70-99) 


 





 


Calcium Level


 


 9.2 mg/dL


(8.5-10.1) 


 





 


Test


 11/20/21


01:55 11/20/21


07:41 11/20/21


08:30 





 


Glucose (Fingerstick)


 356 mg/dL


(70-99) 305 mg/dL


(70-99) 


 





 


White Blood Count


 


 


 10.4 x10^3/uL


(4.0-11.0) 





 


Red Blood Count


 


 


 5.91 x10^6/uL


(4.30-5.70) 





 


Hemoglobin


 


 


 17.5 g/dL


(13.0-17.5) 





 


Hematocrit


 


 


 52.1 %


(39.0-53.0) 





 


Mean Corpuscular Volume   88 fL ()  


 


Mean Corpuscular Hemoglobin   30 pg (25-35)  


 


Mean Corpuscular Hemoglobin


Concent 


 


 34 g/dL


(31-37) 





 


Red Cell Distribution Width


 


 


 14.7 %


(11.5-14.5) 





 


Platelet Count


 


 


 245 x10^3/uL


(140-400) 





 


Neutrophils (%) (Auto)   77 % (31-73)  


 


Lymphocytes (%) (Auto)   14 % (24-48)  


 


Monocytes (%) (Auto)   8 % (0-9)  


 


Eosinophils (%) (Auto)   0 % (0-3)  


 


Basophils (%) (Auto)   1 % (0-3)  


 


Neutrophils # (Auto)


 


 


 8.1 x10^3/uL


(1.8-7.7) 





 


Lymphocytes # (Auto)


 


 


 1.5 x10^3/uL


(1.0-4.8) 





 


Monocytes # (Auto)


 


 


 0.8 x10^3/uL


(0.0-1.1) 





 


Eosinophils # (Auto)


 


 


 0.0 x10^3/uL


(0.0-0.7) 





 


Basophils # (Auto)


 


 


 0.1 x10^3/uL


(0.0-0.2) 





 


Sodium Level


 


 


 137 mmol/L


(136-145) 





 


Potassium Level


 


 


 3.9 mmol/L


(3.5-5.1) 





 


Chloride Level


 


 


 96 mmol/L


() 





 


Carbon Dioxide Level


 


 


 32 mmol/L


(21-32) 





 


Anion Gap   9 (6-14)  


 


Blood Urea Nitrogen


 


 


 30 mg/dL


(8-26) 





 


Creatinine


 


 


 1.4 mg/dL


(0.7-1.3) 





 


Estimated GFR


(Cockcroft-Gault) 


 


 52.6 


 





 


Glucose Level


 


 


 283 mg/dL


(70-99) 





 


Calcium Level


 


 


 9.2 mg/dL


(8.5-10.1) 











Micro





  GRAM STAIN  Final  


        Final





        NO ORGANISMS SEEN.


        SQUAMOUS EPI CELL:RARE


        PMN (WBCs):[id number]


        Unless otherwise specified, Testing Performed by:


        [address] [address]


        For Inquires, the Physician may contact the Microbiology


        department at [phone]





  ANAEROBIC-AEROBIC CULTURE  Preliminary  


        Preliminary





        FEW GRAM POSITIVE COCCI on 11/18/21 at 0822


        FINAL ID= [STAPHYLOCOCCUS AUREUS]


        STAPHYLOCOCCUS AUREUS





  ANTIMICROBIAL SUSCEPTIBILITY  Preliminary  


        Comment





        POS TINA TYPE 38


        STAPHYLOCOCCUS AUREUS


        ANTIBIOTIC                        RESULT          INTERPRETATION


        AZITHROMYCIN                      >4                    R


        CLINDAMYCIN                       <=0.25                S


        CEFOXITIN SCREEN                  <=4                   NEG


        CIPROFLOXACIN                     <=1                   S


        CEFTAROLINE                       <=0.5                 S


        DAPTOMYCIN                        <=0.5                 S


        ERYTHROMYCIN                      >4                    R


        GENTAMICIN                        <=4                   S


        INDUCIBLE CLINDAMYCIN             <=4/0.5               NEG


        LINEZOLID                         2                     S


        LEVOFLOXACIN                      <=1                   S


        OXACILLIN                         <=0.25                S


        PENICILLIN                        >2                    Mal


        RIFAMPIN                          <=1                   S


        TRIMETHOPRIM/SULFAMETHOXAZOLE     <=0.5/9.5             S














-----------------------------------------------------------------------------

---------------





RUN DATE: 11/19/21                  [facility] *LIVE*               

  PAGE 2   


RUN TIME: 0854                            Specimen Inquiry                    


-------------------------------------------------------------------

-------------------------





SPEC: [id number]    PATIENT: ASNDRA FRIED                 [id number]  

(Continued)


------------

--------------------------------------------------------------------------------








---------------------------------------------

-----------------------------------------------





  Procedure                         Result                                      

         


 

--------------------------------------------------------------------------------


------------








                               ** CONTINUED ON NEXT PAGE **





 

--------------------------------------------------------------------------------


------------





RUN DATE: 11/19/21                  [facility] *LIVE*               

  PAGE 3   


RUN TIME: 0854                            Specimen Inquiry                    


------------------------------------------------------------------------------

--------------





SPEC: [id number]    PATIENT: SANDRA FRIED IVETH                 [id number]  

(Continued)


-----------------------

---------------------------------------------------------------------








--------------------------------------------------------

------------------------------------





  Procedure                         Result                                      

         


 

--------------------------------------------------------------------------------


------------





  ANTIMICROBIAL SUSCEPTIBILITY  Preliminary   (continued)


        TETRACYCLINE                      8                     I


        VANCOMYCIN                        1                     S


        Unless otherwise specified, Testing Performed by:


        [address] [address]


        For Inquires, the Physician may contact the Microbiology


        department at [phone]





Objective


Assessment


IMPRESSION:


1.  Postsurgical spine infection.  MSSA


2.  Status post right-sided hemilaminotomy and microdiscectomy L3-L4 done on 


11/03/2021.


3.  Diabetes mellitus.


4.  Hypertension.


5.  Leukocytosis.


6.  Renal insufficiency.





Plan


Plan of Care


Continue supportive care blood pressure management and pain control per 

hospitalist and neurosurgery


Change daptomycin to cefazolin


Patient will need IV antibiotics to go home whenever pain is under control











LIVIER PRITCHETT MD               Nov 20, 2021 11:33

## 2021-11-20 NOTE — PDOC
[employer] PROGRESS NOTE


Date of Service


DOS:


DATE: 11/20/21 


TIME: 11:56





Chief Complaint


Chief Complaint


ACE inhibitor induced angioedema


Postoperative wound infection








Continue with IV Solu-Medrol twice daily then transition to prednisone taper on 

11/19/2021


Appreciate infectious disease recommendations for antibiotic management


Pending blood and wound cultures


Would avoid ACE inhibitor for now, possible ARB in the future.


Pending C4 levels and C1 esterase levels





History of Present Illness


History of Present Illness


56-year-old male who underwent lumbar hemilaminectomy of the L3-L4 levels on 

November 3, 2021.  He was actually discharged home the following day was doing 

well.  Unfortunately patient had a surgical site complication with infection he 

was started on Keflex and failed p.o. antibiotics over the weekend.  There was 

increasing pain and serous drainage from incision and he was brought in for 

further evaluation and IV antibiotics.  He was given fentanyl for pain and 

patient was on lisinopril chronically.  Unfortunately patient had angioedema 

most likely with his use of ACE inhibitors.  Infectious diseases consulted for 

antibiotic management.  Patient interviewed bedside and he was doing well.  He 

denies any throat swelling or wheezing or trouble breathing.  He does not have 

any periorbital edema or facial swelling.  Only upper portion of his lip is 

swollen and his tongue is fine.  He is speaking in full sentences without any 

use of his accessory muscles.  Denies fevers, confusion, syncope, chest pain, 

shortness of breath, abdominal pain, diarrhea or hematuria.





11/18/2021


Patient had an acute agitative and confusion episode last night.  Patient ripped

out the sharps container from the wall.  He appears to be close to his baseline 

today and appears lethargic but talking on the phone.  Sitting on the recliner 

and not agitated at this time.  No fevers somewhat tachycardic and no 

hypotensive episodes.  Swelling of his upper lip appears to be improved.  No 

redness or swelling.  Patient will likely benefit from a PICC line placement for

long-term IV antibiotics.  Will defer the timing and course to infectious 

disease.  Patient's chart, labs, images were reviewed and discussed with RN





11/19/2021


No acute events overnight.  Patient is ambulating through the hallways back pain

.  There is clear drainage on the dressings which will need to be switched out. 

Blood pressures have been uncontrolled with systolic blood pressures from 1 60-1

90.  He does have IV antihypertensive medications as needed.  I have increased 

his hydralazine to 50 mg 3 times daily.  His sugars have also been greater than 

300 and as high as 400.  I have added a long-acting Lantus 10 units twice daily 

and keeping him on regular insulin sliding scale.  Patient usually does have an 

insulin pump in which he had his friend bring.  However due to his staff aureus 

infection I would recommend him not to place the insulin pump at this time and 

only administer insulin as needed and do subcu injections.  I have also 

decreased his Solu-Medrol to a prednisone taper at 40 mg daily for now.





11/20/2021


No acute events overnight.  Patient seen and examined bedside.  Resting 

comfortably.  Appears pain is well controlled.  Blood pressures have improved.  

Will decrease his prednisone to 20 mg daily.  Continue his 5 units 3 times daily

with meals of lispro and long-acting glargine 10 units twice daily with sliding 

scale high intensity.  Patient's chart, labs, images were reviewed and discussed

with RN





Vitals/I&O


Vitals/I&O:





                                   Vital Signs








  Date Time  Temp Pulse Resp B/P (MAP) Pulse Ox O2 Delivery O2 Flow Rate FiO2


 


11/20/21 11:00 98.0 83 16 143/90 (107) 96 Room Air  





 98.0       











Physical Exam


Physical Exam:


GENERAL:  Alert, oriented gentleman, not in distress.


VITAL SIGNS:  Stable. 


HEENT:  Both pupils are round and reacting.  No conjunctival lesion. No lesion 


in the mouth.


NECK:  Supple. No JVP. No lymphadenopathy.


LUNGS:  Clear.


HEART:  S1, S2, regular.


ABDOMEN:  Soft, nontender. No organomegaly.


EXTREMITIES:  No edema, cyanosis.


SKIN:  The patient does have some hives from fentanyl and lip swelling.


SPINE: There is incision with drainage present.


NEUROLOGIC:  The patient is alert, awake and appropriate. No focal neurologic 


deficit.


General:  Alert, Oriented X3, Cooperative, Other (Facial swelling resolved)


Heart:  Regular rate


Lungs:  Clear


Abdomen:  Normal bowel sounds


Extremities:  No clubbing


Skin:  Other (dressing intact, has been recently changed per RN)





Labs


Labs:





Laboratory Tests








Test


 11/19/21


12:03 11/19/21


12:35 11/19/21


17:30 11/19/21


20:26


 


Glucose (Fingerstick)


 387 mg/dL


(70-99) 


 390 mg/dL


(70-99) 420 mg/dL


(70-99)


 


White Blood Count


 


 14.0 x10^3/uL


(4.0-11.0) 


 





 


Red Blood Count


 


 5.61 x10^6/uL


(4.30-5.70) 


 





 


Hemoglobin


 


 16.1 g/dL


(13.0-17.5) 


 





 


Hematocrit


 


 48.8 %


(39.0-53.0) 


 





 


Mean Corpuscular Volume  87 fL ()   


 


Mean Corpuscular Hemoglobin  29 pg (25-35)   


 


Mean Corpuscular Hemoglobin


Concent 


 33 g/dL


(31-37) 


 





 


Red Cell Distribution Width


 


 14.8 %


(11.5-14.5) 


 





 


Platelet Count


 


 289 x10^3/uL


(140-400) 


 





 


Neutrophils (%) (Auto)  91 % (31-73)   


 


Lymphocytes (%) (Auto)  5 % (24-48)   


 


Monocytes (%) (Auto)  4 % (0-9)   


 


Eosinophils (%) (Auto)  0 % (0-3)   


 


Basophils (%) (Auto)  0 % (0-3)   


 


Neutrophils # (Auto)


 


 12.8 x10^3/uL


(1.8-7.7) 


 





 


Lymphocytes # (Auto)


 


 0.7 x10^3/uL


(1.0-4.8) 


 





 


Monocytes # (Auto)


 


 0.5 x10^3/uL


(0.0-1.1) 


 





 


Eosinophils # (Auto)


 


 0.0 x10^3/uL


(0.0-0.7) 


 





 


Basophils # (Auto)


 


 0.0 x10^3/uL


(0.0-0.2) 


 





 


Sodium Level


 


 134 mmol/L


(136-145) 


 





 


Potassium Level


 


 4.6 mmol/L


(3.5-5.1) 


 





 


Chloride Level


 


 96 mmol/L


() 


 





 


Carbon Dioxide Level


 


 26 mmol/L


(21-32) 


 





 


Anion Gap  12 (6-14)   


 


Blood Urea Nitrogen


 


 32 mg/dL


(8-26) 


 





 


Creatinine


 


 1.7 mg/dL


(0.7-1.3) 


 





 


Estimated GFR


(Cockcroft-Gault) 


 42.1 


 


 





 


Glucose Level


 


 374 mg/dL


(70-99) 


 





 


Calcium Level


 


 9.2 mg/dL


(8.5-10.1) 


 





 


Test


 11/20/21


01:55 11/20/21


07:41 11/20/21


08:30 11/20/21


11:49


 


Glucose (Fingerstick)


 356 mg/dL


(70-99) 305 mg/dL


(70-99) 


 273 mg/dL


(70-99)


 


White Blood Count


 


 


 10.4 x10^3/uL


(4.0-11.0) 





 


Red Blood Count


 


 


 5.91 x10^6/uL


(4.30-5.70) 





 


Hemoglobin


 


 


 17.5 g/dL


(13.0-17.5) 





 


Hematocrit


 


 


 52.1 %


(39.0-53.0) 





 


Mean Corpuscular Volume   88 fL ()  


 


Mean Corpuscular Hemoglobin   30 pg (25-35)  


 


Mean Corpuscular Hemoglobin


Concent 


 


 34 g/dL


(31-37) 





 


Red Cell Distribution Width


 


 


 14.7 %


(11.5-14.5) 





 


Platelet Count


 


 


 245 x10^3/uL


(140-400) 





 


Neutrophils (%) (Auto)   77 % (31-73)  


 


Lymphocytes (%) (Auto)   14 % (24-48)  


 


Monocytes (%) (Auto)   8 % (0-9)  


 


Eosinophils (%) (Auto)   0 % (0-3)  


 


Basophils (%) (Auto)   1 % (0-3)  


 


Neutrophils # (Auto)


 


 


 8.1 x10^3/uL


(1.8-7.7) 





 


Lymphocytes # (Auto)


 


 


 1.5 x10^3/uL


(1.0-4.8) 





 


Monocytes # (Auto)


 


 


 0.8 x10^3/uL


(0.0-1.1) 





 


Eosinophils # (Auto)


 


 


 0.0 x10^3/uL


(0.0-0.7) 





 


Basophils # (Auto)


 


 


 0.1 x10^3/uL


(0.0-0.2) 





 


Sodium Level


 


 


 137 mmol/L


(136-145) 





 


Potassium Level


 


 


 3.9 mmol/L


(3.5-5.1) 





 


Chloride Level


 


 


 96 mmol/L


() 





 


Carbon Dioxide Level


 


 


 32 mmol/L


(21-32) 





 


Anion Gap   9 (6-14)  


 


Blood Urea Nitrogen


 


 


 30 mg/dL


(8-26) 





 


Creatinine


 


 


 1.4 mg/dL


(0.7-1.3) 





 


Estimated GFR


(Cockcroft-Gault) 


 


 52.6 


 





 


Glucose Level


 


 


 283 mg/dL


(70-99) 





 


Calcium Level


 


 


 9.2 mg/dL


(8.5-10.1) 














Comment


Review of Relevant


I have reviewed the following items susan (where applicable) has been applied.


Medications:





Current Medications








 Medications


  (Trade)  Dose


 Ordered  Sig/Faheem


 Route


 PRN Reason  Start Time


 Stop Time Status Last Admin


Dose Admin


 


 Hydralazine HCl


  (Apresoline)  50 mg  TID


 PO


   11/19/21 14:00


    11/20/21 08:31





 


 Prednisone


  (Prednisone)  40 mg  DAILY


 PO


   11/20/21 09:00


 11/20/21 09:15 DC 11/20/21 08:31





 


 Insulin Human


 Lispro


  (HumaLOG)  5 units  TIDWMEALS


 SQ


   11/19/21 17:00


    11/20/21 08:22





 


 Insulin Human


 Lispro


  (HumaLOG)  20 units  1X  ONCE


 SQ


   11/19/21 18:00


 11/19/21 18:03 DC 11/19/21 18:18














Justifications for Admission


Other Justification














TAMARA PRATHER MD                  Nov 20, 2021 11:57

## 2021-11-21 VITALS — DIASTOLIC BLOOD PRESSURE: 100 MMHG | SYSTOLIC BLOOD PRESSURE: 152 MMHG

## 2021-11-21 VITALS — SYSTOLIC BLOOD PRESSURE: 162 MMHG | DIASTOLIC BLOOD PRESSURE: 102 MMHG

## 2021-11-21 VITALS — DIASTOLIC BLOOD PRESSURE: 112 MMHG | SYSTOLIC BLOOD PRESSURE: 178 MMHG

## 2021-11-21 VITALS — SYSTOLIC BLOOD PRESSURE: 188 MMHG | DIASTOLIC BLOOD PRESSURE: 129 MMHG

## 2021-11-21 VITALS — DIASTOLIC BLOOD PRESSURE: 110 MMHG | SYSTOLIC BLOOD PRESSURE: 157 MMHG

## 2021-11-21 VITALS — DIASTOLIC BLOOD PRESSURE: 117 MMHG | SYSTOLIC BLOOD PRESSURE: 188 MMHG

## 2021-11-21 VITALS — SYSTOLIC BLOOD PRESSURE: 181 MMHG | DIASTOLIC BLOOD PRESSURE: 108 MMHG

## 2021-11-21 VITALS — SYSTOLIC BLOOD PRESSURE: 146 MMHG | DIASTOLIC BLOOD PRESSURE: 109 MMHG

## 2021-11-21 LAB
ANION GAP SERPL CALC-SCNC: 7 MMOL/L (ref 6–14)
BASOPHILS # BLD AUTO: 0 X10^3/UL (ref 0–0.2)
BASOPHILS NFR BLD: 0 % (ref 0–3)
BUN SERPL-MCNC: 31 MG/DL (ref 8–26)
CALCIUM SERPL-MCNC: 9 MG/DL (ref 8.5–10.1)
CHLORIDE SERPL-SCNC: 95 MMOL/L (ref 98–107)
CO2 SERPL-SCNC: 33 MMOL/L (ref 21–32)
CREAT SERPL-MCNC: 1.5 MG/DL (ref 0.7–1.3)
EOSINOPHIL NFR BLD: 0 % (ref 0–3)
EOSINOPHIL NFR BLD: 0 X10^3/UL (ref 0–0.7)
ERYTHROCYTE [DISTWIDTH] IN BLOOD BY AUTOMATED COUNT: 14.7 % (ref 11.5–14.5)
GFR SERPLBLD BASED ON 1.73 SQ M-ARVRAT: 48.6 ML/MIN
GLUCOSE SERPL-MCNC: 281 MG/DL (ref 70–99)
HCT VFR BLD CALC: 49.8 % (ref 39–53)
HGB BLD-MCNC: 16.4 G/DL (ref 13–17.5)
LYMPHOCYTES # BLD: 1 X10^3/UL (ref 1–4.8)
LYMPHOCYTES NFR BLD AUTO: 9 % (ref 24–48)
MCH RBC QN AUTO: 29 PG (ref 25–35)
MCHC RBC AUTO-ENTMCNC: 33 G/DL (ref 31–37)
MCV RBC AUTO: 88 FL (ref 79–100)
MONO #: 0.4 X10^3/UL (ref 0–1.1)
MONOCYTES NFR BLD: 4 % (ref 0–9)
NEUT #: 10.4 X10^3/UL (ref 1.8–7.7)
NEUTROPHILS NFR BLD AUTO: 88 % (ref 31–73)
PLATELET # BLD AUTO: 247 X10^3/UL (ref 140–400)
POTASSIUM SERPL-SCNC: 4.3 MMOL/L (ref 3.5–5.1)
RBC # BLD AUTO: 5.63 X10^6/UL (ref 4.3–5.7)
SODIUM SERPL-SCNC: 135 MMOL/L (ref 136–145)
WBC # BLD AUTO: 11.9 X10^3/UL (ref 4–11)

## 2021-11-21 RX ADMIN — OXYCODONE HYDROCHLORIDE AND ACETAMINOPHEN PRN TAB: 5; 325 TABLET ORAL at 02:37

## 2021-11-21 RX ADMIN — INSULIN LISPRO SCH UNITS: 100 INJECTION, SOLUTION INTRAVENOUS; SUBCUTANEOUS at 11:16

## 2021-11-21 RX ADMIN — MORPHINE SULFATE PRN MG: 2 INJECTION, SOLUTION INTRAMUSCULAR; INTRAVENOUS at 17:06

## 2021-11-21 RX ADMIN — POLYETHYLENE GLYCOL 3350 PRN GM: 17 POWDER, FOR SOLUTION ORAL at 09:40

## 2021-11-21 RX ADMIN — METOPROLOL TARTRATE SCH MG: 50 TABLET, FILM COATED ORAL at 21:08

## 2021-11-21 RX ADMIN — FAMOTIDINE SCH MG: 10 INJECTION, SOLUTION INTRAVENOUS at 21:08

## 2021-11-21 RX ADMIN — OXYCODONE HYDROCHLORIDE AND ACETAMINOPHEN PRN TAB: 5; 325 TABLET ORAL at 06:16

## 2021-11-21 RX ADMIN — INSULIN GLARGINE SCH UNIT: 100 INJECTION, SOLUTION SUBCUTANEOUS at 09:44

## 2021-11-21 RX ADMIN — LABETALOL HYDROCHLORIDE PRN MG: 5 INJECTION, SOLUTION INTRAVENOUS at 03:01

## 2021-11-21 RX ADMIN — PROBENECID SCH MG: 500 TABLET, FILM COATED ORAL at 21:08

## 2021-11-21 RX ADMIN — FAMOTIDINE SCH MG: 10 INJECTION, SOLUTION INTRAVENOUS at 07:37

## 2021-11-21 RX ADMIN — INSULIN GLARGINE SCH UNIT: 100 INJECTION, SOLUTION SUBCUTANEOUS at 21:15

## 2021-11-21 RX ADMIN — OXYCODONE HYDROCHLORIDE AND ACETAMINOPHEN PRN TAB: 5; 325 TABLET ORAL at 11:13

## 2021-11-21 RX ADMIN — ASPIRIN 81 MG SCH MG: 81 TABLET ORAL at 07:38

## 2021-11-21 RX ADMIN — LABETALOL HYDROCHLORIDE PRN MG: 5 INJECTION, SOLUTION INTRAVENOUS at 05:01

## 2021-11-21 RX ADMIN — MORPHINE SULFATE PRN MG: 2 INJECTION, SOLUTION INTRAMUSCULAR; INTRAVENOUS at 00:48

## 2021-11-21 RX ADMIN — LABETALOL HYDROCHLORIDE PRN MG: 5 INJECTION, SOLUTION INTRAVENOUS at 00:53

## 2021-11-21 RX ADMIN — EZETIMIBE SCH MG: 10 TABLET ORAL at 07:38

## 2021-11-21 RX ADMIN — ATORVASTATIN CALCIUM SCH MG: 40 TABLET, FILM COATED ORAL at 07:38

## 2021-11-21 RX ADMIN — INSULIN LISPRO SCH UNITS: 100 INJECTION, SOLUTION INTRAVENOUS; SUBCUTANEOUS at 07:44

## 2021-11-21 RX ADMIN — DOCUSATE SODIUM SCH MG: 100 CAPSULE, LIQUID FILLED ORAL at 21:08

## 2021-11-21 RX ADMIN — INSULIN LISPRO SCH UNITS: 100 INJECTION, SOLUTION INTRAVENOUS; SUBCUTANEOUS at 17:00

## 2021-11-21 RX ADMIN — INSULIN LISPRO SCH UNITS: 100 INJECTION, SOLUTION INTRAVENOUS; SUBCUTANEOUS at 17:09

## 2021-11-21 RX ADMIN — Medication SCH CAP: at 07:37

## 2021-11-21 RX ADMIN — METOPROLOL TARTRATE SCH MG: 50 TABLET, FILM COATED ORAL at 07:38

## 2021-11-21 RX ADMIN — DOCUSATE SODIUM SCH MG: 100 CAPSULE, LIQUID FILLED ORAL at 07:38

## 2021-11-21 RX ADMIN — Medication SCH CAP: at 21:09

## 2021-11-21 RX ADMIN — MORPHINE SULFATE PRN MG: 2 INJECTION, SOLUTION INTRAMUSCULAR; INTRAVENOUS at 07:37

## 2021-11-21 RX ADMIN — MORPHINE SULFATE PRN MG: 2 INJECTION, SOLUTION INTRAMUSCULAR; INTRAVENOUS at 13:56

## 2021-11-21 RX ADMIN — METHOCARBAMOL PRN MG: 750 TABLET ORAL at 02:45

## 2021-11-21 RX ADMIN — OXYCODONE HYDROCHLORIDE AND ACETAMINOPHEN PRN TAB: 5; 325 TABLET ORAL at 21:16

## 2021-11-21 RX ADMIN — FEBUXOSTAT SCH MG: 40 TABLET ORAL at 07:37

## 2021-11-21 RX ADMIN — PROBENECID SCH MG: 500 TABLET, FILM COATED ORAL at 07:37

## 2021-11-21 NOTE — PDOC
[facility] PROGRESS NOTE


Date of Service


DOS:


DATE: 11/21/21 


TIME: 10:01





Chief Complaint


Chief Complaint


ACE inhibitor induced angioedema


Postoperative wound infection








Continue with IV Solu-Medrol twice daily then transition to prednisone taper on 

11/19/2021


Appreciate infectious disease recommendations for antibiotic management


Pending blood and wound cultures


Would avoid ACE inhibitor for now, possible ARB in the future.


Pending C4 levels and C1 esterase levels





History of Present Illness


History of Present Illness


56-year-old male who underwent lumbar hemilaminectomy of the L3-L4 levels on 

November 3, 2021.  He was actually discharged home the following day was doing 

well.  Unfortunately patient had a surgical site complication with infection he 

was started on Keflex and failed p.o. antibiotics over the weekend.  There was 

increasing pain and serous drainage from incision and he was brought in for 

further evaluation and IV antibiotics.  He was given fentanyl for pain and 

patient was on lisinopril chronically.  Unfortunately patient had angioedema 

most likely with his use of ACE inhibitors.  Infectious diseases consulted for 

antibiotic management.  Patient interviewed bedside and he was doing well.  He 

denies any throat swelling or wheezing or trouble breathing.  He does not have 

any periorbital edema or facial swelling.  Only upper portion of his lip is 

swollen and his tongue is fine.  He is speaking in full sentences without any 

use of his accessory muscles.  Denies fevers, confusion, syncope, chest pain, 

shortness of breath, abdominal pain, diarrhea or hematuria.





11/18/2021


Patient had an acute agitative and confusion episode last night.  Patient ripped

out the sharps container from the wall.  He appears to be close to his baseline 

today and appears lethargic but talking on the phone.  Sitting on the recliner 

and not agitated at this time.  No fevers somewhat tachycardic and no 

hypotensive episodes.  Swelling of his upper lip appears to be improved.  No 

redness or swelling.  Patient will likely benefit from a PICC line placement for

long-term IV antibiotics.  Will defer the timing and course to infectious 

disease.  Patient's chart, labs, images were reviewed and discussed with RN





11/19/2021


No acute events overnight.  Patient is ambulating through the hallways back pain

.  There is clear drainage on the dressings which will need to be switched out. 

Blood pressures have been uncontrolled with systolic blood pressures from 1 60-1

90.  He does have IV antihypertensive medications as needed.  I have increased 

his hydralazine to 50 mg 3 times daily.  His sugars have also been greater than 

300 and as high as 400.  I have added a long-acting Lantus 10 units twice daily 

and keeping him on regular insulin sliding scale.  Patient usually does have an 

insulin pump in which he had his friend bring.  However due to his staff aureus 

infection I would recommend him not to place the insulin pump at this time and 

only administer insulin as needed and do subcu injections.  I have also 

decreased his Solu-Medrol to a prednisone taper at 40 mg daily for now.





11/20/2021


No acute events overnight.  Patient seen and examined bedside.  Resting 

comfortably.  Appears pain is well controlled.  Blood pressures have improved.  

Will decrease his prednisone to 20 mg daily.  Continue his 5 units 3 times daily

with meals of lispro and long-acting glargine 10 units twice daily with sliding 

scale high intensity.  Patient's chart, labs, images were reviewed and discussed

with RN





11/21/2021


No acute events overnight.  Patient yesterday had some swelling around the eyes 

and I gave a dose of pulse IV steroids.  Swelling and irritation or hives around

the periorbital region has improved.  Patient has some drug-seeking behavior 

which she was requiring only IV pain medications.  Patient seen and examined 

bedside.  Patient was using his cell phone at the time and appeared to be 

content.  Not in any active pain at the time.  No acute agitative episodes 

overnight.  Wound is appearing improved without much drainage.  Continue to 

taper steroids and continued IV antibiotics per ID.  I have increased her 

glargine to 15 units twice daily and increased his premale lispro to 10 units 

before meals.  Patient's chart, labs, images were reviewed and discussed with RN





Vitals/I&O


Vitals/I&O:





                                   Vital Signs








  Date Time  Temp Pulse Resp B/P (MAP) Pulse Ox O2 Delivery O2 Flow Rate FiO2


 


11/21/21 09:40     97 Room Air  


 


11/21/21 09:00  86  152/100    


 


11/21/21 07:00 97.6  18     





 97.6       














                                    I & O   


 


 11/20/21 11/20/21 11/21/21





 15:00 23:00 07:00


 


Intake Total   240 ml


 


Output Total  300 ml 


 


Balance  -300 ml 240 ml











Physical Exam


Physical Exam:


GENERAL:  Alert, oriented gentleman, not in distress.


VITAL SIGNS:  Stable. 


HEENT:  Both pupils are round and reacting.  No conjunctival lesion. No lesion 


in the mouth.


NECK:  Supple. No JVP. No lymphadenopathy.


LUNGS:  Clear.


HEART:  S1, S2, regular.


ABDOMEN:  Soft, nontender. No organomegaly.


EXTREMITIES:  No edema, cyanosis.


SKIN:  The patient does have some hives from fentanyl and lip swelling.


SPINE: There is incision with drainage present.


NEUROLOGIC:  The patient is alert, awake and appropriate. No focal neurologic 


deficit.


General:  Alert, Oriented X3, Cooperative, Other (facial swelling improved)


Heart:  Regular rate


Lungs:  Clear


Abdomen:  Normal bowel sounds


Extremities:  No clubbing


Skin:  Other (dressing with some ss drainge, flat, intact)





Labs


Labs:





Laboratory Tests








Test


 11/20/21


11:49 11/20/21


17:16 11/20/21


20:48 11/21/21


07:15


 


Glucose (Fingerstick)


 273 mg/dL


(70-99) 332 mg/dL


(70-99) 363 mg/dL


(70-99) 360 mg/dL


(70-99)


 


Test


 11/21/21


08:00 


 


 





 


White Blood Count


 11.9 x10^3/uL


(4.0-11.0) 


 


 





 


Red Blood Count


 5.63 x10^6/uL


(4.30-5.70) 


 


 





 


Hemoglobin


 16.4 g/dL


(13.0-17.5) 


 


 





 


Hematocrit


 49.8 %


(39.0-53.0) 


 


 





 


Mean Corpuscular Volume 88 fL ()    


 


Mean Corpuscular Hemoglobin 29 pg (25-35)    


 


Mean Corpuscular Hemoglobin


Concent 33 g/dL


(31-37) 


 


 





 


Red Cell Distribution Width


 14.7 %


(11.5-14.5) 


 


 





 


Platelet Count


 247 x10^3/uL


(140-400) 


 


 





 


Neutrophils (%) (Auto) 88 % (31-73)    


 


Lymphocytes (%) (Auto) 9 % (24-48)    


 


Monocytes (%) (Auto) 4 % (0-9)    


 


Eosinophils (%) (Auto) 0 % (0-3)    


 


Basophils (%) (Auto) 0 % (0-3)    


 


Neutrophils # (Auto)


 10.4 x10^3/uL


(1.8-7.7) 


 


 





 


Lymphocytes # (Auto)


 1.0 x10^3/uL


(1.0-4.8) 


 


 





 


Monocytes # (Auto)


 0.4 x10^3/uL


(0.0-1.1) 


 


 





 


Eosinophils # (Auto)


 0.0 x10^3/uL


(0.0-0.7) 


 


 





 


Basophils # (Auto)


 0.0 x10^3/uL


(0.0-0.2) 


 


 





 


Sodium Level


 135 mmol/L


(136-145) 


 


 





 


Potassium Level


 4.3 mmol/L


(3.5-5.1) 


 


 





 


Chloride Level


 95 mmol/L


() 


 


 





 


Carbon Dioxide Level


 33 mmol/L


(21-32) 


 


 





 


Anion Gap 7 (6-14)    


 


Blood Urea Nitrogen


 31 mg/dL


(8-26) 


 


 





 


Creatinine


 1.5 mg/dL


(0.7-1.3) 


 


 





 


Estimated GFR


(Cockcroft-Gault) 48.6 


 


 


 





 


Glucose Level


 281 mg/dL


(70-99) 


 


 





 


Calcium Level


 9.0 mg/dL


(8.5-10.1) 


 


 














Comment


Review of Relevant


I have reviewed the following items susan (where applicable) has been applied.


Medications:





Current Medications








 Medications


  (Trade)  Dose


 Ordered  Sig/Faheem


 Route


 PRN Reason  Start Time


 Stop Time Status Last Admin


Dose Admin


 


 Prednisone


  (Prednisone)  20 mg  DAILY


 PO


   11/21/21 09:00


    11/21/21 07:38





 


 Cefazolin Sodium/


 Dextrose  50 ml @ 


 100 mls/hr  Q8HRS


 IV


   11/20/21 14:00


    11/21/21 06:15





 


 Methylprednisolone


 Sodium Succinate


  (SOLU-Medrol


 125MG VIAL)  125 mg  1X  ONCE


 IV


   11/20/21 14:30


 11/20/21 14:31 DC 11/20/21 14:59





 


 Amlodipine


 Besylate


  (Norvasc)  10 mg  DAILY


 PO


   11/21/21 09:00


    11/21/21 09:00





 


 Insulin Glargine


  (Lantus Syringe)  15 unit  BID


 SQ


   11/21/21 09:00


    11/21/21 09:44














Justifications for Admission


Other Justification














TAMARA PRATHER MD                  Nov 21, 2021 10:03

## 2021-11-22 VITALS — DIASTOLIC BLOOD PRESSURE: 70 MMHG | SYSTOLIC BLOOD PRESSURE: 119 MMHG

## 2021-11-22 VITALS — DIASTOLIC BLOOD PRESSURE: 120 MMHG | SYSTOLIC BLOOD PRESSURE: 161 MMHG

## 2021-11-22 VITALS — DIASTOLIC BLOOD PRESSURE: 113 MMHG | SYSTOLIC BLOOD PRESSURE: 179 MMHG

## 2021-11-22 VITALS — DIASTOLIC BLOOD PRESSURE: 103 MMHG | SYSTOLIC BLOOD PRESSURE: 145 MMHG

## 2021-11-22 VITALS — SYSTOLIC BLOOD PRESSURE: 175 MMHG | DIASTOLIC BLOOD PRESSURE: 87 MMHG

## 2021-11-22 VITALS — DIASTOLIC BLOOD PRESSURE: 97 MMHG | SYSTOLIC BLOOD PRESSURE: 158 MMHG

## 2021-11-22 VITALS — DIASTOLIC BLOOD PRESSURE: 117 MMHG | SYSTOLIC BLOOD PRESSURE: 198 MMHG

## 2021-11-22 VITALS — SYSTOLIC BLOOD PRESSURE: 129 MMHG | DIASTOLIC BLOOD PRESSURE: 75 MMHG

## 2021-11-22 VITALS — SYSTOLIC BLOOD PRESSURE: 168 MMHG | DIASTOLIC BLOOD PRESSURE: 115 MMHG

## 2021-11-22 VITALS — SYSTOLIC BLOOD PRESSURE: 169 MMHG | DIASTOLIC BLOOD PRESSURE: 116 MMHG

## 2021-11-22 VITALS — DIASTOLIC BLOOD PRESSURE: 112 MMHG | SYSTOLIC BLOOD PRESSURE: 167 MMHG

## 2021-11-22 VITALS — SYSTOLIC BLOOD PRESSURE: 181 MMHG | DIASTOLIC BLOOD PRESSURE: 118 MMHG

## 2021-11-22 VITALS — SYSTOLIC BLOOD PRESSURE: 147 MMHG | DIASTOLIC BLOOD PRESSURE: 87 MMHG

## 2021-11-22 VITALS — SYSTOLIC BLOOD PRESSURE: 121 MMHG | DIASTOLIC BLOOD PRESSURE: 82 MMHG

## 2021-11-22 LAB
ANION GAP SERPL CALC-SCNC: 5 MMOL/L (ref 6–14)
BASOPHILS # BLD AUTO: 0.1 X10^3/UL (ref 0–0.2)
BASOPHILS NFR BLD: 1 % (ref 0–3)
BUN SERPL-MCNC: 26 MG/DL (ref 8–26)
CALCIUM SERPL-MCNC: 8.5 MG/DL (ref 8.5–10.1)
CHLORIDE SERPL-SCNC: 97 MMOL/L (ref 98–107)
CO2 SERPL-SCNC: 34 MMOL/L (ref 21–32)
CREAT SERPL-MCNC: 1.4 MG/DL (ref 0.7–1.3)
EOSINOPHIL NFR BLD: 0.1 X10^3/UL (ref 0–0.7)
EOSINOPHIL NFR BLD: 1 % (ref 0–3)
ERYTHROCYTE [DISTWIDTH] IN BLOOD BY AUTOMATED COUNT: 14.4 % (ref 11.5–14.5)
GFR SERPLBLD BASED ON 1.73 SQ M-ARVRAT: 52.6 ML/MIN
GLUCOSE SERPL-MCNC: 257 MG/DL (ref 70–99)
HCT VFR BLD CALC: 50.1 % (ref 39–53)
HGB BLD-MCNC: 16.6 G/DL (ref 13–17.5)
LYMPHOCYTES # BLD: 1.8 X10^3/UL (ref 1–4.8)
LYMPHOCYTES NFR BLD AUTO: 19 % (ref 24–48)
MCH RBC QN AUTO: 29 PG (ref 25–35)
MCHC RBC AUTO-ENTMCNC: 33 G/DL (ref 31–37)
MCV RBC AUTO: 88 FL (ref 79–100)
MONO #: 0.7 X10^3/UL (ref 0–1.1)
MONOCYTES NFR BLD: 7 % (ref 0–9)
NEUT #: 7.1 X10^3/UL (ref 1.8–7.7)
NEUTROPHILS NFR BLD AUTO: 73 % (ref 31–73)
PLATELET # BLD AUTO: 215 X10^3/UL (ref 140–400)
POTASSIUM SERPL-SCNC: 3.9 MMOL/L (ref 3.5–5.1)
RBC # BLD AUTO: 5.69 X10^6/UL (ref 4.3–5.7)
SODIUM SERPL-SCNC: 136 MMOL/L (ref 136–145)
WBC # BLD AUTO: 9.8 X10^3/UL (ref 4–11)

## 2021-11-22 RX ADMIN — FAMOTIDINE SCH MG: 20 TABLET ORAL at 20:47

## 2021-11-22 RX ADMIN — ATORVASTATIN CALCIUM SCH MG: 40 TABLET, FILM COATED ORAL at 08:16

## 2021-11-22 RX ADMIN — DOCUSATE SODIUM SCH MG: 100 CAPSULE, LIQUID FILLED ORAL at 08:14

## 2021-11-22 RX ADMIN — EZETIMIBE SCH MG: 10 TABLET ORAL at 08:15

## 2021-11-22 RX ADMIN — FAMOTIDINE SCH MG: 10 INJECTION, SOLUTION INTRAVENOUS at 09:00

## 2021-11-22 RX ADMIN — METHOCARBAMOL PRN MG: 750 TABLET ORAL at 17:58

## 2021-11-22 RX ADMIN — INSULIN LISPRO SCH UNITS: 100 INJECTION, SOLUTION INTRAVENOUS; SUBCUTANEOUS at 08:32

## 2021-11-22 RX ADMIN — INSULIN LISPRO SCH UNITS: 100 INJECTION, SOLUTION INTRAVENOUS; SUBCUTANEOUS at 18:06

## 2021-11-22 RX ADMIN — LABETALOL HYDROCHLORIDE PRN MG: 5 INJECTION, SOLUTION INTRAVENOUS at 08:18

## 2021-11-22 RX ADMIN — DOCUSATE SODIUM SCH MG: 100 CAPSULE, LIQUID FILLED ORAL at 20:50

## 2021-11-22 RX ADMIN — ASPIRIN 81 MG SCH MG: 81 TABLET ORAL at 08:15

## 2021-11-22 RX ADMIN — METOPROLOL TARTRATE SCH MG: 50 TABLET, FILM COATED ORAL at 20:46

## 2021-11-22 RX ADMIN — LABETALOL HYDROCHLORIDE PRN MG: 5 INJECTION, SOLUTION INTRAVENOUS at 23:39

## 2021-11-22 RX ADMIN — MORPHINE SULFATE PRN MG: 2 INJECTION, SOLUTION INTRAMUSCULAR; INTRAVENOUS at 14:50

## 2021-11-22 RX ADMIN — PROBENECID SCH MG: 500 TABLET, FILM COATED ORAL at 08:17

## 2021-11-22 RX ADMIN — Medication SCH CAP: at 20:46

## 2021-11-22 RX ADMIN — OXYCODONE HYDROCHLORIDE AND ACETAMINOPHEN PRN TAB: 5; 325 TABLET ORAL at 08:14

## 2021-11-22 RX ADMIN — INSULIN LISPRO SCH UNITS: 100 INJECTION, SOLUTION INTRAVENOUS; SUBCUTANEOUS at 12:41

## 2021-11-22 RX ADMIN — INSULIN LISPRO SCH UNITS: 100 INJECTION, SOLUTION INTRAVENOUS; SUBCUTANEOUS at 08:34

## 2021-11-22 RX ADMIN — METOPROLOL TARTRATE SCH MG: 50 TABLET, FILM COATED ORAL at 08:17

## 2021-11-22 RX ADMIN — INSULIN GLARGINE SCH UNIT: 100 INJECTION, SOLUTION SUBCUTANEOUS at 22:03

## 2021-11-22 RX ADMIN — Medication SCH CAP: at 08:16

## 2021-11-22 RX ADMIN — LABETALOL HYDROCHLORIDE PRN MG: 5 INJECTION, SOLUTION INTRAVENOUS at 15:52

## 2021-11-22 RX ADMIN — LABETALOL HYDROCHLORIDE PRN MG: 5 INJECTION, SOLUTION INTRAVENOUS at 01:02

## 2021-11-22 RX ADMIN — PROBENECID SCH MG: 500 TABLET, FILM COATED ORAL at 20:46

## 2021-11-22 RX ADMIN — INSULIN GLARGINE SCH UNIT: 100 INJECTION, SOLUTION SUBCUTANEOUS at 10:20

## 2021-11-22 RX ADMIN — FEBUXOSTAT SCH MG: 40 TABLET ORAL at 08:15

## 2021-11-22 RX ADMIN — LABETALOL HYDROCHLORIDE PRN MG: 5 INJECTION, SOLUTION INTRAVENOUS at 18:02

## 2021-11-22 RX ADMIN — INSULIN LISPRO SCH UNITS: 100 INJECTION, SOLUTION INTRAVENOUS; SUBCUTANEOUS at 12:00

## 2021-11-22 RX ADMIN — INSULIN LISPRO SCH UNITS: 100 INJECTION, SOLUTION INTRAVENOUS; SUBCUTANEOUS at 18:07

## 2021-11-22 NOTE — PDOC
[facility] PROGRESS NOTE


Date of Service


DOS:


DATE: 11/22/21 


TIME: 10:44





Chief Complaint


Chief Complaint


ACE inhibitor induced angioedema with normal C4 and C1 esterase levels


Postoperative wound infection








Okay with discharge to home with home health


Recommend to continue prednisone for another 2 days at 20 mg daily.  No need for

further tapering as patient was only on high-dose steroids for less than a week


Recommend to continue with HCTZ, amlodipine, metoprolol and clonidine patch for 

blood pressure control.


Okay to continue with insulin pump as long as there is no other concerns for 

other sources of infection infection, especially in the skin or soft tissue area


Would avoid ACE inhibitor for now, and may consider possibly ARB in the future 

if blood pressure still poorly controlled as patient is a diabetic and would 

benefit significantly from a ARB for kidney protection





History of Present Illness


History of Present Illness


56-year-old male who underwent lumbar hemilaminectomy of the L3-L4 levels on 

November 3, 2021.  He was actually discharged home the following day was doing 

well.  Unfortunately patient had a surgical site complication with infection he 

was started on Keflex and failed p.o. antibiotics over the weekend.  There was 

increasing pain and serous drainage from incision and he was brought in for 

further evaluation and IV antibiotics.  He was given fentanyl for pain and 

patient was on lisinopril chronically.  Unfortunately patient had angioedema 

most likely with his use of ACE inhibitors.  Infectious diseases consulted for 

antibiotic management.  Patient interviewed bedside and he was doing well.  He 

denies any throat swelling or wheezing or trouble breathing.  He does not have 

any periorbital edema or facial swelling.  Only upper portion of his lip is 

swollen and his tongue is fine.  He is speaking in full sentences without any 

use of his accessory muscles.  Denies fevers, confusion, syncope, chest pain, 

shortness of breath, abdominal pain, diarrhea or hematuria.





11/18/2021


Patient had an acute agitative and confusion episode last night.  Patient ripped

out the sharps container from the wall.  He appears to be close to his baseline 

today and appears lethargic but talking on the phone.  Sitting on the recliner 

and not agitated at this time.  No fevers somewhat tachycardic and no 

hypotensive episodes.  Swelling of his upper lip appears to be improved.  No 

redness or swelling.  Patient will likely benefit from a PICC line placement for

long-term IV antibiotics.  Will defer the timing and course to infectious 

disease.  Patient's chart, labs, images were reviewed and discussed with RN





11/19/2021


No acute events overnight.  Patient is ambulating through the hallways back 

pain.  There is clear drainage on the dressings which will need to be switched 

out.  Blood pressures have been uncontrolled with systolic blood pressures from 

1 60-1 90.  He does have IV antihypertensive medications as needed.  I have 

increased his hydralazine to 50 mg 3 times daily.  His sugars have also been 

greater than 300 and as high as 400.  I have added a long-acting Lantus 10 units

twice daily and keeping him on regular insulin sliding scale.  Patient usually 

does have an insulin pump in which he had his friend bring.  However due to his 

staff aureus infection I would recommend him not to place the insulin pump at 

this time and only administer insulin as needed and do subcu injections.  I have

also decreased his Solu-Medrol to a prednisone taper at 40 mg daily for now.





11/20/2021


No acute events overnight.  Patient seen and examined bedside.  Resting 

comfortably.  Appears pain is well controlled.  Blood pressures have improved.  

Will decrease his prednisone to 20 mg daily.  Continue his 5 units 3 times daily

with meals of lispro and long-acting glargine 10 units twice daily with sliding 

scale high intensity.  Patient's chart, labs, images were reviewed and discussed

with RN





11/21/2021


No acute events overnight.  Patient yesterday had some swelling around the eyes 

and I gave a dose of pulse IV steroids.  Swelling and irritation or hives around

the periorbital region has improved.  Patient has some drug-seeking behavior 

which she was requiring only IV pain medications.  Patient seen and examined 

bedside.  Patient was using his cell phone at the time and appeared to be 

content.  Not in any active pain at the time.  No acute agitative episodes 

overnight.  Wound is appearing improved without much drainage.  Continue to 

taper steroids and continued IV antibiotics per ID.  I have increased her 

glargine to 15 units twice daily and increased his premale lispro to 10 units 

before meals.  Patient's chart, labs, images were reviewed and discussed with RN





Vitals/I&O


Vitals/I&O:





                                   Vital Signs








  Date Time  Temp Pulse Resp B/P (MAP) Pulse Ox O2 Delivery O2 Flow Rate FiO2


 


11/22/21 08:47  96 20 168/115 (132)    


 


11/22/21 07:15 97.9    96 Room Air  





 97.9       














                                    I & O   


 


 11/21/21 11/21/21 11/22/21





 15:00 23:00 07:00


 


Intake Total  200 ml 150 ml


 


Output Total   300 ml


 


Balance  200 ml -150 ml











Physical Exam


Physical Exam:


GENERAL:  Alert, oriented gentleman, not in distress.


VITAL SIGNS:  Stable. 


HEENT:  Both pupils are round and reacting.  No conjunctival lesion. No lesion 


in the mouth.


NECK:  Supple. No JVP. No lymphadenopathy.


LUNGS:  Clear.


HEART:  S1, S2, regular.


ABDOMEN:  Soft, nontender. No organomegaly.


EXTREMITIES:  No edema, cyanosis.


SKIN:  The patient does have some hives from fentanyl and lip swelling.


SPINE: There is incision with drainage present.


NEUROLOGIC:  The patient is alert, awake and appropriate. No focal neurologic 


deficit.


General:  Alert, Oriented X3, Cooperative, No acute distress, Other (facial 

swelling improved)


Heart:  Regular rate


Lungs:  Clear


Abdomen:  Normal bowel sounds


Extremities:  No clubbing


Skin:  Other (dressing changed per RN this morning, some yellow serous drainage 

noted)





Labs


Labs:





Laboratory Tests








Test


 11/21/21


11:06 11/21/21


16:37 11/21/21


21:16 11/22/21


07:01


 


Glucose (Fingerstick)


 341 mg/dL


(70-99) 319 mg/dL


(70-99) 368 mg/dL


(70-99) 317 mg/dL


(70-99)


 


Test


 11/22/21


07:45 


 


 





 


White Blood Count


 9.8 x10^3/uL


(4.0-11.0) 


 


 





 


Red Blood Count


 5.69 x10^6/uL


(4.30-5.70) 


 


 





 


Hemoglobin


 16.6 g/dL


(13.0-17.5) 


 


 





 


Hematocrit


 50.1 %


(39.0-53.0) 


 


 





 


Mean Corpuscular Volume 88 fL ()    


 


Mean Corpuscular Hemoglobin 29 pg (25-35)    


 


Mean Corpuscular Hemoglobin


Concent 33 g/dL


(31-37) 


 


 





 


Red Cell Distribution Width


 14.4 %


(11.5-14.5) 


 


 





 


Platelet Count


 215 x10^3/uL


(140-400) 


 


 





 


Neutrophils (%) (Auto) 73 % (31-73)    


 


Lymphocytes (%) (Auto) 19 % (24-48)    


 


Monocytes (%) (Auto) 7 % (0-9)    


 


Eosinophils (%) (Auto) 1 % (0-3)    


 


Basophils (%) (Auto) 1 % (0-3)    


 


Neutrophils # (Auto)


 7.1 x10^3/uL


(1.8-7.7) 


 


 





 


Lymphocytes # (Auto)


 1.8 x10^3/uL


(1.0-4.8) 


 


 





 


Monocytes # (Auto)


 0.7 x10^3/uL


(0.0-1.1) 


 


 





 


Eosinophils # (Auto)


 0.1 x10^3/uL


(0.0-0.7) 


 


 





 


Basophils # (Auto)


 0.1 x10^3/uL


(0.0-0.2) 


 


 





 


Erythrocyte Sedimentation Rate 6 (0-15)    


 


Sodium Level


 136 mmol/L


(136-145) 


 


 





 


Potassium Level


 3.9 mmol/L


(3.5-5.1) 


 


 





 


Chloride Level


 97 mmol/L


() 


 


 





 


Carbon Dioxide Level


 34 mmol/L


(21-32) 


 


 





 


Anion Gap 5 (6-14)    


 


Blood Urea Nitrogen


 26 mg/dL


(8-26) 


 


 





 


Creatinine


 1.4 mg/dL


(0.7-1.3) 


 


 





 


Estimated GFR


(Cockcroft-Gault) 52.6 


 


 


 





 


Glucose Level


 257 mg/dL


(70-99) 


 


 





 


Calcium Level


 8.5 mg/dL


(8.5-10.1) 


 


 





 


C-Reactive Protein,


Quantitative 8.2 mg/L


(0-3.3) 


 


 














Comment


Review of Relevant


I have reviewed the following items susan (where applicable) has been applied.


Medications:





Current Medications








 Medications


  (Trade)  Dose


 Ordered  Sig/Faheem


 Route


 PRN Reason  Start Time


 Stop Time Status Last Admin


Dose Admin


 


 Insulin Human


 Lispro


  (HumaLOG)  10 units  [id number]


 SQ


   11/21/21 12:00


    11/22/21 08:34














Justifications for Admission


Other Justification














TAMARA PRATHER MD                  Nov 22, 2021 10:48

## 2021-11-22 NOTE — NUR
have given at least 3 doses of labetalol for elevated blood pressure and had to call  for 
at least 2 blood sugars greater that 350. dressings have been changed at 3 times today. 
drainage remains unchanged

## 2021-11-22 NOTE — NUR
Dr. Gamboa regarding blood sugar 452. to give a total 15 units NovoLog insulin for sliding scale 
and increase Lantus 5 units bid(20 units total)

## 2021-11-22 NOTE — PDOC
PROGRESS NOTES


Date of Service


DATE: 11/22/21 


TIME: 10:06





Subjective


Subjective


feels better today


ambulating in room


pain improving


wants to go home





Objective


Objective





Vital Signs








  Date Time  Temp Pulse Resp B/P (MAP) Pulse Ox O2 Delivery O2 Flow Rate FiO2


 


11/22/21 08:47  96 20 168/115 (132)    


 


11/22/21 07:15 97.9    96 Room Air  





 97.9       














Intake and Output 


 


 11/22/21





 07:00


 


Intake Total 350 ml


 


Output Total 300 ml


 


Balance 50 ml


 


 


 


Intake Oral 350 ml


 


Output Urine Total 300 ml











Physical Exam


General:  Alert, Oriented X3, Cooperative, No acute distress, Other (facial 

swelling improved)


MUSCULOSKELETAL:  Other (HANNA)


Neuro:  Normal speech


Skin:  Other (dressing changed per RN this morning, some yellow serous drainage 

noted)





Plan


Plan of Care


ok to DC when Home health services are arranged, will need dressing changes 

daily and blood pressure checks, blood glucose management- has pump at home


wean steroids


continue antibiotics at home


follow up with ID in 2 weeks 


follow up with us in 2 weeks 


d/w RN





Comment


Review of Relevant


I have reviewed the following items susan (where applicable) has been applied.


Labs





Laboratory Tests








Test


 11/20/21


11:49 11/20/21


17:16 11/20/21


20:48 11/21/21


07:15


 


Glucose (Fingerstick)


 273 mg/dL


(70-99) 332 mg/dL


(70-99) 363 mg/dL


(70-99) 360 mg/dL


(70-99)


 


Test


 11/21/21


08:00 11/21/21


11:06 11/21/21


16:37 11/21/21


21:16


 


White Blood Count


 11.9 x10^3/uL


(4.0-11.0) 


 


 





 


Red Blood Count


 5.63 x10^6/uL


(4.30-5.70) 


 


 





 


Hemoglobin


 16.4 g/dL


(13.0-17.5) 


 


 





 


Hematocrit


 49.8 %


(39.0-53.0) 


 


 





 


Mean Corpuscular Volume 88 fL ()    


 


Mean Corpuscular Hemoglobin 29 pg (25-35)    


 


Mean Corpuscular Hemoglobin


Concent 33 g/dL


(31-37) 


 


 





 


Red Cell Distribution Width


 14.7 %


(11.5-14.5) 


 


 





 


Platelet Count


 247 x10^3/uL


(140-400) 


 


 





 


Neutrophils (%) (Auto) 88 % (31-73)    


 


Lymphocytes (%) (Auto) 9 % (24-48)    


 


Monocytes (%) (Auto) 4 % (0-9)    


 


Eosinophils (%) (Auto) 0 % (0-3)    


 


Basophils (%) (Auto) 0 % (0-3)    


 


Neutrophils # (Auto)


 10.4 x10^3/uL


(1.8-7.7) 


 


 





 


Lymphocytes # (Auto)


 1.0 x10^3/uL


(1.0-4.8) 


 


 





 


Monocytes # (Auto)


 0.4 x10^3/uL


(0.0-1.1) 


 


 





 


Eosinophils # (Auto)


 0.0 x10^3/uL


(0.0-0.7) 


 


 





 


Basophils # (Auto)


 0.0 x10^3/uL


(0.0-0.2) 


 


 





 


Sodium Level


 135 mmol/L


(136-145) 


 


 





 


Potassium Level


 4.3 mmol/L


(3.5-5.1) 


 


 





 


Chloride Level


 95 mmol/L


() 


 


 





 


Carbon Dioxide Level


 33 mmol/L


(21-32) 


 


 





 


Anion Gap 7 (6-14)    


 


Blood Urea Nitrogen


 31 mg/dL


(8-26) 


 


 





 


Creatinine


 1.5 mg/dL


(0.7-1.3) 


 


 





 


Estimated GFR


(Cockcroft-Gault) 48.6 


 


 


 





 


Glucose Level


 281 mg/dL


(70-99) 


 


 





 


Calcium Level


 9.0 mg/dL


(8.5-10.1) 


 


 





 


Glucose (Fingerstick)


 


 341 mg/dL


(70-99) 319 mg/dL


(70-99) 368 mg/dL


(70-99)


 


Test


 11/22/21


07:01 11/22/21


07:45 


 





 


Glucose (Fingerstick)


 317 mg/dL


(70-99) 


 


 





 


White Blood Count


 


 9.8 x10^3/uL


(4.0-11.0) 


 





 


Red Blood Count


 


 5.69 x10^6/uL


(4.30-5.70) 


 





 


Hemoglobin


 


 16.6 g/dL


(13.0-17.5) 


 





 


Hematocrit


 


 50.1 %


(39.0-53.0) 


 





 


Mean Corpuscular Volume  88 fL ()   


 


Mean Corpuscular Hemoglobin  29 pg (25-35)   


 


Mean Corpuscular Hemoglobin


Concent 


 33 g/dL


(31-37) 


 





 


Red Cell Distribution Width


 


 14.4 %


(11.5-14.5) 


 





 


Platelet Count


 


 215 x10^3/uL


(140-400) 


 





 


Neutrophils (%) (Auto)  73 % (31-73)   


 


Lymphocytes (%) (Auto)  19 % (24-48)   


 


Monocytes (%) (Auto)  7 % (0-9)   


 


Eosinophils (%) (Auto)  1 % (0-3)   


 


Basophils (%) (Auto)  1 % (0-3)   


 


Neutrophils # (Auto)


 


 7.1 x10^3/uL


(1.8-7.7) 


 





 


Lymphocytes # (Auto)


 


 1.8 x10^3/uL


(1.0-4.8) 


 





 


Monocytes # (Auto)


 


 0.7 x10^3/uL


(0.0-1.1) 


 





 


Eosinophils # (Auto)


 


 0.1 x10^3/uL


(0.0-0.7) 


 





 


Basophils # (Auto)


 


 0.1 x10^3/uL


(0.0-0.2) 


 





 


Sodium Level


 


 136 mmol/L


(136-145) 


 





 


Potassium Level


 


 3.9 mmol/L


(3.5-5.1) 


 





 


Chloride Level


 


 97 mmol/L


() 


 





 


Carbon Dioxide Level


 


 34 mmol/L


(21-32) 


 





 


Anion Gap  5 (6-14)   


 


Blood Urea Nitrogen


 


 26 mg/dL


(8-26) 


 





 


Creatinine


 


 1.4 mg/dL


(0.7-1.3) 


 





 


Estimated GFR


(Cockcroft-Gault) 


 52.6 


 


 





 


Glucose Level


 


 257 mg/dL


(70-99) 


 





 


Calcium Level


 


 8.5 mg/dL


(8.5-10.1) 


 





 


C-Reactive Protein,


Quantitative 


 8.2 mg/L


(0-3.3) 


 











Laboratory Tests








Test


 11/21/21


11:06 11/21/21


16:37 11/21/21


21:16 11/22/21


07:01


 


Glucose (Fingerstick)


 341 mg/dL


(70-99) 319 mg/dL


(70-99) 368 mg/dL


(70-99) 317 mg/dL


(70-99)


 


Test


 11/22/21


07:45 


 


 





 


White Blood Count


 9.8 x10^3/uL


(4.0-11.0) 


 


 





 


Red Blood Count


 5.69 x10^6/uL


(4.30-5.70) 


 


 





 


Hemoglobin


 16.6 g/dL


(13.0-17.5) 


 


 





 


Hematocrit


 50.1 %


(39.0-53.0) 


 


 





 


Mean Corpuscular Volume 88 fL ()    


 


Mean Corpuscular Hemoglobin 29 pg (25-35)    


 


Mean Corpuscular Hemoglobin


Concent 33 g/dL


(31-37) 


 


 





 


Red Cell Distribution Width


 14.4 %


(11.5-14.5) 


 


 





 


Platelet Count


 215 x10^3/uL


(140-400) 


 


 





 


Neutrophils (%) (Auto) 73 % (31-73)    


 


Lymphocytes (%) (Auto) 19 % (24-48)    


 


Monocytes (%) (Auto) 7 % (0-9)    


 


Eosinophils (%) (Auto) 1 % (0-3)    


 


Basophils (%) (Auto) 1 % (0-3)    


 


Neutrophils # (Auto)


 7.1 x10^3/uL


(1.8-7.7) 


 


 





 


Lymphocytes # (Auto)


 1.8 x10^3/uL


(1.0-4.8) 


 


 





 


Monocytes # (Auto)


 0.7 x10^3/uL


(0.0-1.1) 


 


 





 


Eosinophils # (Auto)


 0.1 x10^3/uL


(0.0-0.7) 


 


 





 


Basophils # (Auto)


 0.1 x10^3/uL


(0.0-0.2) 


 


 





 


Sodium Level


 136 mmol/L


(136-145) 


 


 





 


Potassium Level


 3.9 mmol/L


(3.5-5.1) 


 


 





 


Chloride Level


 97 mmol/L


() 


 


 





 


Carbon Dioxide Level


 34 mmol/L


(21-32) 


 


 





 


Anion Gap 5 (6-14)    


 


Blood Urea Nitrogen


 26 mg/dL


(8-26) 


 


 





 


Creatinine


 1.4 mg/dL


(0.7-1.3) 


 


 





 


Estimated GFR


(Cockcroft-Gault) 52.6 


 


 


 





 


Glucose Level


 257 mg/dL


(70-99) 


 


 





 


Calcium Level


 8.5 mg/dL


(8.5-10.1) 


 


 





 


C-Reactive Protein,


Quantitative 8.2 mg/L


(0-3.3) 


 


 











Microbiology


11/16/21 Gram Stain - Final, Complete


           


11/16/21 Aerobic and Anaerobic Culture - Final, Complete


           


11/16/21 Antimicrobic Susceptibility - Final, Complete


Medications





Current Medications


Fentanyl Citrate (Fentanyl 2ml Vial) 50 mcg PRN Q2HR  PRN IVP PAIN Last 

administered on 11/17/21at 02:11;  Start 11/16/21 at 16:00;  Stop 11/17/21 at 

03:01;  Status DC


Aspirin (Aspirin Chewable) 81 mg DAILY PO  Last administered on 11/22/21at 

08:15;  Start 11/17/21 at 09:00


Clonidine HCl (Catapres Tts-2) 1 patch WEEKLY TD  Last administered on 

11/16/21at 16:26;  Start 11/16/21 at 17:00


Docusate Sodium (Colace) 100 mg BID PO  Last administered on 11/22/21at 08:14;  

Start 11/16/21 at 21:00


EZETIMIBE (Zetia) 10 mg DAILY PO  Last administered on 11/22/21at 08:15;  Start 

11/17/21 at 09:00


Hydralazine HCl (Apresoline) 50 mg DAILY PO  Last administered on 11/18/21at 

08:41;  Start 11/17/21 at 09:00;  Stop 11/18/21 at 14:47;  Status DC


Hydrochlorothiazide (Microzide) 12.5 mg DAILY PO  Last administered on 

11/22/21at 08:15;  Start 11/17/21 at 09:00


Acetaminophen/ Hydrocodone Bitart (Lortab 7.5/325) 2 tab PRN Q4HRS  PRN PO PAIN 

Last administered on 11/17/21at 10:25;  Start 11/16/21 at 16:00;  Stop 11/17/21 

at 13:34;  Status DC


Lisinopril (Prinivil) 20 mg DAILY PO ;  Start 11/17/21 at 09:00;  Stop 11/17/21 

at 06:31;  Status DC


Methocarbamol (Robaxin) 750 mg TID PRN  PRN PO MUSCLE SPASMS Last administered 

on 11/21/21at 02:45;  Start 11/16/21 at 16:00


Atorvastatin Calcium (Lipitor) 80 mg DAILY PO  Last administered on 11/22/21at 

08:16;  Start 11/17/21 at 09:00


Non-Formulary Medication (Empagliflozin (Jardiance)) 25 mg DAILY PO ;  Start 

11/17/21 at 09:00;  Stop 11/17/21 at 18:17;  Status DC


Febuxostat (Uloric) 80 mg DAILY PO  Last administered on 11/22/21at 08:15;  

Start 11/17/21 at 09:00


Non-Formulary Medication (Icosapent Ethyl (Vascepa)) 1 gm BID PO ;  Start 

11/16/21 at 21:00;  Stop 11/17/21 at 18:18;  Status DC


Metoprolol Tartrate (Lopressor) 50 mg BID PO  Last administered on 11/22/21at 

08:17;  Start 11/16/21 at 21:00


Probenecid (Benemid) 500 mg BID PO  Last administered on 11/22/21at 08:17;  

Start 11/16/21 at 21:00


Non-Formulary Medication (Semaglutide (Ozempic)) 1 mg WEEKLY SQ ;  Start 

11/23/21 at 09:00;  Status UNV


Zolpidem Tartrate (Ambien) 5 mg PRN QHS  PRN PO INSOMNIA, MAY REPEAT X1 Last 

administered on 11/17/21at 21:00;  Start 11/16/21 at 16:15


Insulin Human Lispro (HumaLOG) 0-9 UNITS TIDWMEALS SQ  Last administered on 

11/22/21at 08:32;  Start 11/16/21 at 00:00


Dextrose (Dextrose 50%-Water Syringe) 12.5 gm PRN Q15MIN  PRN IV SEE COMMENTS;  

Start 11/16/21 at 20:45


Diphenhydramine HCl (Benadryl) 50 mg PRN Q6HRS  PRN IVP ITCHING, 1ST CHOICE Last

administered on 11/21/21at 21:20;  Start 11/17/21 at 03:00


Diphenhydramine HCl (Benadryl) 100 mg PRN Q6HRS  PRN IVP ITCHING, 2ND CHOICE;  

Start 11/17/21 at 03:00


Morphine Sulfate (Morphine Sulfate) 2 mg PRN Q2HR  PRN IVP PAIN Last 

administered on 11/21/21at 17:06;  Start 11/17/21 at 03:00


Famotidine (Pepcid Vial) 20 mg BID IVP  Last administered on 11/21/21at 21:08;  

Start 11/17/21 at 07:00


Methylprednisolone Sodium Succinate (SOLU-Medrol 40MG VIAL) 40 mg Q12HR IV  Last

administered on 11/19/21at 08:02;  Start 11/17/21 at 07:00;  Stop 11/19/21 at 

09:43;  Status DC


Daptomycin 640 mg/ Sodium Chloride 50 ml @  100 mls/hr Q24H IV  Last 

administered on 11/17/21at 11:01;  Start 11/17/21 at 10:00;  Stop 11/17/21 at 

14:59;  Status DC


Piperacillin Sod/ Tazobactam Sod 3.375 gm/Sodium Chloride 50 ml @  100 mls/hr 

Q6HRS IV  Last administered on 11/19/21at 06:16;  Start 11/17/21 at 10:00;  Stop

11/19/21 at 08:23;  Status DC


Lactobacillus Rhamnosus (Culturelle) 1 cap BID PO  Last administered on 

11/22/21at 08:16;  Start 11/17/21 at 21:00


Oxycodone/ Acetaminophen (Percocet 5/325) 1 tab PRN Q4HRS  PRN PO MODERATE PAIN;

 Start 11/17/21 at 13:45


Oxycodone/ Acetaminophen (Percocet 5/325) 2 tab PRN Q4HRS  PRN PO SEVERE PAIN 

Last administered on 11/22/21at 08:14;  Start 11/17/21 at 13:45


Daptomycin 500 mg/ Sodium Chloride 50 ml @  100 mls/hr Q24H IV  Last 

administered on 11/20/21at 09:03;  Start 11/18/21 at 09:00;  Stop 11/20/21 at 

12:51;  Status DC


Labetalol HCl (Normodyne Iv Push) 10 mg PRN Q15MIN  PRN IVP HYPERTENSION Last 

administered on 11/22/21at 08:18;  Start 11/18/21 at 00:00


Ziprasidone (Geodon) 20 mg PRN Q2HRS  PRN PO ANXIETY / AGITATION Last 

administered on 11/19/21at 10:10;  Start 11/18/21 at 00:00


Hydralazine HCl (Apresoline) 25 mg TID PO  Last administered on 11/19/21at 

08:01;  Start 11/18/21 at 00:30;  Stop 11/19/21 at 09:37;  Status DC


Insulin Human Lispro (HumaLOG) 5 units 1X  ONCE SQ  Last administered on 1 1/18/21at 00:19;  Start 11/18/21 at 00:30;  Stop 11/18/21 at 00:31;  Status DC


Ziprasidone (Geodon Im) 10 mg PRN Q2HRS  PRN IM AGITATION Last administered on 

11/18/21at 06:05;  Start 11/18/21 at 00:15


Lidocaine HCl (Buffered Lidocaine 1%) 3 ml STK-MED ONCE .ROUTE ;  Start 11/18/21

at 12:11;  Stop 11/18/21 at 12:11;  Status DC


Lidocaine HCl (Buffered Lidocaine 1%) 3 ml STK-MED ONCE .ROUTE ;  Start 11/18/21

at 12:21;  Stop 11/18/21 at 12:22;  Status DC


Lidocaine HCl (Buffered Lidocaine 1%) 6 ml 1X  ONCE INJ  Last administered on 

11/18/21at 13:10;  Start 11/18/21 at 12:30;  Stop 11/18/21 at 12:34;  Status DC


Gadoterate Meglumine (Clariscan) 20 ml 1X  ONCE IVP  Last administered on 

11/18/21at 15:55;  Start 11/18/21 at 15:30;  Stop 11/18/21 at 15:31;  Status DC


Insulin Human Lispro (HumaLOG) 12 units 1X  ONCE SQ  Last administered on 

11/18/21at 22:05;  Start 11/18/21 at 22:00;  Stop 11/18/21 at 22:01;  Status DC


Hydralazine HCl (Apresoline) 50 mg TID PO  Last administered on 11/22/21at 

08:16;  Start 11/19/21 at 14:00


Insulin Glargine (Lantus Syringe) 10 unit BID SQ  Last administered on 

11/20/21at 23:06;  Start 11/19/21 at 11:00;  Stop 11/21/21 at 08:13;  Status DC


Prednisone (Prednisone) 40 mg DAILY PO  Last administered on 11/20/21at 08:31;  

Start 11/20/21 at 09:00;  Stop 11/20/21 at 09:15;  Status DC


Insulin Human Lispro (HumaLOG) 5 units TIDWMEALS SQ  Last administered on 

11/20/21at 17:00;  Start 11/19/21 at 17:00;  Stop 11/21/21 at 08:13;  Status DC


Polyethylene Glycol (miraLAX PACKET) 17 gm PRN Q4HRS  PRN PO CONSTIPATION Last 

administered on 11/21/21at 09:40;  Start 11/19/21 at 16:15


Insulin Human Lispro (HumaLOG) 20 units 1X  ONCE SQ  Last administered on 

11/19/21at 18:18;  Start 11/19/21 at 18:00;  Stop 11/19/21 at 18:03;  Status DC


Prednisone (Prednisone) 20 mg DAILY PO  Last administered on 11/22/21at 08:15;  

Start 11/21/21 at 09:00


Amlodipine Besylate (Norvasc) 5 mg DAILY PO  Last administered on 11/21/21at 

07:39;  Start 11/20/21 at 09:15;  Stop 11/21/21 at 08:12;  Status DC


Cefazolin Sodium/ Dextrose 50 ml @  100 mls/hr Q8HRS IV  Last administered on 

11/22/21at 05:56;  Start 11/20/21 at 14:00


Methylprednisolone Sodium Succinate (SOLU-Medrol 125MG VIAL) 125 mg 1X  ONCE IV 

Last administered on 11/20/21at 14:59;  Start 11/20/21 at 14:30;  Stop 11/20/21 

at 14:31;  Status DC


Amlodipine Besylate (Norvasc) 10 mg DAILY PO  Last administered on 11/22/21at 

08:15;  Start 11/21/21 at 09:00


Insulin Glargine (Lantus Syringe) 15 unit BID SQ  Last administered on 

11/21/21at 21:15;  Start 11/21/21 at 09:00


Insulin Human Lispro (HumaLOG) 10 units TIDWMEALS SQ  Last administered on 

11/22/21at 08:34;  Start 11/21/21 at 12:00





Active Scripts


Active


Colace (Docusate Sodium) 100 Mg Capsule 100 Mg PO BID 60 Days


Methocarbamol 750 Mg Tablet 750 Mg PO TID PRN PRN


Reported


[V-Go 40 Day Kit ]   Units SQ Q1HR


     5 CLICKS @ MEALS


     2 CLICKS @ SNACKS


Freestyle Lite Test Strip (Blood Sugar Diagnostic) 1 Each Strip 1 Strip MC DAILY

30 Days


Hydrocodone-Apap 7.5-325  ** (Hydrocodone Bit/Acetaminophen) 1 Tab Tablet 1-2 

Tab PO PRN Q4HRS PRN


Vascepa (Icosapent Ethyl) 1 Gm Capsule 1 Gm PO BID


Zolpidem Tartrate Er (Zolpidem Tartrate) 12.5 Mg Tab.mphase 12.5 Mg PO PRN QHS 

PRN


Probenecid 500 Mg Tablet 500 Mg PO BID


Hydrochlorothiazide Capsule  ** (Hydrochlorothiazide) 12.5 Mg Capsule 12.5 Mg PO

DAILY


Hydralazine Hcl 50 Mg Tablet 1 Tab PO DAILY


Uloric (Febuxostat) 80 Mg Tablet 1 Tab PO DAILY 30 Days


Clonidine Tts-2  ** (Clonidine) 1 Each Patch.tdwk 1 Patch TD WEEKLY


Bystolic (Nebivolol Hcl) 20 Mg Tablet 20 Mg PO DAILY


Aspirin 81 Mg Tab.chew 1 Tab PO DAILY


Zetia (Ezetimibe) 10 Mg Tablet 10 Mg PO DAILY


Zestril (Lisinopril) 20 Mg Tablet 1 Tab PO DAILY 30 Days


Ozempic (Semaglutide) 1 Mg/0.75 Ml Pen.injctr 1 Mg SQ WEEKLY


     SATURDAY


Lipitor (Atorvastatin Calcium) 80 Mg Tablet 1 Tab PO DAILY


Jardiance (Empagliflozin) 25 Mg Tablet 25 Mg PO DAILY


Vitals/I & O





Vital Sign - Last 24 Hours








 11/21/21 11/21/21 11/21/21 11/21/21





 11:00 11:51 13:11 14:40


 


Temp 97.8   





 97.8   


 


Pulse 85  85 


 


Resp 18   


 


B/P (MAP) 162/102 (122)  162/102 


 


Pulse Ox 96   96


 


O2 Delivery Room Air Room Air  Room Air


 


    





    





 11/21/21 11/21/21 11/21/21 11/21/21





 15:00 17:06 17:45 19:00


 


Temp 98.1   97.3





 98.1   97.3


 


Pulse 87   91


 


Resp 18   18


 


B/P (MAP) 146/109 (121)   178/112 (134)


 


Pulse Ox 98 98 98 95


 


O2 Delivery Room Air Room Air Room Air Room Air


 


    





    





 11/21/21 11/21/21 11/21/21 11/21/21





 19:40 21:08 21:09 21:16


 


Pulse  91 91 


 


Resp    20


 


B/P (MAP)  178/112 178/112 


 


O2 Delivery Room Air   Room Air





 11/21/21 11/21/21 11/22/21 11/22/21





 21:46 22:59 01:02 02:54


 


Temp  98.0  98.0





  98.0  98.0


 


Pulse  86 82 85


 


Resp 20 21 19


 


B/P (MAP)  181/108 (132) 184/114 147/87 (107)


 


Pulse Ox  96  96


 


O2 Delivery Room Air Room Air  Room Air


 


    





    





 11/22/21 11/22/21 11/22/21 11/22/21





 07:15 08:15 08:16 08:17


 


Temp 97.9   





 97.9   


 


Pulse 81 81 81 81


 


Resp 20   


 


B/P (MAP) 175/87 (116) 175/87 175/87 175/87


 


Pulse Ox 96   


 


O2 Delivery Room Air   


 


    





    





 11/22/21 11/22/21 11/22/21 11/22/21





 08:18 08:22 08:29 08:47


 


Pulse 81 85 81 96


 


Resp    20


 


B/P (MAP) 175/87 158/97 (117) 181/118 (139) 168/115 (132)














Intake and Output   


 


 11/21/21 11/21/21 11/22/21





 15:00 23:00 07:00


 


Intake Total  200 ml 150 ml


 


Output Total   300 ml


 


Balance  200 ml -150 ml











Justifications for Admission


Other Justification














ALIZA CHRIS APRN          Nov 22, 2021 10:10

## 2021-11-22 NOTE — PDOC
Infectious Disease Note


Subjective:


Subjective


Patient feels better today


Has pain whenever he does PT and OT


Underwent dressing change earlier this morning





Vital Signs:


Vital Signs





Vital Signs








  Date Time  Temp Pulse Resp B/P (MAP) Pulse Ox O2 Delivery O2 Flow Rate FiO2


 


11/22/21 02:54 98.0 85 19 147/87 (107) 96 Room Air  





 98.0       











Physical Exam:


PHYSICAL EXAM


GENERAL:  Alert, oriented gentleman, not in distress.


VITAL SIGNS:  Stable. 


HEENT:  Both pupils are round and reacting.  No conjunctival lesion. No lesion 


in the mouth.


NECK:  Supple. No JVP. No lymphadenopathy.


LUNGS:  Clear.


HEART:  S1, S2, regular.


ABDOMEN:  Soft, nontender. No organomegaly.


EXTREMITIES:  No edema, cyanosis.


SKIN:  The patient does have some hives from fentanyl and lip swelling.


SPINE: There is incision with drainage present.


NEUROLOGIC:  The patient is alert, awake and appropriate. No focal neurologic 


deficit.





Medications:


Inpatient Meds:


Medications reviewed.





Labs:


Lab





Laboratory Tests








Test


 11/21/21


08:00 11/21/21


11:06 11/21/21


16:37 11/21/21


21:16


 


White Blood Count


 11.9 x10^3/uL


(4.0-11.0) 


 


 





 


Red Blood Count


 5.63 x10^6/uL


(4.30-5.70) 


 


 





 


Hemoglobin


 16.4 g/dL


(13.0-17.5) 


 


 





 


Hematocrit


 49.8 %


(39.0-53.0) 


 


 





 


Mean Corpuscular Volume 88 fL ()    


 


Mean Corpuscular Hemoglobin 29 pg (25-35)    


 


Mean Corpuscular Hemoglobin


Concent 33 g/dL


(31-37) 


 


 





 


Red Cell Distribution Width


 14.7 %


(11.5-14.5) 


 


 





 


Platelet Count


 247 x10^3/uL


(140-400) 


 


 





 


Neutrophils (%) (Auto) 88 % (31-73)    


 


Lymphocytes (%) (Auto) 9 % (24-48)    


 


Monocytes (%) (Auto) 4 % (0-9)    


 


Eosinophils (%) (Auto) 0 % (0-3)    


 


Basophils (%) (Auto) 0 % (0-3)    


 


Neutrophils # (Auto)


 10.4 x10^3/uL


(1.8-7.7) 


 


 





 


Lymphocytes # (Auto)


 1.0 x10^3/uL


(1.0-4.8) 


 


 





 


Monocytes # (Auto)


 0.4 x10^3/uL


(0.0-1.1) 


 


 





 


Eosinophils # (Auto)


 0.0 x10^3/uL


(0.0-0.7) 


 


 





 


Basophils # (Auto)


 0.0 x10^3/uL


(0.0-0.2) 


 


 





 


Sodium Level


 135 mmol/L


(136-145) 


 


 





 


Potassium Level


 4.3 mmol/L


(3.5-5.1) 


 


 





 


Chloride Level


 95 mmol/L


() 


 


 





 


Carbon Dioxide Level


 33 mmol/L


(21-32) 


 


 





 


Anion Gap 7 (6-14)    


 


Blood Urea Nitrogen


 31 mg/dL


(8-26) 


 


 





 


Creatinine


 1.5 mg/dL


(0.7-1.3) 


 


 





 


Estimated GFR


(Cockcroft-Gault) 48.6 


 


 


 





 


Glucose Level


 281 mg/dL


(70-99) 


 


 





 


Calcium Level


 9.0 mg/dL


(8.5-10.1) 


 


 





 


Glucose (Fingerstick)


 


 341 mg/dL


(70-99) 319 mg/dL


(70-99) 368 mg/dL


(70-99)


 


Test


 11/22/21


07:01 


 


 





 


Glucose (Fingerstick)


 317 mg/dL


(70-99) 


 


 














Objective:


Assessment:


1.  Postsurgical spine infection.  MSSA


2.  Status post right-sided hemilaminotomy and microdiscectomy L3-L4 done on 


11/03/2021.


3.  Diabetes mellitus.


4.  Hypertension.


5.  Leukocytosis.


6.  Renal insufficiency.





Plan:


Plan of Care


Continue  cefazolin 


Pain control per primary/neurosurgery


When ready for discharge patient will need cefazolin as outpatient


Prescription in chart


Social work to assist with discharge antibiotics


PICC  and complications discussed


Wound care per neurosurgery


Follow-up in [facility] clinic in 2 weeks Dec 7-9 [phone]


Discussed with KEVIN SALAZAR MD           Nov 22, 2021 07:28

## 2021-11-23 VITALS — SYSTOLIC BLOOD PRESSURE: 159 MMHG | DIASTOLIC BLOOD PRESSURE: 90 MMHG

## 2021-11-23 VITALS — SYSTOLIC BLOOD PRESSURE: 157 MMHG | DIASTOLIC BLOOD PRESSURE: 100 MMHG

## 2021-11-23 VITALS — DIASTOLIC BLOOD PRESSURE: 104 MMHG | SYSTOLIC BLOOD PRESSURE: 164 MMHG

## 2021-11-23 VITALS — DIASTOLIC BLOOD PRESSURE: 104 MMHG | SYSTOLIC BLOOD PRESSURE: 173 MMHG

## 2021-11-23 VITALS — DIASTOLIC BLOOD PRESSURE: 80 MMHG | SYSTOLIC BLOOD PRESSURE: 125 MMHG

## 2021-11-23 VITALS — DIASTOLIC BLOOD PRESSURE: 82 MMHG | SYSTOLIC BLOOD PRESSURE: 133 MMHG

## 2021-11-23 LAB
ANION GAP SERPL CALC-SCNC: 8 MMOL/L (ref 6–14)
BASOPHILS # BLD AUTO: 0.1 X10^3/UL (ref 0–0.2)
BASOPHILS NFR BLD: 0 % (ref 0–3)
BUN SERPL-MCNC: 25 MG/DL (ref 8–26)
CALCIUM SERPL-MCNC: 8.7 MG/DL (ref 8.5–10.1)
CHLORIDE SERPL-SCNC: 95 MMOL/L (ref 98–107)
CO2 SERPL-SCNC: 32 MMOL/L (ref 21–32)
CREAT SERPL-MCNC: 1.3 MG/DL (ref 0.7–1.3)
EOSINOPHIL NFR BLD: 0.2 X10^3/UL (ref 0–0.7)
EOSINOPHIL NFR BLD: 2 % (ref 0–3)
ERYTHROCYTE [DISTWIDTH] IN BLOOD BY AUTOMATED COUNT: 14.7 % (ref 11.5–14.5)
GFR SERPLBLD BASED ON 1.73 SQ M-ARVRAT: 57.3 ML/MIN
GLUCOSE SERPL-MCNC: 196 MG/DL (ref 70–99)
HCT VFR BLD CALC: 52.7 % (ref 39–53)
HGB BLD-MCNC: 17.3 G/DL (ref 13–17.5)
LYMPHOCYTES # BLD: 2.2 X10^3/UL (ref 1–4.8)
LYMPHOCYTES NFR BLD AUTO: 18 % (ref 24–48)
MCH RBC QN AUTO: 29 PG (ref 25–35)
MCHC RBC AUTO-ENTMCNC: 33 G/DL (ref 31–37)
MCV RBC AUTO: 88 FL (ref 79–100)
MONO #: 0.8 X10^3/UL (ref 0–1.1)
MONOCYTES NFR BLD: 6 % (ref 0–9)
NEUT #: 9.3 X10^3/UL (ref 1.8–7.7)
NEUTROPHILS NFR BLD AUTO: 74 % (ref 31–73)
PLATELET # BLD AUTO: 160 X10^3/UL (ref 140–400)
POTASSIUM SERPL-SCNC: 4 MMOL/L (ref 3.5–5.1)
RBC # BLD AUTO: 6 X10^6/UL (ref 4.3–5.7)
SODIUM SERPL-SCNC: 135 MMOL/L (ref 136–145)
WBC # BLD AUTO: 12.6 X10^3/UL (ref 4–11)

## 2021-11-23 RX ADMIN — INSULIN GLARGINE SCH UNIT: 100 INJECTION, SOLUTION SUBCUTANEOUS at 08:46

## 2021-11-23 RX ADMIN — ASPIRIN 81 MG SCH MG: 81 TABLET ORAL at 08:41

## 2021-11-23 RX ADMIN — FEBUXOSTAT SCH MG: 40 TABLET ORAL at 08:40

## 2021-11-23 RX ADMIN — INSULIN LISPRO SCH UNITS: 100 INJECTION, SOLUTION INTRAVENOUS; SUBCUTANEOUS at 08:50

## 2021-11-23 RX ADMIN — DAPTOMYCIN SCH MLS/HR: 500 INJECTION, POWDER, LYOPHILIZED, FOR SOLUTION INTRAVENOUS at 12:47

## 2021-11-23 RX ADMIN — FAMOTIDINE SCH MG: 20 TABLET ORAL at 20:02

## 2021-11-23 RX ADMIN — INSULIN LISPRO SCH UNITS: 100 INJECTION, SOLUTION INTRAVENOUS; SUBCUTANEOUS at 17:39

## 2021-11-23 RX ADMIN — INSULIN GLARGINE SCH UNIT: 100 INJECTION, SOLUTION SUBCUTANEOUS at 21:42

## 2021-11-23 RX ADMIN — METHOCARBAMOL PRN MG: 750 TABLET ORAL at 20:03

## 2021-11-23 RX ADMIN — MORPHINE SULFATE PRN MG: 2 INJECTION, SOLUTION INTRAMUSCULAR; INTRAVENOUS at 08:49

## 2021-11-23 RX ADMIN — MORPHINE SULFATE PRN MG: 2 INJECTION, SOLUTION INTRAMUSCULAR; INTRAVENOUS at 12:45

## 2021-11-23 RX ADMIN — CETIRIZINE HYDROCHLORIDE SCH MG: 10 TABLET, FILM COATED ORAL at 17:35

## 2021-11-23 RX ADMIN — FAMOTIDINE SCH MG: 20 TABLET ORAL at 08:42

## 2021-11-23 RX ADMIN — METOPROLOL TARTRATE SCH MG: 50 TABLET, FILM COATED ORAL at 20:02

## 2021-11-23 RX ADMIN — METHOCARBAMOL PRN MG: 750 TABLET ORAL at 12:44

## 2021-11-23 RX ADMIN — INSULIN LISPRO SCH UNITS: 100 INJECTION, SOLUTION INTRAVENOUS; SUBCUTANEOUS at 17:40

## 2021-11-23 RX ADMIN — METOPROLOL TARTRATE SCH MG: 50 TABLET, FILM COATED ORAL at 08:41

## 2021-11-23 RX ADMIN — PROBENECID SCH MG: 500 TABLET, FILM COATED ORAL at 08:42

## 2021-11-23 RX ADMIN — INSULIN LISPRO SCH UNITS: 100 INJECTION, SOLUTION INTRAVENOUS; SUBCUTANEOUS at 12:59

## 2021-11-23 RX ADMIN — CLONIDINE SCH PATCH: 0.2 PATCH TRANSDERMAL at 08:42

## 2021-11-23 RX ADMIN — DOCUSATE SODIUM SCH MG: 100 CAPSULE, LIQUID FILLED ORAL at 08:41

## 2021-11-23 RX ADMIN — INSULIN LISPRO SCH UNITS: 100 INJECTION, SOLUTION INTRAVENOUS; SUBCUTANEOUS at 08:51

## 2021-11-23 RX ADMIN — EZETIMIBE SCH MG: 10 TABLET ORAL at 08:40

## 2021-11-23 RX ADMIN — ATORVASTATIN CALCIUM SCH MG: 40 TABLET, FILM COATED ORAL at 08:40

## 2021-11-23 RX ADMIN — PROBENECID SCH MG: 500 TABLET, FILM COATED ORAL at 20:01

## 2021-11-23 RX ADMIN — Medication SCH CAP: at 20:01

## 2021-11-23 RX ADMIN — MORPHINE SULFATE PRN MG: 2 INJECTION, SOLUTION INTRAMUSCULAR; INTRAVENOUS at 11:50

## 2021-11-23 RX ADMIN — DOCUSATE SODIUM SCH MG: 100 CAPSULE, LIQUID FILLED ORAL at 20:04

## 2021-11-23 RX ADMIN — Medication SCH CAP: at 08:40

## 2021-11-23 NOTE — PDOC
Infectious Disease Note


Subjective:


Subjective


Patient complains of rash which started around the anterior neck and now has 

developed a large welt on the back of the neck


So has developed some puffiness around his eyes for which he took Benadryl with 

some relief


It has been itching


His back pain has been aggravated since yesterday


He denies any mucosal involvement


Denies any fever, chills, nausea, vomiting, diarrhea, abdominal pain, vision 

disturbance





Vital Signs:


Vital Signs





Vital Signs








  Date Time  Temp Pulse Resp B/P (MAP) Pulse Ox O2 Delivery O2 Flow Rate FiO2


 


11/23/21 07:00 98.0 75 16 173/104 (127) 97 Room Air  





 98.0       











Physical Exam:


PHYSICAL EXAM


GENERAL:  Alert, oriented gentleman, not in distress.


HEENT:  Both pupils are round and reacting.  No conjunctival lesion. No lesion 


in the mouth.  Some puffiness around the eyes


NECK:  Supple. No JVP. No lymphadenopathy.


LUNGS:  Clear.


HEART:  S1, S2, regular.


ABDOMEN:  Soft, nontender. No organomegaly.


EXTREMITIES:  No edema, cyanosis.


SKIN:  The patient does have some hives around his neck and anterior neck


SPINE: Incision with dressing intact, dry not taken down


NEUROLOGIC:  The patient is alert, awake and appropriate. No focal neurologic 


deficit.





Medications:


Inpatient Meds:


Medications reviewed.





Labs:


Lab





Laboratory Tests








Test


 11/22/21


11:35 11/22/21


17:24 11/22/21


21:30 11/23/21


05:30


 


Glucose (Fingerstick)


 452 mg/dL


(70-99) 428 mg/dL


(70-99) 276 mg/dL


(70-99) 





 


Sodium Level


 


 


 


 135 mmol/L


(136-145)


 


Potassium Level


 


 


 


 4.0 mmol/L


(3.5-5.1)


 


Chloride Level


 


 


 


 95 mmol/L


()


 


Carbon Dioxide Level


 


 


 


 32 mmol/L


(21-32)


 


Anion Gap    8 (6-14) 


 


Blood Urea Nitrogen


 


 


 


 25 mg/dL


(8-26)


 


Creatinine


 


 


 


 1.3 mg/dL


(0.7-1.3)


 


Estimated GFR


(Cockcroft-Gault) 


 


 


 57.3 





 


Glucose Level


 


 


 


 196 mg/dL


(70-99)


 


Calcium Level


 


 


 


 8.7 mg/dL


(8.5-10.1)


 


Test


 11/23/21


06:30 11/23/21


07:10 


 





 


White Blood Count


 12.6 x10^3/uL


(4.0-11.0) 


 


 





 


Red Blood Count


 6.00 x10^6/uL


(4.30-5.70) 


 


 





 


Hemoglobin


 17.3 g/dL


(13.0-17.5) 


 


 





 


Hematocrit


 52.7 %


(39.0-53.0) 


 


 





 


Mean Corpuscular Volume 88 fL ()    


 


Mean Corpuscular Hemoglobin 29 pg (25-35)    


 


Mean Corpuscular Hemoglobin


Concent 33 g/dL


(31-37) 


 


 





 


Red Cell Distribution Width


 14.7 %


(11.5-14.5) 


 


 





 


Platelet Count


 160 x10^3/uL


(140-400) 


 


 





 


Neutrophils (%) (Auto) 74 % (31-73)    


 


Lymphocytes (%) (Auto) 18 % (24-48)    


 


Monocytes (%) (Auto) 6 % (0-9)    


 


Eosinophils (%) (Auto) 2 % (0-3)    


 


Basophils (%) (Auto) 0 % (0-3)    


 


Neutrophils # (Auto)


 9.3 x10^3/uL


(1.8-7.7) 


 


 





 


Lymphocytes # (Auto)


 2.2 x10^3/uL


(1.0-4.8) 


 


 





 


Monocytes # (Auto)


 0.8 x10^3/uL


(0.0-1.1) 


 


 





 


Eosinophils # (Auto)


 0.2 x10^3/uL


(0.0-0.7) 


 


 





 


Basophils # (Auto)


 0.1 x10^3/uL


(0.0-0.2) 


 


 





 


Glucose (Fingerstick)


 


 202 mg/dL


(70-99) 


 














Objective:


Assessment:


1.  Postsurgical spine infection.  MSSA CRP 8.2, ESR 6


2.  Status post right-sided hemilaminotomy and microdiscectomy L3-L4 done on 


11/03/2021.


3.  Diabetes mellitus.


4.  Hypertension.


5.  Leukocytosis.


6.  Renal insufficiency.


7.  ACE inhibitor induced angioedema


8.  Dermatitis around the neck less likely from cefazolin





Plan:


Plan of Care


Will change cefazolin to daptomycin due though appears less likely from cefazol

in per my evaluation


Prednisone per primary


Pain control per primary/neurosurgery


When ready for discharge patient will need daptomycin as outpatient


Prescription in chart


Social work to assist with discharge antibiotics


PICC  and complications discussed


Wound care per neurosurgery


Follow-up in ID clinic in 2 weeks phone [phone]


Discussed with KEVIN SALAZAR MD           Nov 23, 2021 08:52

## 2021-11-23 NOTE — PDOC
PROGRESS NOTES


Date of Service


DATE: 11/23/21 


TIME: 12:01





Subjective


Subjective


 up ambulating in room


developed rash on neck and upper back


blood glucose improved


back pain, controlled with medication





Objective


Objective





Vital Signs








  Date Time  Temp Pulse Resp B/P (MAP) Pulse Ox O2 Delivery O2 Flow Rate FiO2


 


11/23/21 11:45  86  164/104    


 


11/23/21 11:00 98.2  16  95 Room Air  





 98.2       














Intake and Output 


 


 11/23/21





 07:00


 


Intake Total 400 ml


 


Output Total 1 ml


 


Balance 399 ml


 


 


 


Intake Oral 400 ml


 


Stool Total 1 ml


 


# Voids 2











Physical Exam


General:  Alert, Oriented X3, Cooperative, No acute distress


Neuro:  Normal speech


Skin:  Other (dressing recently changed, clear yellow drainge reported)





Plan


Plan of Care


antibiotics were changed due to rash, will monitor


continue wound care


activity as tolerated


hopefully dc with  tomorrow





Comment


Review of Relevant


I have reviewed the following items susan (where applicable) has been applied.


Labs





Laboratory Tests








Test


 11/21/21


16:37 11/21/21


21:16 11/22/21


07:01 11/22/21


07:45


 


Glucose (Fingerstick)


 319 mg/dL


(70-99) 368 mg/dL


(70-99) 317 mg/dL


(70-99) 





 


White Blood Count


 


 


 


 9.8 x10^3/uL


(4.0-11.0)


 


Red Blood Count


 


 


 


 5.69 x10^6/uL


(4.30-5.70)


 


Hemoglobin


 


 


 


 16.6 g/dL


(13.0-17.5)


 


Hematocrit


 


 


 


 50.1 %


(39.0-53.0)


 


Mean Corpuscular Volume    88 fL () 


 


Mean Corpuscular Hemoglobin    29 pg (25-35) 


 


Mean Corpuscular Hemoglobin


Concent 


 


 


 33 g/dL


(31-37)


 


Red Cell Distribution Width


 


 


 


 14.4 %


(11.5-14.5)


 


Platelet Count


 


 


 


 215 x10^3/uL


(140-400)


 


Neutrophils (%) (Auto)    73 % (31-73) 


 


Lymphocytes (%) (Auto)    19 % (24-48) 


 


Monocytes (%) (Auto)    7 % (0-9) 


 


Eosinophils (%) (Auto)    1 % (0-3) 


 


Basophils (%) (Auto)    1 % (0-3) 


 


Neutrophils # (Auto)


 


 


 


 7.1 x10^3/uL


(1.8-7.7)


 


Lymphocytes # (Auto)


 


 


 


 1.8 x10^3/uL


(1.0-4.8)


 


Monocytes # (Auto)


 


 


 


 0.7 x10^3/uL


(0.0-1.1)


 


Eosinophils # (Auto)


 


 


 


 0.1 x10^3/uL


(0.0-0.7)


 


Basophils # (Auto)


 


 


 


 0.1 x10^3/uL


(0.0-0.2)


 


Erythrocyte Sedimentation Rate    6 (0-15) 


 


Sodium Level


 


 


 


 136 mmol/L


(136-145)


 


Potassium Level


 


 


 


 3.9 mmol/L


(3.5-5.1)


 


Chloride Level


 


 


 


 97 mmol/L


()


 


Carbon Dioxide Level


 


 


 


 34 mmol/L


(21-32)


 


Anion Gap    5 (6-14) 


 


Blood Urea Nitrogen


 


 


 


 26 mg/dL


(8-26)


 


Creatinine


 


 


 


 1.4 mg/dL


(0.7-1.3)


 


Estimated GFR


(Cockcroft-Gault) 


 


 


 52.6 





 


Glucose Level


 


 


 


 257 mg/dL


(70-99)


 


Calcium Level


 


 


 


 8.5 mg/dL


(8.5-10.1)


 


C-Reactive Protein,


Quantitative 


 


 


 8.2 mg/L


(0-3.3)


 


Test


 11/22/21


11:35 11/22/21


17:24 11/22/21


21:30 11/23/21


05:30


 


Glucose (Fingerstick)


 452 mg/dL


(70-99) 428 mg/dL


(70-99) 276 mg/dL


(70-99) 





 


Sodium Level


 


 


 


 135 mmol/L


(136-145)


 


Potassium Level


 


 


 


 4.0 mmol/L


(3.5-5.1)


 


Chloride Level


 


 


 


 95 mmol/L


()


 


Carbon Dioxide Level


 


 


 


 32 mmol/L


(21-32)


 


Anion Gap    8 (6-14) 


 


Blood Urea Nitrogen


 


 


 


 25 mg/dL


(8-26)


 


Creatinine


 


 


 


 1.3 mg/dL


(0.7-1.3)


 


Estimated GFR


(Cockcroft-Gault) 


 


 


 57.3 





 


Glucose Level


 


 


 


 196 mg/dL


(70-99)


 


Calcium Level


 


 


 


 8.7 mg/dL


(8.5-10.1)


 


Test


 11/23/21


06:30 11/23/21


07:10 11/23/21


11:25 





 


White Blood Count


 12.6 x10^3/uL


(4.0-11.0) 


 


 





 


Red Blood Count


 6.00 x10^6/uL


(4.30-5.70) 


 


 





 


Hemoglobin


 17.3 g/dL


(13.0-17.5) 


 


 





 


Hematocrit


 52.7 %


(39.0-53.0) 


 


 





 


Mean Corpuscular Volume 88 fL ()    


 


Mean Corpuscular Hemoglobin 29 pg (25-35)    


 


Mean Corpuscular Hemoglobin


Concent 33 g/dL


(31-37) 


 


 





 


Red Cell Distribution Width


 14.7 %


(11.5-14.5) 


 


 





 


Platelet Count


 160 x10^3/uL


(140-400) 


 


 





 


Neutrophils (%) (Auto) 74 % (31-73)    


 


Lymphocytes (%) (Auto) 18 % (24-48)    


 


Monocytes (%) (Auto) 6 % (0-9)    


 


Eosinophils (%) (Auto) 2 % (0-3)    


 


Basophils (%) (Auto) 0 % (0-3)    


 


Neutrophils # (Auto)


 9.3 x10^3/uL


(1.8-7.7) 


 


 





 


Lymphocytes # (Auto)


 2.2 x10^3/uL


(1.0-4.8) 


 


 





 


Monocytes # (Auto)


 0.8 x10^3/uL


(0.0-1.1) 


 


 





 


Eosinophils # (Auto)


 0.2 x10^3/uL


(0.0-0.7) 


 


 





 


Basophils # (Auto)


 0.1 x10^3/uL


(0.0-0.2) 


 


 





 


Glucose (Fingerstick)


 


 202 mg/dL


(70-99) 206 mg/dL


(70-99) 











Laboratory Tests








Test


 11/22/21


17:24 11/22/21


21:30 11/23/21


05:30 11/23/21


06:30


 


Glucose (Fingerstick)


 428 mg/dL


(70-99) 276 mg/dL


(70-99) 


 





 


Sodium Level


 


 


 135 mmol/L


(136-145) 





 


Potassium Level


 


 


 4.0 mmol/L


(3.5-5.1) 





 


Chloride Level


 


 


 95 mmol/L


() 





 


Carbon Dioxide Level


 


 


 32 mmol/L


(21-32) 





 


Anion Gap   8 (6-14)  


 


Blood Urea Nitrogen


 


 


 25 mg/dL


(8-26) 





 


Creatinine


 


 


 1.3 mg/dL


(0.7-1.3) 





 


Estimated GFR


(Cockcroft-Gault) 


 


 57.3 


 





 


Glucose Level


 


 


 196 mg/dL


(70-99) 





 


Calcium Level


 


 


 8.7 mg/dL


(8.5-10.1) 





 


White Blood Count


 


 


 


 12.6 x10^3/uL


(4.0-11.0)


 


Red Blood Count


 


 


 


 6.00 x10^6/uL


(4.30-5.70)


 


Hemoglobin


 


 


 


 17.3 g/dL


(13.0-17.5)


 


Hematocrit


 


 


 


 52.7 %


(39.0-53.0)


 


Mean Corpuscular Volume    88 fL () 


 


Mean Corpuscular Hemoglobin    29 pg (25-35) 


 


Mean Corpuscular Hemoglobin


Concent 


 


 


 33 g/dL


(31-37)


 


Red Cell Distribution Width


 


 


 


 14.7 %


(11.5-14.5)


 


Platelet Count


 


 


 


 160 x10^3/uL


(140-400)


 


Neutrophils (%) (Auto)    74 % (31-73) 


 


Lymphocytes (%) (Auto)    18 % (24-48) 


 


Monocytes (%) (Auto)    6 % (0-9) 


 


Eosinophils (%) (Auto)    2 % (0-3) 


 


Basophils (%) (Auto)    0 % (0-3) 


 


Neutrophils # (Auto)


 


 


 


 9.3 x10^3/uL


(1.8-7.7)


 


Lymphocytes # (Auto)


 


 


 


 2.2 x10^3/uL


(1.0-4.8)


 


Monocytes # (Auto)


 


 


 


 0.8 x10^3/uL


(0.0-1.1)


 


Eosinophils # (Auto)


 


 


 


 0.2 x10^3/uL


(0.0-0.7)


 


Basophils # (Auto)


 


 


 


 0.1 x10^3/uL


(0.0-0.2)


 


Test


 11/23/21


07:10 11/23/21


11:25 


 





 


Glucose (Fingerstick)


 202 mg/dL


(70-99) 206 mg/dL


(70-99) 


 











Microbiology


11/16/21 Gram Stain - Final, Complete


           


11/16/21 Aerobic and Anaerobic Culture - Final, Complete


           


11/16/21 Antimicrobic Susceptibility - Final, Complete


Medications





Current Medications


Fentanyl Citrate (Fentanyl 2ml Vial) 50 mcg PRN Q2HR  PRN IVP PAIN Last 

administered on 11/17/21at 02:11;  Start 11/16/21 at 16:00;  Stop 11/17/21 at 

03:01;  Status DC


Aspirin (Aspirin Chewable) 81 mg DAILY PO  Last administered on 11/23/21at 

08:41;  Start 11/17/21 at 09:00


Clonidine HCl (Catapres Tts-2) 1 patch WEEKLY TD  Last administered on 

11/23/21at 08:42;  Start 11/16/21 at 17:00


Docusate Sodium (Colace) 100 mg BID PO  Last administered on 11/23/21at 08:41;  

Start 11/16/21 at 21:00


EZETIMIBE (Zetia) 10 mg DAILY PO  Last administered on 11/23/21at 08:40;  Start 

11/17/21 at 09:00


Hydralazine HCl (Apresoline) 50 mg DAILY PO  Last administered on 11/18/21at 

08:41;  Start 11/17/21 at 09:00;  Stop 11/18/21 at 14:47;  Status DC


Hydrochlorothiazide (Microzide) 12.5 mg DAILY PO  Last administered on 

11/23/21at 08:41;  Start 11/17/21 at 09:00


Acetaminophen/ Hydrocodone Bitart (Lortab 7.5/325) 2 tab PRN Q4HRS  PRN PO PAIN 

Last administered on 11/17/21at 10:25;  Start 11/16/21 at 16:00;  Stop 11/17/21 

at 13:34;  Status DC


Lisinopril (Prinivil) 20 mg DAILY PO ;  Start 11/17/21 at 09:00;  Stop 11/17/21 

at 06:31;  Status DC


Methocarbamol (Robaxin) 750 mg TID PRN  PRN PO MUSCLE SPASMS Last administered 

on 11/22/21at 17:58;  Start 11/16/21 at 16:00


Atorvastatin Calcium (Lipitor) 80 mg DAILY PO  Last administered on 11/23/21at 

08:40;  Start 11/17/21 at 09:00


Non-Formulary Medication (Empagliflozin (Jardiance)) 25 mg DAILY PO ;  Start 

11/17/21 at 09:00;  Stop 11/17/21 at 18:17;  Status DC


Febuxostat (Uloric) 80 mg DAILY PO  Last administered on 11/23/21at 08:40;  

Start 11/17/21 at 09:00


Non-Formulary Medication (Icosapent Ethyl (Vascepa)) 1 gm BID PO ;  Start 

11/16/21 at 21:00;  Stop 11/17/21 at 18:18;  Status DC


Metoprolol Tartrate (Lopressor) 50 mg BID PO  Last administered on 11/23/21at 

08:41;  Start 11/16/21 at 21:00


Probenecid (Benemid) 500 mg BID PO  Last administered on 11/23/21at 08:42;  

Start 11/16/21 at 21:00


Non-Formulary Medication (Semaglutide (Ozempic)) 1 mg WEEKLY SQ ;  Start 

11/23/21 at 09:00;  Status UNV


Zolpidem Tartrate (Ambien) 5 mg PRN QHS  PRN PO INSOMNIA, MAY REPEAT X1 Last 

administered on 11/17/21at 21:00;  Start 11/16/21 at 16:15


Insulin Human Lispro (HumaLOG) 0-9 UNITS TIDWMEALS SQ  Last administered on 11/2

3/21at 08:50;  Start 11/16/21 at 00:00


Dextrose (Dextrose 50%-Water Syringe) 12.5 gm PRN Q15MIN  PRN IV SEE COMMENTS;  

Start 11/16/21 at 20:45


Diphenhydramine HCl (Benadryl) 50 mg PRN Q6HRS  PRN IVP ITCHING, 1ST CHOICE Last

administered on 11/23/21at 08:39;  Start 11/17/21 at 03:00


Diphenhydramine HCl (Benadryl) 100 mg PRN Q6HRS  PRN IVP ITCHING, 2ND CHOICE;  

Start 11/17/21 at 03:00


Morphine Sulfate (Morphine Sulfate) 2 mg PRN Q2HR  PRN IVP PAIN Last 

administered on 11/23/21at 08:49;  Start 11/17/21 at 03:00


Famotidine (Pepcid Vial) 20 mg BID IVP  Last administered on 11/22/21at 09:00;  

Start 11/17/21 at 07:00;  Stop 11/22/21 at 12:26;  Status DC


Methylprednisolone Sodium Succinate (SOLU-Medrol 40MG VIAL) 40 mg Q12HR IV  Last

administered on 11/19/21at 08:02;  Start 11/17/21 at 07:00;  Stop 11/19/21 at 

09:43;  Status DC


Daptomycin 640 mg/ Sodium Chloride 50 ml @  100 mls/hr Q24H IV  Last 

administered on 11/17/21at 11:01;  Start 11/17/21 at 10:00;  Stop 11/17/21 at 

14:59;  Status DC


Piperacillin Sod/ Tazobactam Sod 3.375 gm/Sodium Chloride 50 ml @  100 mls/hr 

Q6HRS IV  Last administered on 11/19/21at 06:16;  Start 11/17/21 at 10:00;  Stop

11/19/21 at 08:23;  Status DC


Lactobacillus Rhamnosus (Culturelle) 1 cap BID PO  Last administered on 

11/23/21at 08:40;  Start 11/17/21 at 21:00


Oxycodone/ Acetaminophen (Percocet 5/325) 1 tab PRN Q4HRS  PRN PO MODERATE PAIN;

 Start 11/17/21 at 13:45;  Stop 11/22/21 at 10:13;  Status DC


Oxycodone/ Acetaminophen (Percocet 5/325) 2 tab PRN Q4HRS  PRN PO SEVERE PAIN 

Last administered on 11/22/21at 08:14;  Start 11/17/21 at 13:45;  Stop 11/22/21 

at 10:13;  Status DC


Daptomycin 500 mg/ Sodium Chloride 50 ml @  100 mls/hr Q24H IV  Last 

administered on 11/20/21at 09:03;  Start 11/18/21 at 09:00;  Stop 11/20/21 at 

12:51;  Status DC


Labetalol HCl (Normodyne Iv Push) 10 mg PRN Q15MIN  PRN IVP HYPERTENSION Last 

administered on 11/22/21at 23:39;  Start 11/18/21 at 00:00


Ziprasidone (Geodon) 20 mg PRN Q2HRS  PRN PO ANXIETY / AGITATION Last 

administered on 11/19/21at 10:10;  Start 11/18/21 at 00:00


Hydralazine HCl (Apresoline) 25 mg TID PO  Last administered on 11/19/21at 

08:01;  Start 11/18/21 at 00:30;  Stop 11/19/21 at 09:37;  Status DC


Insulin Human Lispro (HumaLOG) 5 units 1X  ONCE SQ  Last administered on 

11/18/21at 00:19;  Start 11/18/21 at 00:30;  Stop 11/18/21 at 00:31;  Status DC


Ziprasidone (Geodon Im) 10 mg PRN Q2HRS  PRN IM AGITATION Last administered on 

11/18/21at 06:05;  Start 11/18/21 at 00:15


Lidocaine HCl (Buffered Lidocaine 1%) 3 ml STK-MED ONCE .ROUTE ;  Start 11/18/21

at 12:11;  Stop 11/18/21 at 12:11;  Status DC


Lidocaine HCl (Buffered Lidocaine 1%) 3 ml STK-MED ONCE .ROUTE ;  Start 11/18/21

at 12:21;  Stop 11/18/21 at 12:22;  Status DC


Lidocaine HCl (Buffered Lidocaine 1%) 6 ml 1X  ONCE INJ  Last administered on 

11/18/21at 13:10;  Start 11/18/21 at 12:30;  Stop 11/18/21 at 12:34;  Status DC


Gadoterate Meglumine (Clariscan) 20 ml 1X  ONCE IVP  Last administered on 

11/18/21at 15:55;  Start 11/18/21 at 15:30;  Stop 11/18/21 at 15:31;  Status DC


Insulin Human Lispro (HumaLOG) 12 units 1X  ONCE SQ  Last administered on 

11/18/21at 22:05;  Start 11/18/21 at 22:00;  Stop 11/18/21 at 22:01;  Status DC


Hydralazine HCl (Apresoline) 50 mg TID PO  Last administered on 11/23/21at 

08:42;  Start 11/19/21 at 14:00;  Stop 11/23/21 at 11:34;  Status DC


Insulin Glargine (Lantus Syringe) 10 unit BID SQ  Last administered on 

11/20/21at 23:06;  Start 11/19/21 at 11:00;  Stop 11/21/21 at 08:13;  Status DC


Prednisone (Prednisone) 40 mg DAILY PO  Last administered on 11/20/21at 08:31;  

Start 11/20/21 at 09:00;  Stop 11/20/21 at 09:15;  Status DC


Insulin Human Lispro (HumaLOG) 5 units TIDWMEALS SQ  Last administered on 

11/20/21at 17:00;  Start 11/19/21 at 17:00;  Stop 11/21/21 at 08:13;  Status DC


Polyethylene Glycol (miraLAX PACKET) 17 gm PRN Q4HRS  PRN PO CONSTIPATION Last 

administered on 11/21/21at 09:40;  Start 11/19/21 at 16:15


Insulin Human Lispro (HumaLOG) 20 units 1X  ONCE SQ  Last administered on 

11/19/21at 18:18;  Start 11/19/21 at 18:00;  Stop 11/19/21 at 18:03;  Status DC


Prednisone (Prednisone) 20 mg DAILY PO  Last administered on 11/23/21at 08:40;  

Start 11/21/21 at 09:00;  Stop 11/23/21 at 11:34;  Status DC


Amlodipine Besylate (Norvasc) 5 mg DAILY PO  Last administered on 11/21/21at 

07:39;  Start 11/20/21 at 09:15;  Stop 11/21/21 at 08:12;  Status DC


Cefazolin Sodium/ Dextrose 50 ml @  100 mls/hr Q8HRS IV  Last administered on 

11/23/21at 05:04;  Start 11/20/21 at 14:00;  Stop 11/23/21 at 10:36;  Status DC


Methylprednisolone Sodium Succinate (SOLU-Medrol 125MG VIAL) 125 mg 1X  ONCE IV 

Last administered on 11/20/21at 14:59;  Start 11/20/21 at 14:30;  Stop 11/20/21 

at 14:31;  Status DC


Amlodipine Besylate (Norvasc) 10 mg DAILY PO  Last administered on 11/22/21at 

08:15;  Start 11/21/21 at 09:00;  Stop 11/22/21 at 12:23;  Status DC


Insulin Glargine (Lantus Syringe) 15 unit BID SQ  Last administered on 

11/22/21at 10:20;  Start 11/21/21 at 09:00;  Stop 11/22/21 at 12:33;  Status DC


Insulin Human Lispro (HumaLOG) 10 units TIDWMEALS SQ  Last administered on 

11/22/21at 12:41;  Start 11/21/21 at 12:00;  Stop 11/22/21 at 17:53;  Status DC


Oxycodone HCl (Roxicodone) 5 mg PRN Q4HRS  PRN PO PAIN;  Start 11/22/21 at 10:15


Oxycodone HCl (Roxicodone) 10 mg PRN Q4HRS  PRN PO PAIN Last administered on 

11/23/21at 05:04;  Start 11/22/21 at 10:30


Amlodipine Besylate (Norvasc) 10 mg DAILY PO  Last administered on 11/23/21at 

08:40;  Start 11/23/21 at 09:00


Famotidine (Pepcid) 20 mg BID PO  Last administered on 11/23/21at 08:42;  Start 

11/22/21 at 21:00


Insulin Glargine (Lantus Syringe) 20 unit BID SQ  Last administered on 

11/23/21at 08:46;  Start 11/22/21 at 21:00


Insulin Human Regular (HumuLIN R VIAL) 15 unit 1X  ONCE IV ;  Start 11/22/21 at 

13:00;  Stop 11/22/21 at 13:01;  Status DC


Insulin Human Lispro (HumaLOG) 15 units 1X  ONCE SQ ;  Start 11/22/21 at 13:00; 

Stop 11/22/21 at 13:01;  Status DC


Insulin Human Lispro (HumaLOG) 20 units TIDWMEALS SQ  Last administered on 

11/23/21at 08:51;  Start 11/22/21 at 18:00


Daptomycin 640 mg/ Sodium Chloride 50 ml @  100 mls/hr Q24H IV ;  Start 11/23/21

at 12:00


Hydralazine HCl (Apresoline) 75 mg TID PO ;  Start 11/23/21 at 11:45





Active Scripts


Active


Colace (Docusate Sodium) 100 Mg Capsule 100 Mg PO BID 60 Days


Methocarbamol 750 Mg Tablet 750 Mg PO TID PRN PRN


Reported


[V-Go 40 Day Kit ]   Units SQ Q1HR


     5 CLICKS @ MEALS


     2 CLICKS @ SNACKS


Freestyle Lite Test Strip (Blood Sugar Diagnostic) 1 Each Strip 1 Strip MC DAILY

30 Days


Hydrocodone-Apap 7.5-325  ** (Hydrocodone Bit/Acetaminophen) 1 Tab Tablet 1-2 

Tab PO PRN Q4HRS PRN


Vascepa (Icosapent Ethyl) 1 Gm Capsule 1 Gm PO BID


Zolpidem Tartrate Er (Zolpidem Tartrate) 12.5 Mg Tab.mphase 12.5 Mg PO PRN QHS 

PRN


Probenecid 500 Mg Tablet 500 Mg PO BID


Hydrochlorothiazide Capsule  ** (Hydrochlorothiazide) 12.5 Mg Capsule 12.5 Mg PO

DAILY


Hydralazine Hcl 50 Mg Tablet 1 Tab PO DAILY


Uloric (Febuxostat) 80 Mg Tablet 1 Tab PO DAILY 30 Days


Clonidine Tts-2  ** (Clonidine) 1 Each Patch.tdwk 1 Patch TD WEEKLY


Bystolic (Nebivolol Hcl) 20 Mg Tablet 20 Mg PO DAILY


Aspirin 81 Mg Tab.chew 1 Tab PO DAILY


Zetia (Ezetimibe) 10 Mg Tablet 10 Mg PO DAILY


Zestril (Lisinopril) 20 Mg Tablet 1 Tab PO DAILY 30 Days


Ozempic (Semaglutide) 1 Mg/0.75 Ml Pen.injctr 1 Mg SQ WEEKLY


     SATURDAY


Lipitor (Atorvastatin Calcium) 80 Mg Tablet 1 Tab PO DAILY


Jardiance (Empagliflozin) 25 Mg Tablet 25 Mg PO DAILY


Vitals/I & O





Vital Sign - Last 24 Hours








 11/22/21 11/22/21 11/22/21 11/22/21





 14:53 15:00 15:52 15:58


 


Temp  97.6  





  97.6  


 


Pulse 85 95 95 94


 


Resp  16  


 


B/P (MAP) 121/82 179/113 (135) 179/113 169/116 (133)


 


Pulse Ox  93  


 


O2 Delivery  Room Air  Room Air


 


    





    





 11/22/21 11/22/21 11/22/21 11/22/21





 16:02 17:52 18:02 18:09


 


Pulse 93  93 98


 


Resp 20   20


 


B/P (MAP) 145/103 (117) 167/112 (130) 167/112 161/120 (134)


 


O2 Delivery Room Air   





 11/22/21 11/22/21 11/22/21 11/22/21





 19:00 20:00 20:46 20:47


 


Temp 97.9   





 97.9   


 


Pulse 95  95 95


 


Resp 16   


 


B/P (MAP) 129/75 (93)  129/75 129/75


 


Pulse Ox 94   


 


O2 Delivery Room Air Room Air  


 


    





    





 11/22/21 11/22/21 11/22/21 11/23/21





 21:56 23:00 23:39 03:08


 


Temp  97.6  97.9





  97.6  97.9


 


Pulse  77 77 76


 


Resp  16  16


 


B/P (MAP)  198/117 (144) 198/117 159/90 (113)


 


Pulse Ox 94 96  94


 


O2 Delivery Room Air Room Air  Room Air


 


    





    





 11/23/21 11/23/21 11/23/21 11/23/21





 05:04 05:34 07:00 08:00


 


Temp   98.0 





   98.0 


 


Pulse   75 


 


Resp  16 16 


 


B/P (MAP)   173/104 (127) 


 


Pulse Ox 94 94 97 


 


O2 Delivery Room Air Room Air Room Air Room Air


 


    





    





 11/23/21 11/23/21 11/23/21 11/23/21





 08:40 08:41 08:42 08:49


 


Pulse 75 75 75 


 


Resp    20


 


B/P (MAP) 173/104 173/104 173/104 





 11/23/21 11/23/21  





 11:00 11:45  


 


Temp 98.2   





 98.2   


 


Pulse 86 86  


 


Resp 16   


 


B/P (MAP) 164/104 (124) 164/104  


 


Pulse Ox 95   


 


O2 Delivery Room Air   














Intake and Output   


 


 11/22/21 11/22/21 11/23/21





 15:00 23:00 07:00


 


Intake Total  200 ml 200 ml


 


Output Total  1 ml 


 


Balance  199 ml 200 ml











Justifications for Admission


Other Justification














TUNDE COPE MD            Nov 23, 2021 12:10

## 2021-11-23 NOTE — PDOC
[facility] PROGRESS NOTE


Date of Service


DOS:


DATE: 11/23/21 


TIME: 11:34





Chief Complaint


Chief Complaint


ACE inhibitor induced angioedema with normal C4 and C1 esterase levels


Postoperative wound infection








Okay with discharge to home with home health


Recommend to continue prednisone for another 2 days at 20 mg daily.  No need for

further tapering as patient was only on high-dose steroids for less than a week


Recommend to continue with HCTZ, amlodipine, metoprolol and clonidine patch for 

blood pressure control.


Okay to continue with insulin pump as long as there is no other concerns for 

other sources of infection infection, especially in the skin or soft tissue area


Would avoid ACE inhibitor for now, and may consider possibly ARB in the future 

if blood pressure still poorly controlled as patient is a diabetic and would 

benefit significantly from a ARB for kidney protection





History of Present Illness


History of Present Illness


56-year-old male who underwent lumbar hemilaminectomy of the L3-L4 levels on 

November 3, 2021.  He was actually discharged home the following day was doing 

well.  Unfortunately patient had a surgical site complication with infection he 

was started on Keflex and failed p.o. antibiotics over the weekend.  There was 

increasing pain and serous drainage from incision and he was brought in for 

further evaluation and IV antibiotics.  He was given fentanyl for pain and 

patient was on lisinopril chronically.  Unfortunately patient had angioedema 

most likely with his use of ACE inhibitors.  Infectious diseases consulted for 

antibiotic management.  Patient interviewed bedside and he was doing well.  He 

denies any throat swelling or wheezing or trouble breathing.  He does not have 

any periorbital edema or facial swelling.  Only upper portion of his lip is 

swollen and his tongue is fine.  He is speaking in full sentences without any 

use of his accessory muscles.  Denies fevers, confusion, syncope, chest pain, 

shortness of breath, abdominal pain, diarrhea or hematuria.





11/18/2021


Patient had an acute agitative and confusion episode last night.  Patient ripped

out the sharps container from the wall.  He appears to be close to his baseline 

today and appears lethargic but talking on the phone.  Sitting on the recliner 

and not agitated at this time.  No fevers somewhat tachycardic and no 

hypotensive episodes.  Swelling of his upper lip appears to be improved.  No 

redness or swelling.  Patient will likely benefit from a PICC line placement for

long-term IV antibiotics.  Will defer the timing and course to infectious 

disease.  Patient's chart, labs, images were reviewed and discussed with RN





11/19/2021


No acute events overnight.  Patient is ambulating through the hallways back 

pain.  There is clear drainage on the dressings which will need to be switched 

out.  Blood pressures have been uncontrolled with systolic blood pressures from 

1 60-1 90.  He does have IV antihypertensive medications as needed.  I have 

increased his hydralazine to 50 mg 3 times daily.  His sugars have also been 

greater than 300 and as high as 400.  I have added a long-acting Lantus 10 units

twice daily and keeping him on regular insulin sliding scale.  Patient usually 

does have an insulin pump in which he had his friend bring.  However due to his 

staff aureus infection I would recommend him not to place the insulin pump at 

this time and only administer insulin as needed and do subcu injections.  I have

also decreased his Solu-Medrol to a prednisone taper at 40 mg daily for now.





11/20/2021


No acute events overnight.  Patient seen and examined bedside.  Resting 

comfortably.  Appears pain is well controlled.  Blood pressures have improved.  

Will decrease his prednisone to 20 mg daily.  Continue his 5 units 3 times daily

with meals of lispro and long-acting glargine 10 units twice daily with sliding 

scale high intensity.  Patient's chart, labs, images were reviewed and discussed

with RN





11/21/2021


No acute events overnight.  Patient yesterday had some swelling around the eyes 

and I gave a dose of pulse IV steroids.  Swelling and irritation or hives around

the periorbital region has improved.  Patient has some drug-seeking behavior 

which she was requiring only IV pain medications.  Patient seen and examined 

bedside.  Patient was using his cell phone at the time and appeared to be 

content.  Not in any active pain at the time.  No acute agitative episodes 

overnight.  Wound is appearing improved without much drainage.  Continue to 

taper steroids and continued IV antibiotics per ID.  I have increased her 

glargine to 15 units twice daily and increased his premale lispro to 10 units 

before meals.  Patient's chart, labs, images were reviewed and discussed with RN





11/23/2021


No acute events overnight.  Patient seen and examined bedside.  Resting 

comfortably in bed.  Blood pressures continues to be elevated in the 170s to 

160s systolics.  I have increased his hydralazine to 75 mg 3 times daily.  I 

have stopped his prednisone.  Sugars have been better controlled.  Patient's 

chart, labs, images were reviewed and discussed with RN





Vitals/I&O


Vitals/I&O:





                                   Vital Signs








  Date Time  Temp Pulse Resp B/P (MAP) Pulse Ox O2 Delivery O2 Flow Rate FiO2


 


11/23/21 11:00 98.2 86 16 164/104 (124) 95 Room Air  





 98.2       














                                    I & O   


 


 11/22/21 11/22/21 11/23/21





 15:00 23:00 07:00


 


Intake Total  200 ml 200 ml


 


Output Total  1 ml 


 


Balance  199 ml 200 ml











Physical Exam


Physical Exam:


GENERAL:  Alert, oriented gentleman, not in distress.


HEENT:  Both pupils are round and reacting.  No conjunctival lesion. No lesion 


in the mouth.  Some puffiness around the eyes


NECK:  Supple. No JVP. No lymphadenopathy.


LUNGS:  Clear.


HEART:  S1, S2, regular.


ABDOMEN:  Soft, nontender. No organomegaly.


EXTREMITIES:  No edema, cyanosis.


SKIN:  The patient does have some hives around his neck and anterior neck


SPINE: Incision with dressing intact, dry not taken down


NEUROLOGIC:  The patient is alert, awake and appropriate. No focal neurologic 


deficit.


General:  Alert, Oriented X3, Cooperative, No acute distress, Other (facial 

swelling improved)


Heart:  Regular rate


Lungs:  Clear


Abdomen:  Normal bowel sounds


Extremities:  No clubbing


Skin:  Other (dressing changed per RN this morning, some yellow serous drainage 

noted)





Labs


Labs:





Laboratory Tests








Test


 11/22/21


11:35 11/22/21


17:24 11/22/21


21:30 11/23/21


05:30


 


Glucose (Fingerstick)


 452 mg/dL


(70-99) 428 mg/dL


(70-99) 276 mg/dL


(70-99) 





 


Sodium Level


 


 


 


 135 mmol/L


(136-145)


 


Potassium Level


 


 


 


 4.0 mmol/L


(3.5-5.1)


 


Chloride Level


 


 


 


 95 mmol/L


()


 


Carbon Dioxide Level


 


 


 


 32 mmol/L


(21-32)


 


Anion Gap    8 (6-14) 


 


Blood Urea Nitrogen


 


 


 


 25 mg/dL


(8-26)


 


Creatinine


 


 


 


 1.3 mg/dL


(0.7-1.3)


 


Estimated GFR


(Cockcroft-Gault) 


 


 


 57.3 





 


Glucose Level


 


 


 


 196 mg/dL


(70-99)


 


Calcium Level


 


 


 


 8.7 mg/dL


(8.5-10.1)


 


Test


 11/23/21


06:30 11/23/21


07:10 11/23/21


11:25 





 


White Blood Count


 12.6 x10^3/uL


(4.0-11.0) 


 


 





 


Red Blood Count


 6.00 x10^6/uL


(4.30-5.70) 


 


 





 


Hemoglobin


 17.3 g/dL


(13.0-17.5) 


 


 





 


Hematocrit


 52.7 %


(39.0-53.0) 


 


 





 


Mean Corpuscular Volume 88 fL ()    


 


Mean Corpuscular Hemoglobin 29 pg (25-35)    


 


Mean Corpuscular Hemoglobin


Concent 33 g/dL


(31-37) 


 


 





 


Red Cell Distribution Width


 14.7 %


(11.5-14.5) 


 


 





 


Platelet Count


 160 x10^3/uL


(140-400) 


 


 





 


Neutrophils (%) (Auto) 74 % (31-73)    


 


Lymphocytes (%) (Auto) 18 % (24-48)    


 


Monocytes (%) (Auto) 6 % (0-9)    


 


Eosinophils (%) (Auto) 2 % (0-3)    


 


Basophils (%) (Auto) 0 % (0-3)    


 


Neutrophils # (Auto)


 9.3 x10^3/uL


(1.8-7.7) 


 


 





 


Lymphocytes # (Auto)


 2.2 x10^3/uL


(1.0-4.8) 


 


 





 


Monocytes # (Auto)


 0.8 x10^3/uL


(0.0-1.1) 


 


 





 


Eosinophils # (Auto)


 0.2 x10^3/uL


(0.0-0.7) 


 


 





 


Basophils # (Auto)


 0.1 x10^3/uL


(0.0-0.2) 


 


 





 


Glucose (Fingerstick)


 


 202 mg/dL


(70-99) 206 mg/dL


(70-99) 














Comment


Review of Relevant


I have reviewed the following items susan (where applicable) has been applied.


Medications:





Current Medications








 Medications


  (Trade)  Dose


 Ordered  Sig/Faheem


 Route


 PRN Reason  Start Time


 Stop Time Status Last Admin


Dose Admin


 


 Amlodipine


 Besylate


  (Norvasc)  10 mg  DAILY


 PO


   11/23/21 09:00


    11/23/21 08:40





 


 Famotidine


  (Pepcid)  20 mg  BID


 PO


   11/22/21 21:00


    11/23/21 08:42





 


 Insulin Glargine


  (Lantus Syringe)  20 unit  BID


 SQ


   11/22/21 21:00


    11/23/21 08:46





 


 Insulin Human


 Lispro


  (HumaLOG)  20 units  TIDWMEALS


 SQ


   11/22/21 18:00


    11/23/21 08:51














Justifications for Admission


Other Justification














TAMARA PRATHER MD                  Nov 23, 2021 11:35

## 2021-11-24 VITALS — DIASTOLIC BLOOD PRESSURE: 78 MMHG | SYSTOLIC BLOOD PRESSURE: 129 MMHG

## 2021-11-24 VITALS — DIASTOLIC BLOOD PRESSURE: 82 MMHG | SYSTOLIC BLOOD PRESSURE: 149 MMHG

## 2021-11-24 VITALS — SYSTOLIC BLOOD PRESSURE: 200 MMHG | DIASTOLIC BLOOD PRESSURE: 111 MMHG

## 2021-11-24 VITALS — SYSTOLIC BLOOD PRESSURE: 130 MMHG | DIASTOLIC BLOOD PRESSURE: 79 MMHG

## 2021-11-24 VITALS — SYSTOLIC BLOOD PRESSURE: 121 MMHG | DIASTOLIC BLOOD PRESSURE: 68 MMHG

## 2021-11-24 LAB
ANION GAP SERPL CALC-SCNC: 7 MMOL/L (ref 6–14)
BASOPHILS # BLD AUTO: 0 X10^3/UL (ref 0–0.2)
BASOPHILS NFR BLD: 0 % (ref 0–3)
BUN SERPL-MCNC: 29 MG/DL (ref 8–26)
CALCIUM SERPL-MCNC: 8.5 MG/DL (ref 8.5–10.1)
CHLORIDE SERPL-SCNC: 96 MMOL/L (ref 98–107)
CO2 SERPL-SCNC: 32 MMOL/L (ref 21–32)
CREAT SERPL-MCNC: 1.5 MG/DL (ref 0.7–1.3)
EOSINOPHIL NFR BLD: 0.2 X10^3/UL (ref 0–0.7)
EOSINOPHIL NFR BLD: 1 % (ref 0–3)
ERYTHROCYTE [DISTWIDTH] IN BLOOD BY AUTOMATED COUNT: 14.5 % (ref 11.5–14.5)
GFR SERPLBLD BASED ON 1.73 SQ M-ARVRAT: 48.6 ML/MIN
GLUCOSE SERPL-MCNC: 242 MG/DL (ref 70–99)
HCT VFR BLD CALC: 49.3 % (ref 39–53)
HGB BLD-MCNC: 16.4 G/DL (ref 13–17.5)
LYMPHOCYTES # BLD: 1.8 X10^3/UL (ref 1–4.8)
LYMPHOCYTES NFR BLD AUTO: 14 % (ref 24–48)
MCH RBC QN AUTO: 29 PG (ref 25–35)
MCHC RBC AUTO-ENTMCNC: 33 G/DL (ref 31–37)
MCV RBC AUTO: 88 FL (ref 79–100)
MONO #: 0.7 X10^3/UL (ref 0–1.1)
MONOCYTES NFR BLD: 6 % (ref 0–9)
NEUT #: 9.9 X10^3/UL (ref 1.8–7.7)
NEUTROPHILS NFR BLD AUTO: 78 % (ref 31–73)
PLATELET # BLD AUTO: 214 X10^3/UL (ref 140–400)
POTASSIUM SERPL-SCNC: 3.9 MMOL/L (ref 3.5–5.1)
RBC # BLD AUTO: 5.6 X10^6/UL (ref 4.3–5.7)
SODIUM SERPL-SCNC: 135 MMOL/L (ref 136–145)
WBC # BLD AUTO: 12.6 X10^3/UL (ref 4–11)

## 2021-11-24 RX ADMIN — CETIRIZINE HYDROCHLORIDE SCH MG: 10 TABLET, FILM COATED ORAL at 07:58

## 2021-11-24 RX ADMIN — INSULIN LISPRO SCH UNITS: 100 INJECTION, SOLUTION INTRAVENOUS; SUBCUTANEOUS at 09:19

## 2021-11-24 RX ADMIN — INSULIN LISPRO SCH UNITS: 100 INJECTION, SOLUTION INTRAVENOUS; SUBCUTANEOUS at 09:20

## 2021-11-24 RX ADMIN — INSULIN LISPRO SCH UNITS: 100 INJECTION, SOLUTION INTRAVENOUS; SUBCUTANEOUS at 11:31

## 2021-11-24 RX ADMIN — FAMOTIDINE SCH MG: 20 TABLET ORAL at 08:00

## 2021-11-24 RX ADMIN — ASPIRIN 81 MG SCH MG: 81 TABLET ORAL at 08:00

## 2021-11-24 RX ADMIN — DAPTOMYCIN SCH MLS/HR: 500 INJECTION, POWDER, LYOPHILIZED, FOR SOLUTION INTRAVENOUS at 11:23

## 2021-11-24 RX ADMIN — METHOCARBAMOL PRN MG: 750 TABLET ORAL at 11:22

## 2021-11-24 RX ADMIN — POLYETHYLENE GLYCOL 3350 PRN GM: 17 POWDER, FOR SOLUTION ORAL at 09:21

## 2021-11-24 RX ADMIN — INSULIN GLARGINE SCH UNIT: 100 INJECTION, SOLUTION SUBCUTANEOUS at 09:00

## 2021-11-24 RX ADMIN — EZETIMIBE SCH MG: 10 TABLET ORAL at 08:00

## 2021-11-24 RX ADMIN — FEBUXOSTAT SCH MG: 40 TABLET ORAL at 07:57

## 2021-11-24 RX ADMIN — ATORVASTATIN CALCIUM SCH MG: 40 TABLET, FILM COATED ORAL at 08:00

## 2021-11-24 RX ADMIN — Medication SCH CAP: at 07:59

## 2021-11-24 RX ADMIN — DOCUSATE SODIUM SCH MG: 100 CAPSULE, LIQUID FILLED ORAL at 08:00

## 2021-11-24 RX ADMIN — PROBENECID SCH MG: 500 TABLET, FILM COATED ORAL at 07:57

## 2021-11-24 RX ADMIN — METOPROLOL TARTRATE SCH MG: 50 TABLET, FILM COATED ORAL at 07:58

## 2021-11-24 NOTE — PDOC
[facility] [facility] PROGRESS NOTE


Date of Service


DOS:


DATE: 11/24/21 


TIME: 11:57





Chief Complaint


Chief Complaint


ACE inhibitor induced angioedema with normal C4 and C1 esterase levels


Postoperative wound infection


MSSA wound - 


Diabetes mellitus.


Hypertension.


Leukocytosis.


Renal insufficiency - follow up nephrology outpatient





Okay with discharge to home with home health


Recommend to continue prednisone for another 2 days at 20 mg daily.  No need for

further tapering as patient was only on high-dose steroids for less than a week


Recommend to continue with HCTZ, amlodipine, metoprolol and clonidine for blood 

pressure control.  Discussed with nephrology to stop clonidine patch and changed

to 0.2 mg twice daily clonidine tablets on discharge


Okay to continue with insulin pump as long as there is no other concerns for 

other sources of infection infection, especially in the skin or soft tissue area


Would avoid ACE inhibitor for now, and may consider possibly ARB in the future 

if blood pressure still poorly controlled as patient is a diabetic and would 

benefit significantly from a ARB for kidney protection





History of Present Illness


History of Present Illness


56-year-old male who underwent lumbar hemilaminectomy of the L3-L4 levels on 

November 3, 2021.  He was actually discharged home the following day was doing 

well.  Unfortunately patient had a surgical site complication with infection he 

was started on Keflex and failed p.o. antibiotics over the weekend.  There was 

increasing pain and serous drainage from incision and he was brought in for 

further evaluation and IV antibiotics.  He was given fentanyl for pain and 

patient was on lisinopril chronically.  Unfortunately patient had angioedema 

most likely with his use of ACE inhibitors.  Infectious diseases consulted for 

antibiotic management.  Patient interviewed bedside and he was doing well.  He 

denies any throat swelling or wheezing or trouble breathing.  He does not have 

any periorbital edema or facial swelling.  Only upper portion of his lip is 

swollen and his tongue is fine.  He is speaking in full sentences without any 

use of his accessory muscles.  Denies fevers, confusion, syncope, chest pain, 

shortness of breath, abdominal pain, diarrhea or hematuria.





11/18/2021


Patient had an acute agitative and confusion episode last night.  Patient ripped

out the sharps container from the wall.  He appears to be close to his baseline 

today and appears lethargic but talking on the phone.  Sitting on the recliner 

and not agitated at this time.  No fevers somewhat tachycardic and no 

hypotensive episodes.  Swelling of his upper lip appears to be improved.  No 

redness or swelling.  Patient will likely benefit from a PICC line placement for

long-term IV antibiotics.  Will defer the timing and course to infectious 

disease.  Patient's chart, labs, images were reviewed and discussed with RN





11/19/2021


No acute events overnight.  Patient is ambulating through the hallways [facility].  There is clear drainage on the dressings which will need to be switched out.

 Blood pressures have been uncontrolled with systolic blood pressures from 1 60-

1 90.  He does have IV antihypertensive medications as needed.  I have increased

his hydralazine to 50 mg 3 times daily.  His sugars have also been greater than 

300 and as high as 400.  I have added a long-acting Lantus 10 units twice daily 

and keeping him on regular insulin sliding scale.  Patient usually does have an 

insulin pump in which he had his friend bring.  However due to his staff aureus 

infection I would recommend him not to place the insulin pump at this time and 

only administer insulin as needed and do subcu injections.  I have also 

decreased his Solu-Medrol to a prednisone taper at 40 mg daily for now.





11/20/2021


No acute events overnight.  Patient seen and examined bedside.  Resting 

comfortably.  Appears pain is well controlled.  Blood pressures have improved.  

Will decrease his prednisone to 20 mg daily.  Continue his 5 units 3 times daily

with meals of lispro and long-acting glargine 10 units twice daily with sliding 

scale high intensity.  Patient's chart, labs, images were reviewed and discussed

with RN





11/21/2021


No acute events overnight.  Patient yesterday had some swelling around the eyes 

and I gave a dose of pulse IV steroids.  Swelling and irritation or hives around

the periorbital region has improved.  Patient has some drug-seeking behavior 

which she was requiring only IV pain medications.  Patient seen and examined 

bedside.  Patient was using his cell phone at the time and appeared to be 

content.  Not in any active pain at the time.  No acute agitative episodes 

overnight.  Wound is appearing improved without much drainage.  Continue to 

taper steroids and continued IV antibiotics per ID.  I have increased her 

glargine to 15 units twice daily and increased his premale lispro to 10 units 

before meals.  Patient's chart, labs, images were reviewed and discussed with RN





11/23: No acute events overnight.  Patient seen and examined bedside.  Resting 

comfortably in bed.  Blood pressures continues to be elevated in the 170s to 

160s systolics.  I have increased his hydralazine to 75 mg 3 times daily.  I 

have stopped his prednisone.  Sugars have been better controlled.  





11/24: No overnight events.  Blood pressure improved discussed with nephrology 

to change to 0.2 mg twice daily of clonidine and to follow-up with nephrology 

outpatient. Patient without complaints. Wishes for d/c today, has home 

antibiotics set up, has previously had PICC and home antibiotics.





Vitals/I&O


Vitals/I&O:





                                   Vital Signs








  Date Time  Temp Pulse Resp B/P (MAP) Pulse Ox O2 Delivery O2 Flow Rate FiO2


 


11/24/21 11:23  78  130/79    


 


11/24/21 11:00 98.1  16  97 Room Air  





 98.1       














                                    I & O   


 


 11/23/21 11/23/21 11/24/21





 15:00 23:00 07:00


 


Intake Total   480 ml


 


Balance   480 ml











Physical Exam


Physical Exam:


GENERAL:  Alert, oriented gentleman, not in distress.


HEENT:  Both pupils are round and reacting.  No conjunctival lesion. No lesion 


in the mouth.  Puffiness around the eyes resolved


NECK:  Supple. No JVP. No lymphadenopathy.


LUNGS:  Clear.


HEART:  S1, S2, regular.


ABDOMEN:  Soft, nontender. No organomegaly.


EXTREMITIES:  No edema, cyanosis.


SKIN: Hives resolved


No generalized rash


SPINE: Incision with dressing intact, dry not taken down


NEUROLOGIC:  The patient is alert, awake and appropriate. No focal neurologic 


deficit.


General:  Alert, Oriented X3, Cooperative, No acute distress


Heart:  Regular rate


Lungs:  Clear


Abdomen:  Normal bowel sounds, Soft


Extremities:  No clubbing, No cyanosis


Skin:  No breakdown





Labs


Labs:





Laboratory Tests








Test


 11/23/21


16:44 11/23/21


20:52 11/24/21


04:45 11/24/21


07:27


 


Glucose (Fingerstick)


 197 mg/dL


(70-99) 297 mg/dL


(70-99) 


 179 mg/dL


(70-99)


 


White Blood Count


 


 


 12.6 x10^3/uL


(4.0-11.0) 





 


Red Blood Count


 


 


 5.60 x10^6/uL


(4.30-5.70) 





 


Hemoglobin


 


 


 16.4 g/dL


(13.0-17.5) 





 


Hematocrit


 


 


 49.3 %


(39.0-53.0) 





 


Mean Corpuscular Volume   88 fL ()  


 


Mean Corpuscular Hemoglobin   29 pg (25-35)  


 


Mean Corpuscular Hemoglobin


Concent 


 


 33 g/dL


(31-37) 





 


Red Cell Distribution Width


 


 


 14.5 %


(11.5-14.5) 





 


Platelet Count


 


 


 214 x10^3/uL


(140-400) 





 


Neutrophils (%) (Auto)   78 % (31-73)  


 


Lymphocytes (%) (Auto)   14 % (24-48)  


 


Monocytes (%) (Auto)   6 % (0-9)  


 


Eosinophils (%) (Auto)   1 % (0-3)  


 


Basophils (%) (Auto)   0 % (0-3)  


 


Neutrophils # (Auto)


 


 


 9.9 x10^3/uL


(1.8-7.7) 





 


Lymphocytes # (Auto)


 


 


 1.8 x10^3/uL


(1.0-4.8) 





 


Monocytes # (Auto)


 


 


 0.7 x10^3/uL


(0.0-1.1) 





 


Eosinophils # (Auto)


 


 


 0.2 x10^3/uL


(0.0-0.7) 





 


Basophils # (Auto)


 


 


 0.0 x10^3/uL


(0.0-0.2) 





 


Sodium Level


 


 


 135 mmol/L


(136-145) 





 


Potassium Level


 


 


 3.9 mmol/L


(3.5-5.1) 





 


Chloride Level


 


 


 96 mmol/L


() 





 


Carbon Dioxide Level


 


 


 32 mmol/L


(21-32) 





 


Anion Gap   7 (6-14)  


 


Blood Urea Nitrogen


 


 


 29 mg/dL


(8-26) 





 


Creatinine


 


 


 1.5 mg/dL


(0.7-1.3) 





 


Estimated GFR


(Cockcroft-Gault) 


 


 48.6 


 





 


Glucose Level


 


 


 242 mg/dL


(70-99) 





 


Calcium Level


 


 


 8.5 mg/dL


(8.5-10.1) 





 


Test


 11/24/21


10:41 


 


 





 


Glucose (Fingerstick)


 307 mg/dL


(70-99) 


 


 














Comment


Review of Relevant


I have reviewed the following items susan (where applicable) has been applied.


Medications:





Current Medications








 Medications


  (Trade)  Dose


 Ordered  Sig/Faheem


 Route


 PRN Reason  Start Time


 Stop Time Status Last Admin


Dose Admin


 


 Daptomycin 640 mg/


 Sodium Chloride  50 ml @ 


 100 mls/hr  Q24H


 IV


   11/23/21 12:00


    11/24/21 11:23





 


 Cetirizine HCl


  (ZyrTEC)  10 mg  DAILY


 PO


   11/23/21 16:00


    11/24/21 07:58





 


 Clonidine HCl


  (Catapres)  0.1 mg  1X  ONCE


 PO


   11/24/21 11:15


 11/24/21 11:18 DC 11/24/21 11:23














Justifications for Admission


Other Justification














AMY CRUZ MD        Nov 24, 2021 12:08

## 2021-11-24 NOTE — NUR
Patient left around 1640 with his PICC line in place and his home medical care set up by the 
discharge planner. Medications sent to his pharmacy per Dr Ramírez. Follow ups to be made and 
followed up with Dr Trevino, Dr Lagos, and Dr Lee. Dressing to low back changed prior to 
dismissal and extra dressing given to the patient. No concerns noted at discharge.

## 2021-11-24 NOTE — PDOC
PROGRESS NOTES


Date of Service


DATE: 11/24/21 


TIME: 14:28





Subjective


Subjective


up in chair


pain better controlled


has been up ambulating





Objective


Objective





Vital Signs








  Date Time  Temp Pulse Resp B/P (MAP) Pulse Ox O2 Delivery O2 Flow Rate FiO2


 


11/24/21 13:13     97 Room Air  


 


11/24/21 11:23  78  130/79    


 


11/24/21 11:00 98.1  16     





 98.1       














Intake and Output 


 


 11/24/21





 07:00


 


Intake Total 480 ml


 


Balance 480 ml


 


 


 


Intake Oral 480 ml


 


# Voids 3











Physical Exam


General:  Alert, Oriented X3, Cooperative, No acute distress


Neuro:  Normal speech


Skin:  Other (dressing changed per RN)





Plan


Plan of Care


ok to dc home with Home health- dressing changes, BP checks


IV antibiotics


f/u with ID 


F/u with nephrology


F/u with me in 2 weeks


D/W RN





Comment


Review of Relevant


I have reviewed the following items susan (where applicable) has been applied.


Labs





Laboratory Tests








Test


 11/22/21


17:24 11/22/21


21:30 11/23/21


05:30 11/23/21


06:30


 


Glucose (Fingerstick)


 428 mg/dL


(70-99) 276 mg/dL


(70-99) 


 





 


Sodium Level


 


 


 135 mmol/L


(136-145) 





 


Potassium Level


 


 


 4.0 mmol/L


(3.5-5.1) 





 


Chloride Level


 


 


 95 mmol/L


() 





 


Carbon Dioxide Level


 


 


 32 mmol/L


(21-32) 





 


Anion Gap   8 (6-14)  


 


Blood Urea Nitrogen


 


 


 25 mg/dL


(8-26) 





 


Creatinine


 


 


 1.3 mg/dL


(0.7-1.3) 





 


Estimated GFR


(Cockcroft-Gault) 


 


 57.3 


 





 


Glucose Level


 


 


 196 mg/dL


(70-99) 





 


Calcium Level


 


 


 8.7 mg/dL


(8.5-10.1) 





 


White Blood Count


 


 


 


 12.6 x10^3/uL


(4.0-11.0)


 


Red Blood Count


 


 


 


 6.00 x10^6/uL


(4.30-5.70)


 


Hemoglobin


 


 


 


 17.3 g/dL


(13.0-17.5)


 


Hematocrit


 


 


 


 52.7 %


(39.0-53.0)


 


Mean Corpuscular Volume    88 fL () 


 


Mean Corpuscular Hemoglobin    29 pg (25-35) 


 


Mean Corpuscular Hemoglobin


Concent 


 


 


 33 g/dL


(31-37)


 


Red Cell Distribution Width


 


 


 


 14.7 %


(11.5-14.5)


 


Platelet Count


 


 


 


 160 x10^3/uL


(140-400)


 


Neutrophils (%) (Auto)    74 % (31-73) 


 


Lymphocytes (%) (Auto)    18 % (24-48) 


 


Monocytes (%) (Auto)    6 % (0-9) 


 


Eosinophils (%) (Auto)    2 % (0-3) 


 


Basophils (%) (Auto)    0 % (0-3) 


 


Neutrophils # (Auto)


 


 


 


 9.3 x10^3/uL


(1.8-7.7)


 


Lymphocytes # (Auto)


 


 


 


 2.2 x10^3/uL


(1.0-4.8)


 


Monocytes # (Auto)


 


 


 


 0.8 x10^3/uL


(0.0-1.1)


 


Eosinophils # (Auto)


 


 


 


 0.2 x10^3/uL


(0.0-0.7)


 


Basophils # (Auto)


 


 


 


 0.1 x10^3/uL


(0.0-0.2)


 


Test


 11/23/21


07:10 11/23/21


11:25 11/23/21


16:44 11/23/21


20:52


 


Glucose (Fingerstick)


 202 mg/dL


(70-99) 206 mg/dL


(70-99) 197 mg/dL


(70-99) 297 mg/dL


(70-99)


 


Test


 11/24/21


04:45 11/24/21


07:27 11/24/21


10:41 





 


White Blood Count


 12.6 x10^3/uL


(4.0-11.0) 


 


 





 


Red Blood Count


 5.60 x10^6/uL


(4.30-5.70) 


 


 





 


Hemoglobin


 16.4 g/dL


(13.0-17.5) 


 


 





 


Hematocrit


 49.3 %


(39.0-53.0) 


 


 





 


Mean Corpuscular Volume 88 fL ()    


 


Mean Corpuscular Hemoglobin 29 pg (25-35)    


 


Mean Corpuscular Hemoglobin


Concent 33 g/dL


(31-37) 


 


 





 


Red Cell Distribution Width


 14.5 %


(11.5-14.5) 


 


 





 


Platelet Count


 214 x10^3/uL


(140-400) 


 


 





 


Neutrophils (%) (Auto) 78 % (31-73)    


 


Lymphocytes (%) (Auto) 14 % (24-48)    


 


Monocytes (%) (Auto) 6 % (0-9)    


 


Eosinophils (%) (Auto) 1 % (0-3)    


 


Basophils (%) (Auto) 0 % (0-3)    


 


Neutrophils # (Auto)


 9.9 x10^3/uL


(1.8-7.7) 


 


 





 


Lymphocytes # (Auto)


 1.8 x10^3/uL


(1.0-4.8) 


 


 





 


Monocytes # (Auto)


 0.7 x10^3/uL


(0.0-1.1) 


 


 





 


Eosinophils # (Auto)


 0.2 x10^3/uL


(0.0-0.7) 


 


 





 


Basophils # (Auto)


 0.0 x10^3/uL


(0.0-0.2) 


 


 





 


Sodium Level


 135 mmol/L


(136-145) 


 


 





 


Potassium Level


 3.9 mmol/L


(3.5-5.1) 


 


 





 


Chloride Level


 96 mmol/L


() 


 


 





 


Carbon Dioxide Level


 32 mmol/L


(21-32) 


 


 





 


Anion Gap 7 (6-14)    


 


Blood Urea Nitrogen


 29 mg/dL


(8-26) 


 


 





 


Creatinine


 1.5 mg/dL


(0.7-1.3) 


 


 





 


Estimated GFR


(Cockcroft-Gault) 48.6 


 


 


 





 


Glucose Level


 242 mg/dL


(70-99) 


 


 





 


Calcium Level


 8.5 mg/dL


(8.5-10.1) 


 


 





 


Glucose (Fingerstick)


 


 179 mg/dL


(70-99) 307 mg/dL


(70-99) 











Laboratory Tests








Test


 11/23/21


16:44 11/23/21


20:52 11/24/21


04:45 11/24/21


07:27


 


Glucose (Fingerstick)


 197 mg/dL


(70-99) 297 mg/dL


(70-99) 


 179 mg/dL


(70-99)


 


White Blood Count


 


 


 12.6 x10^3/uL


(4.0-11.0) 





 


Red Blood Count


 


 


 5.60 x10^6/uL


(4.30-5.70) 





 


Hemoglobin


 


 


 16.4 g/dL


(13.0-17.5) 





 


Hematocrit


 


 


 49.3 %


(39.0-53.0) 





 


Mean Corpuscular Volume   88 fL ()  


 


Mean Corpuscular Hemoglobin   29 pg (25-35)  


 


Mean Corpuscular Hemoglobin


Concent 


 


 33 g/dL


(31-37) 





 


Red Cell Distribution Width


 


 


 14.5 %


(11.5-14.5) 





 


Platelet Count


 


 


 214 x10^3/uL


(140-400) 





 


Neutrophils (%) (Auto)   78 % (31-73)  


 


Lymphocytes (%) (Auto)   14 % (24-48)  


 


Monocytes (%) (Auto)   6 % (0-9)  


 


Eosinophils (%) (Auto)   1 % (0-3)  


 


Basophils (%) (Auto)   0 % (0-3)  


 


Neutrophils # (Auto)


 


 


 9.9 x10^3/uL


(1.8-7.7) 





 


Lymphocytes # (Auto)


 


 


 1.8 x10^3/uL


(1.0-4.8) 





 


Monocytes # (Auto)


 


 


 0.7 x10^3/uL


(0.0-1.1) 





 


Eosinophils # (Auto)


 


 


 0.2 x10^3/uL


(0.0-0.7) 





 


Basophils # (Auto)


 


 


 0.0 x10^3/uL


(0.0-0.2) 





 


Sodium Level


 


 


 135 mmol/L


(136-145) 





 


Potassium Level


 


 


 3.9 mmol/L


(3.5-5.1) 





 


Chloride Level


 


 


 96 mmol/L


() 





 


Carbon Dioxide Level


 


 


 32 mmol/L


(21-32) 





 


Anion Gap   7 (6-14)  


 


Blood Urea Nitrogen


 


 


 29 mg/dL


(8-26) 





 


Creatinine


 


 


 1.5 mg/dL


(0.7-1.3) 





 


Estimated GFR


(Cockcroft-Gault) 


 


 48.6 


 





 


Glucose Level


 


 


 242 mg/dL


(70-99) 





 


Calcium Level


 


 


 8.5 mg/dL


(8.5-10.1) 





 


Test


 11/24/21


10:41 


 


 





 


Glucose (Fingerstick)


 307 mg/dL


(70-99) 


 


 











Microbiology


11/16/21 Gram Stain - Final, Complete


           


11/16/21 Aerobic and Anaerobic Culture - Final, Complete


           


11/16/21 Antimicrobic Susceptibility - Final, Complete


Medications





Current Medications


Fentanyl Citrate (Fentanyl 2ml Vial) 50 mcg PRN Q2HR  PRN IVP PAIN Last 

administered on 11/17/21at 02:11;  Start 11/16/21 at 16:00;  Stop 11/17/21 at 

03:01;  Status DC


Aspirin (Aspirin Chewable) 81 mg DAILY PO  Last administered on 11/24/21at 

08:00;  Start 11/17/21 at 09:00


Clonidine HCl (Catapres Tts-2) 1 patch WEEKLY TD  Last administered on 

11/23/21at 08:42;  Start 11/16/21 at 17:00;  Stop 11/24/21 at 11:07;  Status DC


Docusate Sodium (Colace) 100 mg BID PO  Last administered on 11/24/21at 08:00;  

Start 11/16/21 at 21:00


EZETIMIBE (Zetia) 10 mg DAILY PO  Last administered on 11/24/21at 08:00;  Start 

11/17/21 at 09:00


Hydralazine HCl (Apresoline) 50 mg DAILY PO  Last administered on 11/18/21at 

08:41;  Start 11/17/21 at 09:00;  Stop 11/18/21 at 14:47;  Status DC


Hydrochlorothiazide (Microzide) 12.5 mg DAILY PO  Last administered on 

11/24/21at 08:00;  Start 11/17/21 at 09:00


Acetaminophen/ Hydrocodone Bitart (Lortab 7.5/325) 2 tab PRN Q4HRS  PRN PO PAIN 

Last administered on 11/17/21at 10:25;  Start 11/16/21 at 16:00;  Stop 11/17/21 

at 13:34;  Status DC


Lisinopril (Prinivil) 20 mg DAILY PO ;  Start 11/17/21 at 09:00;  Stop 11/17/21 

at 06:31;  Status DC


Methocarbamol (Robaxin) 750 mg TID PRN  PRN PO MUSCLE SPASMS Last administered 

on 11/24/21at 11:22;  Start 11/16/21 at 16:00


Atorvastatin Calcium (Lipitor) 80 mg DAILY PO  Last administered on 11/24/21at 

08:00;  Start 11/17/21 at 09:00


Non-Formulary Medication (Empagliflozin (Jardiance)) 25 mg DAILY PO ;  Start 

11/17/21 at 09:00;  Stop 11/17/21 at 18:17;  Status DC


Febuxostat (Uloric) 80 mg DAILY PO  Last administered on 11/24/21at 07:57;  

Start 11/17/21 at 09:00


Non-Formulary Medication (Icosapent Ethyl (Vascepa)) 1 gm BID PO ;  Start 

11/16/21 at 21:00;  Stop 11/17/21 at 18:18;  Status DC


Metoprolol Tartrate (Lopressor) 50 mg BID PO  Last administered on 11/24/21at 

07:58;  Start 11/16/21 at 21:00


Probenecid (Benemid) 500 mg BID PO  Last administered on 11/24/21at 07:57;  

Start 11/16/21 at 21:00


Non-Formulary Medication (Semaglutide (Ozempic)) 1 mg WEEKLY SQ ;  Start 

11/23/21 at 09:00;  Status UNV


Zolpidem Tartrate (Ambien) 5 mg PRN QHS  PRN PO INSOMNIA, MAY REPEAT X1 Last 

administered on 11/17/21at 21:00;  Start 11/16/21 at 16:15


Insulin Human Lispro (HumaLOG) 0-9 UNITS TIDWMEALS SQ  Last administered on 

11/24/21at 11:31;  Start 11/16/21 at 00:00


Dextrose (Dextrose 50%-Water Syringe) 12.5 gm PRN Q15MIN  PRN IV SEE COMMENTS;  

Start 11/16/21 at 20:45


Diphenhydramine HCl (Benadryl) 50 mg PRN Q6HRS  PRN IVP ITCHING, 1ST CHOICE Last

administered on 11/23/21at 20:05;  Start 11/17/21 at 03:00


Diphenhydramine HCl (Benadryl) 100 mg PRN Q6HRS  PRN IVP ITCHING, 2ND CHOICE 

Last administered on 11/23/21at 12:48;  Start 11/17/21 at 03:00


Morphine Sulfate (Morphine Sulfate) 2 mg PRN Q2HR  PRN IVP PAIN Last 

administered on 11/23/21at 12:45;  Start 11/17/21 at 03:00


Famotidine (Pepcid Vial) 20 mg BID IVP  Last administered on 11/22/21at 09:00;  

Start 11/17/21 at 07:00;  Stop 11/22/21 at 12:26;  Status DC


Methylprednisolone Sodium Succinate (SOLU-Medrol 40MG VIAL) 40 mg Q12HR IV  Last

administered on 11/19/21at 08:02;  Start 11/17/21 at 07:00;  Stop 11/19/21 at 

09:43;  Status DC


Daptomycin 640 mg/ Sodium Chloride 50 ml @  100 mls/hr Q24H IV  Last admin

istered on 11/17/21at 11:01;  Start 11/17/21 at 10:00;  Stop 11/17/21 at 14:59; 

Status DC


Piperacillin Sod/ Tazobactam Sod 3.375 gm/Sodium Chloride 50 ml @  100 mls/hr 

Q6HRS IV  Last administered on 11/19/21at 06:16;  Start 11/17/21 at 10:00;  Stop

11/19/21 at 08:23;  Status DC


Lactobacillus Rhamnosus (Culturelle) 1 cap BID PO  Last administered on 

11/24/21at 07:59;  Start 11/17/21 at 21:00


Oxycodone/ Acetaminophen (Percocet 5/325) 1 tab PRN Q4HRS  PRN PO MODERATE PAIN;

 Start 11/17/21 at 13:45;  Stop 11/22/21 at 10:13;  Status DC


Oxycodone/ Acetaminophen (Percocet 5/325) 2 tab PRN Q4HRS  PRN PO SEVERE PAIN 

Last administered on 11/22/21at 08:14;  Start 11/17/21 at 13:45;  Stop 11/22/21 

at 10:13;  Status DC


Daptomycin 500 mg/ Sodium Chloride 50 ml @  100 mls/hr Q24H IV  Last 

administered on 11/20/21at 09:03;  Start 11/18/21 at 09:00;  Stop 11/20/21 at 

12:51;  Status DC


Labetalol HCl (Normodyne Iv Push) 10 mg PRN Q15MIN  PRN IVP HYPERTENSION Last 

administered on 11/22/21at 23:39;  Start 11/18/21 at 00:00


Ziprasidone (Geodon) 20 mg PRN Q2HRS  PRN PO ANXIETY / AGITATION Last 

administered on 11/19/21at 10:10;  Start 11/18/21 at 00:00


Hydralazine HCl (Apresoline) 25 mg TID PO  Last administered on 11/19/21at 

08:01;  Start 11/18/21 at 00:30;  Stop 11/19/21 at 09:37;  Status DC


Insulin Human Lispro (HumaLOG) 5 units 1X  ONCE SQ  Last administered on 

11/18/21at 00:19;  Start 11/18/21 at 00:30;  Stop 11/18/21 at 00:31;  Status DC


Ziprasidone (Geodon Im) 10 mg PRN Q2HRS  PRN IM AGITATION Last administered on 

11/18/21at 06:05;  Start 11/18/21 at 00:15


Lidocaine HCl (Buffered Lidocaine 1%) 3 ml STK-MED ONCE .ROUTE ;  Start 11/18/21

at 12:11;  Stop 11/18/21 at 12:11;  Status DC


Lidocaine HCl (Buffered Lidocaine 1%) 3 ml STK-MED ONCE .ROUTE ;  Start 11/18/21

at 12:21;  Stop 11/18/21 at 12:22;  Status DC


Lidocaine HCl (Buffered Lidocaine 1%) 6 ml 1X  ONCE INJ  Last administered on 

11/18/21at 13:10;  Start 11/18/21 at 12:30;  Stop 11/18/21 at 12:34;  Status DC


Gadoterate Meglumine (Clariscan) 20 ml 1X  ONCE IVP  Last administered on 

11/18/21at 15:55;  Start 11/18/21 at 15:30;  Stop 11/18/21 at 15:31;  Status DC


Insulin Human Lispro (HumaLOG) 12 units 1X  ONCE SQ  Last administered on 

11/18/21at 22:05;  Start 11/18/21 at 22:00;  Stop 11/18/21 at 22:01;  Status DC


Hydralazine HCl (Apresoline) 50 mg TID PO  Last administered on 11/23/21at 

08:42;  Start 11/19/21 at 14:00;  Stop 11/23/21 at 11:34;  Status DC


Insulin Glargine (Lantus Syringe) 10 unit BID SQ  Last administered on 

11/20/21at 23:06;  Start 11/19/21 at 11:00;  Stop 11/21/21 at 08:13;  Status DC


Prednisone (Prednisone) 40 mg DAILY PO  Last administered on 11/20/21at 08:31;  

Start 11/20/21 at 09:00;  Stop 11/20/21 at 09:15;  Status DC


Insulin Human Lispro (HumaLOG) 5 units TIDWMEALS SQ  Last administered on 

11/20/21at 17:00;  Start 11/19/21 at 17:00;  Stop 11/21/21 at 08:13;  Status DC


Polyethylene Glycol (miraLAX PACKET) 17 gm PRN Q4HRS  PRN PO CONSTIPATION Last 

administered on 11/24/21at 09:21;  Start 11/19/21 at 16:15


Insulin Human Lispro (HumaLOG) 20 units 1X  ONCE SQ  Last administered on 

11/19/21at 18:18;  Start 11/19/21 at 18:00;  Stop 11/19/21 at 18:03;  Status DC


Prednisone (Prednisone) 20 mg DAILY PO  Last administered on 11/23/21at 08:40;  

Start 11/21/21 at 09:00;  Stop 11/23/21 at 11:34;  Status DC


Amlodipine Besylate (Norvasc) 5 mg DAILY PO  Last administered on 11/21/21at 

07:39;  Start 11/20/21 at 09:15;  Stop 11/21/21 at 08:12;  Status DC


Cefazolin Sodium/ Dextrose 50 ml @  100 mls/hr Q8HRS IV  Last administered on 

11/23/21at 05:04;  Start 11/20/21 at 14:00;  Stop 11/23/21 at 10:36;  Status DC


Methylprednisolone Sodium Succinate (SOLU-Medrol 125MG VIAL) 125 mg 1X  ONCE IV 

Last administered on 11/20/21at 14:59;  Start 11/20/21 at 14:30;  Stop 11/20/21 

at 14:31;  Status DC


Amlodipine Besylate (Norvasc) 10 mg DAILY PO  Last administered on 11/22/21at 

08:15;  Start 11/21/21 at 09:00;  Stop 11/22/21 at 12:23;  Status DC


Insulin Glargine (Lantus Syringe) 15 unit BID SQ  Last administered on 

11/22/21at 10:20;  Start 11/21/21 at 09:00;  Stop 11/22/21 at 12:33;  Status DC


Insulin Human Lispro (HumaLOG) 10 units TIDWMEALS SQ  Last administered on 

11/22/21at 12:41;  Start 11/21/21 at 12:00;  Stop 11/22/21 at 17:53;  Status DC


Oxycodone HCl (Roxicodone) 5 mg PRN Q4HRS  PRN PO MODERATE PAIN;  Start 11/22/21

at 10:15


Oxycodone HCl (Roxicodone) 10 mg PRN Q4HRS  PRN PO SEVERE PAIN Last administered

on 11/24/21at 12:43;  Start 11/22/21 at 10:30


Amlodipine Besylate (Norvasc) 10 mg DAILY PO  Last administered on 11/24/21at 

08:00;  Start 11/23/21 at 09:00


Famotidine (Pepcid) 20 mg BID PO  Last administered on 11/24/21at 08:00;  Start 

11/22/21 at 21:00


Insulin Glargine (Lantus Syringe) 20 unit BID SQ  Last administered on 

11/24/21at 09:00;  Start 11/22/21 at 21:00


Insulin Human Regular (HumuLIN R VIAL) 15 unit 1X  ONCE IV ;  Start 11/22/21 at 

13:00;  Stop 11/22/21 at 13:01;  Status DC


Insulin Human Lispro (HumaLOG) 15 units 1X  ONCE SQ ;  Start 11/22/21 at 13:00; 

Stop 11/22/21 at 13:01;  Status DC


Insulin Human Lispro (HumaLOG) 20 units TIDWMEALS SQ  Last administered on 

11/24/21at 11:31;  Start 11/22/21 at 18:00


Daptomycin 640 mg/ Sodium Chloride 50 ml @  100 mls/hr Q24H IV  Last 

administered on 11/24/21at 11:23;  Start 11/23/21 at 12:00


Hydralazine HCl (Apresoline) 75 mg TID PO  Last administered on 11/24/21at 

07:59;  Start 11/23/21 at 11:45


Cetirizine HCl (ZyrTEC) 10 mg DAILY PO  Last administered on 11/24/21at 07:58;  

Start 11/23/21 at 16:00


Clonidine HCl (Catapres) 0.1 mg 1X  ONCE PO  Last administered on 11/24/21at 

11:23;  Start 11/24/21 at 11:15;  Stop 11/24/21 at 11:18;  Status DC





Active Scripts


Active


Clonidine Hcl 0.2 Mg Tablet 1 Tab PO BID 30 Days


Amlodipine Besylate 10 Mg Tablet 10 Mg PO DAILY 30 Days


Culturelle (Lactobacillus Rhamnosus Gg) 1 Each Cap.sprink 1 Cap PO BID 42 Days


Daptomycin 350 Mg Vial 600 Mg IV DAILY 42 Days


Colace (Docusate Sodium) 100 Mg Capsule 100 Mg PO BID 60 Days


Methocarbamol 750 Mg Tablet 750 Mg PO TID PRN PRN


Reported


[V-Go 40 Day Kit ]   Units SQ Q1HR


     5 CLICKS @ MEALS


     2 CLICKS @ SNACKS


Freestyle Lite Test Strip (Blood Sugar Diagnostic) 1 Each Strip 1 Strip MC DAILY

30 Days


Hydrocodone-Apap 7.5-325  ** (Hydrocodone Bit/Acetaminophen) 1 Tab Tablet 1-2 

Tab PO PRN Q4HRS PRN


Vascepa (Icosapent Ethyl) 1 Gm Capsule 1 Gm PO BID


Zolpidem Tartrate Er (Zolpidem Tartrate) 12.5 Mg Tab.mphase 12.5 Mg PO PRN QHS 

PRN


Probenecid 500 Mg Tablet 500 Mg PO BID


Hydrochlorothiazide Capsule  ** (Hydrochlorothiazide) 12.5 Mg Capsule 12.5 Mg PO

DAILY


Hydralazine Hcl 50 Mg Tablet 1 Tab PO DAILY


Uloric (Febuxostat) 80 Mg Tablet 1 Tab PO DAILY 30 Days


Bystolic (Nebivolol Hcl) 20 Mg Tablet 20 Mg PO DAILY


Aspirin 81 Mg Tab.chew 1 Tab PO DAILY


Zetia (Ezetimibe) 10 Mg Tablet 10 Mg PO DAILY


Ozempic (Semaglutide) 1 Mg/0.75 Ml Pen.injctr 1 Mg SQ WEEKLY


     SATURDAY


Lipitor (Atorvastatin Calcium) 80 Mg Tablet 1 Tab PO DAILY


Jardiance (Empagliflozin) 25 Mg Tablet 25 Mg PO DAILY


Vitals/I & O





Vital Sign - Last 24 Hours








 11/23/21 11/23/21 11/23/21 11/23/21





 15:00 19:35 20:00 20:02


 


Temp 98.2 97.7  





 98.2 97.7  


 


Pulse 79 81  91


 


Resp 16 18  


 


B/P (MAP) 125/80 (95) 157/100 (119)  157/100


 


Pulse Ox 96 98  


 


O2 Delivery Room Air Room Air Room Air 


 


    





    





 11/23/21 11/23/21 11/24/21 11/24/21





 20:02 23:04 02:34 02:55


 


Temp  97.9  97.8





  97.9  97.8


 


Pulse 91 86  80


 


Resp  18  18


 


B/P (MAP) 157/100 133/82 (99)  149/82 (104)


 


Pulse Ox  95 95 97


 


O2 Delivery  Room Air Room Air Room Air


 


    





    





 11/24/21 11/24/21 11/24/21 11/24/21





 07:00 07:45 07:58 07:58


 


Temp 97.8   





 97.8   


 


Pulse 79   


 


Resp 18   


 


B/P (MAP) 200/111 (140)  200/111 


 


Pulse Ox 94   97


 


O2 Delivery Room Air Room Air  Room Air


 


    





    





 11/24/21 11/24/21 11/24/21 11/24/21





 07:59 08:00 08:28 11:00


 


Temp    98.1





    98.1


 


Pulse    78


 


Resp    16


 


B/P (MAP) 200/111 200/111  130/79 (96)


 


Pulse Ox   97 97


 


O2 Delivery   Room Air Room Air


 


    





    





 11/24/21 11/24/21 11/24/21 





 11:23 12:43 13:13 


 


Pulse 78   


 


B/P (MAP) 130/79   


 


Pulse Ox  97 97 


 


O2 Delivery  Room Air Room Air 














Intake and Output   


 


 11/23/21 11/23/21 11/24/21





 15:00 23:00 07:00


 


Intake Total   480 ml


 


Balance   480 ml











Justifications for Admission


Other Justification














TUNDE COPE MD            Nov 24, 2021 14:30

## 2021-11-24 NOTE — PDOC
Infectious Disease Note


Subjective:


Subjective


Patient without complaints


Eager to get discharged home today


Neck welts have resolved after local Benadryl cream


Back pain is under control


Ambulating in the room without difficulty


Denies fever, nausea, vomiting, shortness of breath, diarrhea, abdominal pain, 

rash


Otherwise as above





Vital Signs:


Vital Signs





Vital Signs








  Date Time  Temp Pulse Resp B/P (MAP) Pulse Ox O2 Delivery O2 Flow Rate FiO2


 


11/24/21 08:28     97 Room Air  


 


11/24/21 08:00    200/111    


 


11/24/21 07:00 97.8 79 18     





 97.8       











Physical Exam:


PHYSICAL EXAM


GENERAL:  Alert, oriented gentleman, not in distress.


HEENT:  Both pupils are round and reacting.  No conjunctival lesion. No lesion 


in the mouth.  Puffiness around the eyes resolved


NECK:  Supple. No JVP. No lymphadenopathy.


LUNGS:  Clear.


HEART:  S1, S2, regular.


ABDOMEN:  Soft, nontender. No organomegaly.


EXTREMITIES:  No edema, cyanosis.


SKIN: Hives resolved


No generalized rash


SPINE: Incision with dressing intact, dry not taken down


NEUROLOGIC:  The patient is alert, awake and appropriate. No focal neurologic 


deficit.





Medications:


Inpatient Meds:


Medications reviewed.





Labs:


Lab





Laboratory Tests








Test


 11/23/21


11:25 11/23/21


16:44 11/23/21


20:52 11/24/21


04:45


 


Glucose (Fingerstick)


 206 mg/dL


(70-99) 197 mg/dL


(70-99) 297 mg/dL


(70-99) 





 


White Blood Count


 


 


 


 12.6 x10^3/uL


(4.0-11.0)


 


Red Blood Count


 


 


 


 5.60 x10^6/uL


(4.30-5.70)


 


Hemoglobin


 


 


 


 16.4 g/dL


(13.0-17.5)


 


Hematocrit


 


 


 


 49.3 %


(39.0-53.0)


 


Mean Corpuscular Volume    88 fL () 


 


Mean Corpuscular Hemoglobin    29 pg (25-35) 


 


Mean Corpuscular Hemoglobin


Concent 


 


 


 33 g/dL


(31-37)


 


Red Cell Distribution Width


 


 


 


 14.5 %


(11.5-14.5)


 


Platelet Count


 


 


 


 214 x10^3/uL


(140-400)


 


Neutrophils (%) (Auto)    78 % (31-73) 


 


Lymphocytes (%) (Auto)    14 % (24-48) 


 


Monocytes (%) (Auto)    6 % (0-9) 


 


Eosinophils (%) (Auto)    1 % (0-3) 


 


Basophils (%) (Auto)    0 % (0-3) 


 


Neutrophils # (Auto)


 


 


 


 9.9 x10^3/uL


(1.8-7.7)


 


Lymphocytes # (Auto)


 


 


 


 1.8 x10^3/uL


(1.0-4.8)


 


Monocytes # (Auto)


 


 


 


 0.7 x10^3/uL


(0.0-1.1)


 


Eosinophils # (Auto)


 


 


 


 0.2 x10^3/uL


(0.0-0.7)


 


Basophils # (Auto)


 


 


 


 0.0 x10^3/uL


(0.0-0.2)


 


Sodium Level


 


 


 


 135 mmol/L


(136-145)


 


Potassium Level


 


 


 


 3.9 mmol/L


(3.5-5.1)


 


Chloride Level


 


 


 


 96 mmol/L


()


 


Carbon Dioxide Level


 


 


 


 32 mmol/L


(21-32)


 


Anion Gap    7 (6-14) 


 


Blood Urea Nitrogen


 


 


 


 29 mg/dL


(8-26)


 


Creatinine


 


 


 


 1.5 mg/dL


(0.7-1.3)


 


Estimated GFR


(Cockcroft-Gault) 


 


 


 48.6 





 


Glucose Level


 


 


 


 242 mg/dL


(70-99)


 


Calcium Level


 


 


 


 8.5 mg/dL


(8.5-10.1)


 


Test


 11/24/21


07:27 


 


 





 


Glucose (Fingerstick)


 179 mg/dL


(70-99) 


 


 














Objective:


Assessment:


1.  Postsurgical spine infection.  MSSA CRP 8.2, ESR 6


2.  Status post right-sided hemilaminotomy and microdiscectomy L3-L4 done on 


11/03/2021.


3.  Diabetes mellitus.


4.  Hypertension.


5.  Leukocytosis.


6.  Renal insufficiency.


7.  ACE inhibitor induced angioedema


8.  Dermatitis around the neck less likely from cefazolin





Plan:


Plan of Care


Continue daptomycin( do not feel cefazolin causes hives)


Could be from one of his pain medications.


Prednisone per primary


Pain control per primary/neurosurgery


When ready for discharge patient will need daptomycin as outpatient


Prescription in chart


Social work to assist with discharge antibiotics


PICC  and complications discussed


Wound care per neurosurgery


Follow-up in ID clinic as scheduled phone [phone]


Discussed with KEVIN SALAZAR MD           Nov 24, 2021 10:18

## 2021-11-24 NOTE — PDOC2
CONSULT


Date of Consult


Date of Consult


DATE: 11/24/21 


TIME: 11:08





Reason for Consult


Reason for Consult:


RENAL FAILURE





Referring Physician


Referring Physician:


PRISCA





Identification/Chief Complaint


Chief Complaint


BACK PAIN





Source


Source:  Chart review, Patient





History of Present Illness


Reason for Visit:


THIS IS A 56 YR OLD WITH BACK PAIN. UNDERWENT A HEMILAMINECTOMY OF L3-4 LEVELS. 

WENT HOME AND THEN DEVELOPED AN INFECTION. HAS BEEN ON ANTIBIOTICS. CR OF 1.5. 

HAS BEEN TOLD BY HIS PCP HE HAS CKD BUT HAS NOT YET SEEN RENAL. NO HEMODYNAMIC 

INSTABILITY. HAS DM II AND HTN OF LONG STANDING DURATION. UNABLE TO TAKE AN ACE-

I DUE TO ANGIOEDEMA. 24 HR PROTEIN HAS NOT BEEN QUANTIFIED. NO OTHER  HX





Past Medical History


Cardiovascular:  HTN


Musculoskeletal:   low back pain


Renal/:  Chronic renal insuff


Endocrine:  Diabetes





Past Surgical History


Past Surgical History


LUMBAR LAMINECTOMY





Family History


Family History:  Hypertension





Social History


No


ALCOHOL:  rare


Drugs:  None


Lives:  with Family





Current Medications


Current Medications





Current Medications


Fentanyl Citrate (Fentanyl 2ml Vial) 50 mcg PRN Q2HR  PRN IVP PAIN Last 

administered on 11/17/21at 02:11;  Start 11/16/21 at 16:00;  Stop 11/17/21 at 

03:01;  Status DC


Aspirin (Aspirin Chewable) 81 mg DAILY PO  Last administered on 11/24/21at 

08:00;  Start 11/17/21 at 09:00


Clonidine HCl (Catapres Tts-2) 1 patch WEEKLY TD  Last administered on 

11/23/21at 08:42;  Start 11/16/21 at 17:00


Docusate Sodium (Colace) 100 mg BID PO  Last administered on 11/24/21at 08:00;  

Start 11/16/21 at 21:00


EZETIMIBE (Zetia) 10 mg DAILY PO  Last administered on 11/24/21at 08:00;  Start 

11/17/21 at 09:00


Hydralazine HCl (Apresoline) 50 mg DAILY PO  Last administered on 11/18/21at 

08:41;  Start 11/17/21 at 09:00;  Stop 11/18/21 at 14:47;  Status DC


Hydrochlorothiazide (Microzide) 12.5 mg DAILY PO  Last administered on 

11/24/21at 08:00;  Start 11/17/21 at 09:00


Acetaminophen/ Hydrocodone Bitart (Lortab 7.5/325) 2 tab PRN Q4HRS  PRN PO PAIN 

Last administered on 11/17/21at 10:25;  Start 11/16/21 at 16:00;  Stop 11/17/21 

at 13:34;  Status DC


Lisinopril (Prinivil) 20 mg DAILY PO ;  Start 11/17/21 at 09:00;  Stop 11/17/21 

at 06:31;  Status DC


Methocarbamol (Robaxin) 750 mg TID PRN  PRN PO MUSCLE SPASMS Last administered 

on 11/23/21at 20:03;  Start 11/16/21 at 16:00


Atorvastatin Calcium (Lipitor) 80 mg DAILY PO  Last administered on 11/24/21at 

08:00;  Start 11/17/21 at 09:00


Non-Formulary Medication (Empagliflozin (Jardiance)) 25 mg DAILY PO ;  Start 

11/17/21 at 09:00;  Stop 11/17/21 at 18:17;  Status DC


Febuxostat (Uloric) 80 mg DAILY PO  Last administered on 11/24/21at 07:57;  

Start 11/17/21 at 09:00


Non-Formulary Medication (Icosapent Ethyl (Vascepa)) 1 gm BID PO ;  Start 

11/16/21 at 21:00;  Stop 11/17/21 at 18:18;  Status DC


Metoprolol Tartrate (Lopressor) 50 mg BID PO  Last administered on 11/24/21at 

07:58;  Start 11/16/21 at 21:00


Probenecid (Benemid) 500 mg BID PO  Last administered on 11/24/21at 07:57;  

Start 11/16/21 at 21:00


Non-Formulary Medication (Semaglutide (Ozempic)) 1 mg WEEKLY SQ ;  Start 

11/23/21 at 09:00;  Status UNV


Zolpidem Tartrate (Ambien) 5 mg PRN QHS  PRN PO INSOMNIA, MAY REPEAT X1 Last 

administered on 11/17/21at 21:00;  Start 11/16/21 at 16:15


Insulin Human Lispro (HumaLOG) 0-9 UNITS TIDWMEALS SQ  Last administered on 

11/24/21at 09:20;  Start 11/16/21 at 00:00


Dextrose (Dextrose 50%-Water Syringe) 12.5 gm PRN Q15MIN  PRN IV SEE COMMENTS;  

Start 11/16/21 at 20:45


Diphenhydramine HCl (Benadryl) 50 mg PRN Q6HRS  PRN IVP ITCHING, 1ST CHOICE Last

administered on 11/23/21at 20:05;  Start 11/17/21 at 03:00


Diphenhydramine HCl (Benadryl) 100 mg PRN Q6HRS  PRN IVP ITCHING, 2ND CHOICE 

Last administered on 11/23/21at 12:48;  Start 11/17/21 at 03:00


Morphine Sulfate (Morphine Sulfate) 2 mg PRN Q2HR  PRN IVP PAIN Last 

administered on 11/23/21at 12:45;  Start 11/17/21 at 03:00


Famotidine (Pepcid Vial) 20 mg BID IVP  Last administered on 11/22/21at 09:00;  

Start 11/17/21 at 07:00;  Stop 11/22/21 at 12:26;  Status DC


Methylprednisolone Sodium Succinate (SOLU-Medrol 40MG VIAL) 40 mg Q12HR IV  Last

administered on 11/19/21at 08:02;  Start 11/17/21 at 07:00;  Stop 11/19/21 at 

09:43;  Status DC


Daptomycin 640 mg/ Sodium Chloride 50 ml @  100 mls/hr Q24H IV  Last 

administered on 11/17/21at 11:01;  Start 11/17/21 at 10:00;  Stop 11/17/21 at 

14:59;  Status DC


Piperacillin Sod/ Tazobactam Sod 3.375 gm/Sodium Chloride 50 ml @  100 mls/hr 

Q6HRS IV  Last administered on 11/19/21at 06:16;  Start 11/17/21 at 10:00;  Stop

11/19/21 at 08:23;  Status DC


Lactobacillus Rhamnosus (Culturelle) 1 cap BID PO  Last administered on 

11/24/21at 07:59;  Start 11/17/21 at 21:00


Oxycodone/ Acetaminophen (Percocet 5/325) 1 tab PRN Q4HRS  PRN PO MODERATE PAIN;

 Start 11/17/21 at 13:45;  Stop 11/22/21 at 10:13;  Status DC


Oxycodone/ Acetaminophen (Percocet 5/325) 2 tab PRN Q4HRS  PRN PO SEVERE PAIN 

Last administered on 11/22/21at 08:14;  Start 11/17/21 at 13:45;  Stop 11/22/21 

at 10:13;  Status DC


Daptomycin 500 mg/ Sodium Chloride 50 ml @  100 mls/hr Q24H IV  Last 

administered on 11/20/21at 09:03;  Start 11/18/21 at 09:00;  Stop 11/20/21 at 

12:51;  Status DC


Labetalol HCl (Normodyne Iv Push) 10 mg PRN Q15MIN  PRN IVP HYPERTENSION Last 

administered on 11/22/21at 23:39;  Start 11/18/21 at 00:00


Ziprasidone (Geodon) 20 mg PRN Q2HRS  PRN PO ANXIETY / AGITATION Last 

administered on 11/19/21at 10:10;  Start 11/18/21 at 00:00


Hydralazine HCl (Apresoline) 25 mg TID PO  Last administered on 11/19/21at 

08:01;  Start 11/18/21 at 00:30;  Stop 11/19/21 at 09:37;  Status DC


Insulin Human Lispro (HumaLOG) 5 units 1X  ONCE SQ  Last administered on 

11/18/21at 00:19;  Start 11/18/21 at 00:30;  Stop 11/18/21 at 00:31;  Status DC


Ziprasidone (Geodon Im) 10 mg PRN Q2HRS  PRN IM AGITATION Last administered on 

11/18/21at 06:05;  Start 11/18/21 at 00:15


Lidocaine HCl (Buffered Lidocaine 1%) 3 ml STK-MED ONCE .ROUTE ;  Start 11/18/21

at 12:11;  Stop 11/18/21 at 12:11;  Status DC


Lidocaine HCl (Buffered Lidocaine 1%) 3 ml STK-MED ONCE .ROUTE ;  Start 11/18/21

at 12:21;  Stop 11/18/21 at 12:22;  Status DC


Lidocaine HCl (Buffered Lidocaine 1%) 6 ml 1X  ONCE INJ  Last administered on 

11/18/21at 13:10;  Start 11/18/21 at 12:30;  Stop 11/18/21 at 12:34;  Status DC


Gadoterate Meglumine (Clariscan) 20 ml 1X  ONCE IVP  Last administered on 

11/18/21at 15:55;  Start 11/18/21 at 15:30;  Stop 11/18/21 at 15:31;  Status DC


Insulin Human Lispro (HumaLOG) 12 units 1X  ONCE SQ  Last administered on 

11/18/21at 22:05;  Start 11/18/21 at 22:00;  Stop 11/18/21 at 22:01;  Status DC


Hydralazine HCl (Apresoline) 50 mg TID PO  Last administered on 11/23/21at 

08:42;  Start 11/19/21 at 14:00;  Stop 11/23/21 at 11:34;  Status DC


Insulin Glargine (Lantus Syringe) 10 unit BID SQ  Last administered on 

11/20/21at 23:06;  Start 11/19/21 at 11:00;  Stop 11/21/21 at 08:13;  Status DC


Prednisone (Prednisone) 40 mg DAILY PO  Last administered on 11/20/21at 08:31;  

Start 11/20/21 at 09:00;  Stop 11/20/21 at 09:15;  Status DC


Insulin Human Lispro (HumaLOG) 5 units TIDWMEALS SQ  Last administered on 

11/20/21at 17:00;  Start 11/19/21 at 17:00;  Stop 11/21/21 at 08:13;  Status DC


Polyethylene Glycol (miraLAX PACKET) 17 gm PRN Q4HRS  PRN PO CONSTIPATION Last 

administered on 11/24/21at 09:21;  Start 11/19/21 at 16:15


Insulin Human Lispro (HumaLOG) 20 units 1X  ONCE SQ  Last administered on 

11/19/21at 18:18;  Start 11/19/21 at 18:00;  Stop 11/19/21 at 18:03;  Status DC


Prednisone (Prednisone) 20 mg DAILY PO  Last administered on 11/23/21at 08:40;  

Start 11/21/21 at 09:00;  Stop 11/23/21 at 11:34;  Status DC


Amlodipine Besylate (Norvasc) 5 mg DAILY PO  Last administered on 11/21/21at 

07:39;  Start 11/20/21 at 09:15;  Stop 11/21/21 at 08:12;  Status DC


Cefazolin Sodium/ Dextrose 50 ml @  100 mls/hr Q8HRS IV  Last administered on 

11/23/21at 05:04;  Start 11/20/21 at 14:00;  Stop 11/23/21 at 10:36;  Status DC


Methylprednisolone Sodium Succinate (SOLU-Medrol 125MG VIAL) 125 mg 1X  ONCE IV 

Last administered on 11/20/21at 14:59;  Start 11/20/21 at 14:30;  Stop 11/20/21 

at 14:31;  Status DC


Amlodipine Besylate (Norvasc) 10 mg DAILY PO  Last administered on 11/22/21at 

08:15;  Start 11/21/21 at 09:00;  Stop 11/22/21 at 12:23;  Status DC


Insulin Glargine (Lantus Syringe) 15 unit BID SQ  Last administered on 

11/22/21at 10:20;  Start 11/21/21 at 09:00;  Stop 11/22/21 at 12:33;  Status DC


Insulin Human Lispro (HumaLOG) 10 units TIDWMEALS SQ  Last administered on 

11/22/21at 12:41;  Start 11/21/21 at 12:00;  Stop 11/22/21 at 17:53;  Status DC


Oxycodone HCl (Roxicodone) 5 mg PRN Q4HRS  PRN PO PAIN;  Start 11/22/21 at 10:15


Oxycodone HCl (Roxicodone) 10 mg PRN Q4HRS  PRN PO PAIN Last administered on 

11/24/21at 07:58;  Start 11/22/21 at 10:30


Amlodipine Besylate (Norvasc) 10 mg DAILY PO  Last administered on 11/24/21at 

08:00;  Start 11/23/21 at 09:00


Famotidine (Pepcid) 20 mg BID PO  Last administered on 11/24/21at 08:00;  Start 

11/22/21 at 21:00


Insulin Glargine (Lantus Syringe) 20 unit BID SQ  Last administered on 

11/24/21at 09:00;  Start 11/22/21 at 21:00


Insulin Human Regular (HumuLIN R VIAL) 15 unit 1X  ONCE IV ;  Start 11/22/21 at 

13:00;  Stop 11/22/21 at 13:01;  Status DC


Insulin Human Lispro (HumaLOG) 15 units 1X  ONCE SQ ;  Start 11/22/21 at 13:00; 

Stop 11/22/21 at 13:01;  Status DC


Insulin Human Lispro (HumaLOG) 20 units TIDWMEALS SQ  Last administered on 

11/24/21at 09:19;  Start 11/22/21 at 18:00


Daptomycin 640 mg/ Sodium Chloride 50 ml @  100 mls/hr Q24H IV  Last adm

inistered on 11/23/21at 12:47;  Start 11/23/21 at 12:00


Hydralazine HCl (Apresoline) 75 mg TID PO  Last administered on 11/24/21at 

07:59;  Start 11/23/21 at 11:45


Cetirizine HCl (ZyrTEC) 10 mg DAILY PO  Last administered on 11/24/21at 07:58;  

Start 11/23/21 at 16:00





Active Scripts


Active


Colace (Docusate Sodium) 100 Mg Capsule 100 Mg PO BID 60 Days


Methocarbamol 750 Mg Tablet 750 Mg PO TID PRN PRN


Reported


[V-Go 40 Day Kit ]   Units SQ Q1HR


     5 CLICKS @ MEALS


     2 CLICKS @ SNACKS


Freestyle Lite Test Strip (Blood Sugar Diagnostic) 1 Each Strip 1 Strip MC DAILY

30 Days


Hydrocodone-Apap 7.5-325  ** (Hydrocodone Bit/Acetaminophen) 1 Tab Tablet 1-2 

Tab PO PRN Q4HRS PRN


Vascepa (Icosapent Ethyl) 1 Gm Capsule 1 Gm PO BID


Zolpidem Tartrate Er (Zolpidem Tartrate) 12.5 Mg Tab.mphase 12.5 Mg PO PRN QHS 

PRN


Probenecid 500 Mg Tablet 500 Mg PO BID


Hydrochlorothiazide Capsule  ** (Hydrochlorothiazide) 12.5 Mg Capsule 12.5 Mg PO

DAILY


Hydralazine Hcl 50 Mg Tablet 1 Tab PO DAILY


Uloric (Febuxostat) 80 Mg Tablet 1 Tab PO DAILY 30 Days


Clonidine Tts-2  ** (Clonidine) 1 Each Patch.tdwk 1 Patch TD WEEKLY


Bystolic (Nebivolol Hcl) 20 Mg Tablet 20 Mg PO DAILY


Aspirin 81 Mg Tab.chew 1 Tab PO DAILY


Zetia (Ezetimibe) 10 Mg Tablet 10 Mg PO DAILY


Zestril (Lisinopril) 20 Mg Tablet 1 Tab PO DAILY 30 Days


Ozempic (Semaglutide) 1 Mg/0.75 Ml Pen.injctr 1 Mg SQ WEEKLY


     SATURDAY


Lipitor (Atorvastatin Calcium) 80 Mg Tablet 1 Tab PO DAILY


Jardiance (Empagliflozin) 25 Mg Tablet 25 Mg PO DAILY





Allergies


Allergies:  


Coded Allergies:  


     ACE Inhibitors (Verified  Allergy, Severe, ANGIOEDEMA, 11/18/21)


   swelling


     dulaglutide (Verified  Allergy, Intermediate, Rash, 11/4/21)


     fentanyl (Verified  Allergy, Intermediate, Swelling, 11/17/21)


   + Hives





ROS


Review of System


FULL ROS NEG EXCEPT FOR BACK PAIN AND AS BELOW


General:  YES: Fatigue


Eyes:  Yes Decreased vision





Physical Exam


General:  Alert, Oriented X3, Cooperative, No acute distress


HEENT:  Atraumatic


Lungs:  Clear to auscultation


Heart:  Regular rate


Abdomen:  Normal bowel sounds, Soft


Extremities:  No clubbing, No cyanosis


Skin:  No breakdown


Neuro:  Normal speech


Psych/Mental Status:  Mental status NL, Mood NL


MUSCULOSKELETAL:  No joint tenderness, No deformity





Vitals


VITALS





Vital Signs








  Date Time  Temp Pulse Resp B/P (MAP) Pulse Ox O2 Delivery O2 Flow Rate FiO2


 


11/24/21 08:28     97 Room Air  


 


11/24/21 08:00    200/111    


 


11/24/21 07:00 97.8 79 18     





 97.8       











Labs


Labs





Laboratory Tests








Test


 11/22/21


11:35 11/22/21


17:24 11/22/21


21:30 11/23/21


05:30


 


Glucose (Fingerstick)


 452 mg/dL


(70-99) 428 mg/dL


(70-99) 276 mg/dL


(70-99) 





 


Sodium Level


 


 


 


 135 mmol/L


(136-145)


 


Potassium Level


 


 


 


 4.0 mmol/L


(3.5-5.1)


 


Chloride Level


 


 


 


 95 mmol/L


()


 


Carbon Dioxide Level


 


 


 


 32 mmol/L


(21-32)


 


Anion Gap    8 (6-14) 


 


Blood Urea Nitrogen


 


 


 


 25 mg/dL


(8-26)


 


Creatinine


 


 


 


 1.3 mg/dL


(0.7-1.3)


 


Estimated GFR


(Cockcroft-Gault) 


 


 


 57.3 





 


Glucose Level


 


 


 


 196 mg/dL


(70-99)


 


Calcium Level


 


 


 


 8.7 mg/dL


(8.5-10.1)


 


Test


 11/23/21


06:30 11/23/21


07:10 11/23/21


11:25 11/23/21


16:44


 


White Blood Count


 12.6 x10^3/uL


(4.0-11.0) 


 


 





 


Red Blood Count


 6.00 x10^6/uL


(4.30-5.70) 


 


 





 


Hemoglobin


 17.3 g/dL


(13.0-17.5) 


 


 





 


Hematocrit


 52.7 %


(39.0-53.0) 


 


 





 


Mean Corpuscular Volume 88 fL ()    


 


Mean Corpuscular Hemoglobin 29 pg (25-35)    


 


Mean Corpuscular Hemoglobin


Concent 33 g/dL


(31-37) 


 


 





 


Red Cell Distribution Width


 14.7 %


(11.5-14.5) 


 


 





 


Platelet Count


 160 x10^3/uL


(140-400) 


 


 





 


Neutrophils (%) (Auto) 74 % (31-73)    


 


Lymphocytes (%) (Auto) 18 % (24-48)    


 


Monocytes (%) (Auto) 6 % (0-9)    


 


Eosinophils (%) (Auto) 2 % (0-3)    


 


Basophils (%) (Auto) 0 % (0-3)    


 


Neutrophils # (Auto)


 9.3 x10^3/uL


(1.8-7.7) 


 


 





 


Lymphocytes # (Auto)


 2.2 x10^3/uL


(1.0-4.8) 


 


 





 


Monocytes # (Auto)


 0.8 x10^3/uL


(0.0-1.1) 


 


 





 


Eosinophils # (Auto)


 0.2 x10^3/uL


(0.0-0.7) 


 


 





 


Basophils # (Auto)


 0.1 x10^3/uL


(0.0-0.2) 


 


 





 


Glucose (Fingerstick)


 


 202 mg/dL


(70-99) 206 mg/dL


(70-99) 197 mg/dL


(70-99)


 


Test


 11/23/21


20:52 11/24/21


04:45 11/24/21


07:27 11/24/21


10:41


 


Glucose (Fingerstick)


 297 mg/dL


(70-99) 


 179 mg/dL


(70-99) 307 mg/dL


(70-99)


 


White Blood Count


 


 12.6 x10^3/uL


(4.0-11.0) 


 





 


Red Blood Count


 


 5.60 x10^6/uL


(4.30-5.70) 


 





 


Hemoglobin


 


 16.4 g/dL


(13.0-17.5) 


 





 


Hematocrit


 


 49.3 %


(39.0-53.0) 


 





 


Mean Corpuscular Volume  88 fL ()   


 


Mean Corpuscular Hemoglobin  29 pg (25-35)   


 


Mean Corpuscular Hemoglobin


Concent 


 33 g/dL


(31-37) 


 





 


Red Cell Distribution Width


 


 14.5 %


(11.5-14.5) 


 





 


Platelet Count


 


 214 x10^3/uL


(140-400) 


 





 


Neutrophils (%) (Auto)  78 % (31-73)   


 


Lymphocytes (%) (Auto)  14 % (24-48)   


 


Monocytes (%) (Auto)  6 % (0-9)   


 


Eosinophils (%) (Auto)  1 % (0-3)   


 


Basophils (%) (Auto)  0 % (0-3)   


 


Neutrophils # (Auto)


 


 9.9 x10^3/uL


(1.8-7.7) 


 





 


Lymphocytes # (Auto)


 


 1.8 x10^3/uL


(1.0-4.8) 


 





 


Monocytes # (Auto)


 


 0.7 x10^3/uL


(0.0-1.1) 


 





 


Eosinophils # (Auto)


 


 0.2 x10^3/uL


(0.0-0.7) 


 





 


Basophils # (Auto)


 


 0.0 x10^3/uL


(0.0-0.2) 


 





 


Sodium Level


 


 135 mmol/L


(136-145) 


 





 


Potassium Level


 


 3.9 mmol/L


(3.5-5.1) 


 





 


Chloride Level


 


 96 mmol/L


() 


 





 


Carbon Dioxide Level


 


 32 mmol/L


(21-32) 


 





 


Anion Gap  7 (6-14)   


 


Blood Urea Nitrogen


 


 29 mg/dL


(8-26) 


 





 


Creatinine


 


 1.5 mg/dL


(0.7-1.3) 


 





 


Estimated GFR


(Cockcroft-Gault) 


 48.6 


 


 





 


Glucose Level


 


 242 mg/dL


(70-99) 


 





 


Calcium Level


 


 8.5 mg/dL


(8.5-10.1) 


 











Laboratory Tests








Test


 11/23/21


11:25 11/23/21


16:44 11/23/21


20:52 11/24/21


04:45


 


Glucose (Fingerstick)


 206 mg/dL


(70-99) 197 mg/dL


(70-99) 297 mg/dL


(70-99) 





 


White Blood Count


 


 


 


 12.6 x10^3/uL


(4.0-11.0)


 


Red Blood Count


 


 


 


 5.60 x10^6/uL


(4.30-5.70)


 


Hemoglobin


 


 


 


 16.4 g/dL


(13.0-17.5)


 


Hematocrit


 


 


 


 49.3 %


(39.0-53.0)


 


Mean Corpuscular Volume    88 fL () 


 


Mean Corpuscular Hemoglobin    29 pg (25-35) 


 


Mean Corpuscular Hemoglobin


Concent 


 


 


 33 g/dL


(31-37)


 


Red Cell Distribution Width


 


 


 


 14.5 %


(11.5-14.5)


 


Platelet Count


 


 


 


 214 x10^3/uL


(140-400)


 


Neutrophils (%) (Auto)    78 % (31-73) 


 


Lymphocytes (%) (Auto)    14 % (24-48) 


 


Monocytes (%) (Auto)    6 % (0-9) 


 


Eosinophils (%) (Auto)    1 % (0-3) 


 


Basophils (%) (Auto)    0 % (0-3) 


 


Neutrophils # (Auto)


 


 


 


 9.9 x10^3/uL


(1.8-7.7)


 


Lymphocytes # (Auto)


 


 


 


 1.8 x10^3/uL


(1.0-4.8)


 


Monocytes # (Auto)


 


 


 


 0.7 x10^3/uL


(0.0-1.1)


 


Eosinophils # (Auto)


 


 


 


 0.2 x10^3/uL


(0.0-0.7)


 


Basophils # (Auto)


 


 


 


 0.0 x10^3/uL


(0.0-0.2)


 


Sodium Level


 


 


 


 135 mmol/L


(136-145)


 


Potassium Level


 


 


 


 3.9 mmol/L


(3.5-5.1)


 


Chloride Level


 


 


 


 96 mmol/L


()


 


Carbon Dioxide Level


 


 


 


 32 mmol/L


(21-32)


 


Anion Gap    7 (6-14) 


 


Blood Urea Nitrogen


 


 


 


 29 mg/dL


(8-26)


 


Creatinine


 


 


 


 1.5 mg/dL


(0.7-1.3)


 


Estimated GFR


(Cockcroft-Gault) 


 


 


 48.6 





 


Glucose Level


 


 


 


 242 mg/dL


(70-99)


 


Calcium Level


 


 


 


 8.5 mg/dL


(8.5-10.1)


 


Test


 11/24/21


07:27 11/24/21


10:41 


 





 


Glucose (Fingerstick)


 179 mg/dL


(70-99) 307 mg/dL


(70-99) 


 














Assessment/Plan


Assessment/Plan


IMP





CKD STAGE 3 A - CR STABLE AT 1.5


LABILE HTN


DM II


POST SURGICAL SPINE INFECTION





PLAN





FOR NOW WILL STOP CATAPRESS PATCH


WILL START PO CLONIDINE


OP F/U


D/C PLANS NOTED


D/W GER PUENTES MD                 Nov 24, 2021 11:13

## 2021-11-24 NOTE — PDOC3
Discharge Summary


Visit Information


Date of Admission:  Nov 16, 2021


Date of Discharge:  Nov 24, 2021


Admitting Diagnosis:  MSSA wound


Final Diagnosis


MSSA wound





Brief Hospital Course


Allergies





                                    Allergies








Coded Allergies Type Severity Reaction Last Updated Verified


 


  ACE Inhibitors Allergy Severe ANGIOEDEMA 11/18/21 Yes


 


  dulaglutide Allergy Intermediate Rash 11/4/21 Yes


 


  fentanyl Allergy Intermediate Swelling 11/17/21 Yes








Vital Signs





Vital Signs








  Date Time  Temp Pulse Resp B/P (MAP) Pulse Ox O2 Delivery O2 Flow Rate FiO2


 


11/24/21 11:23  78  130/79    


 


11/24/21 11:00 98.1  16  97 Room Air  





 98.1       








Lab Results





Laboratory Tests








Test


 11/22/21


17:24 11/22/21


21:30 11/23/21


05:30 11/23/21


06:30


 


Glucose (Fingerstick)


 428 mg/dL


(70-99) 276 mg/dL


(70-99) 


 





 


Sodium Level


 


 


 135 mmol/L


(136-145) 





 


Potassium Level


 


 


 4.0 mmol/L


(3.5-5.1) 





 


Chloride Level


 


 


 95 mmol/L


() 





 


Carbon Dioxide Level


 


 


 32 mmol/L


(21-32) 





 


Anion Gap   8 (6-14)  


 


Blood Urea Nitrogen


 


 


 25 mg/dL


(8-26) 





 


Creatinine


 


 


 1.3 mg/dL


(0.7-1.3) 





 


Estimated GFR


(Cockcroft-Gault) 


 


 57.3 


 





 


Glucose Level


 


 


 196 mg/dL


(70-99) 





 


Calcium Level


 


 


 8.7 mg/dL


(8.5-10.1) 





 


White Blood Count


 


 


 


 12.6 x10^3/uL


(4.0-11.0)


 


Red Blood Count


 


 


 


 6.00 x10^6/uL


(4.30-5.70)


 


Hemoglobin


 


 


 


 17.3 g/dL


(13.0-17.5)


 


Hematocrit


 


 


 


 52.7 %


(39.0-53.0)


 


Mean Corpuscular Volume    88 fL () 


 


Mean Corpuscular Hemoglobin    29 pg (25-35) 


 


Mean Corpuscular Hemoglobin


Concent 


 


 


 33 g/dL


(31-37)


 


Red Cell Distribution Width


 


 


 


 14.7 %


(11.5-14.5)


 


Platelet Count


 


 


 


 160 x10^3/uL


(140-400)


 


Neutrophils (%) (Auto)    74 % (31-73) 


 


Lymphocytes (%) (Auto)    18 % (24-48) 


 


Monocytes (%) (Auto)    6 % (0-9) 


 


Eosinophils (%) (Auto)    2 % (0-3) 


 


Basophils (%) (Auto)    0 % (0-3) 


 


Neutrophils # (Auto)


 


 


 


 9.3 x10^3/uL


(1.8-7.7)


 


Lymphocytes # (Auto)


 


 


 


 2.2 x10^3/uL


(1.0-4.8)


 


Monocytes # (Auto)


 


 


 


 0.8 x10^3/uL


(0.0-1.1)


 


Eosinophils # (Auto)


 


 


 


 0.2 x10^3/uL


(0.0-0.7)


 


Basophils # (Auto)


 


 


 


 0.1 x10^3/uL


(0.0-0.2)


 


Test


 11/23/21


07:10 11/23/21


11:25 11/23/21


16:44 11/23/21


20:52


 


Glucose (Fingerstick)


 202 mg/dL


(70-99) 206 mg/dL


(70-99) 197 mg/dL


(70-99) 297 mg/dL


(70-99)


 


Test


 11/24/21


04:45 11/24/21


07:27 11/24/21


10:41 





 


White Blood Count


 12.6 x10^3/uL


(4.0-11.0) 


 


 





 


Red Blood Count


 5.60 x10^6/uL


(4.30-5.70) 


 


 





 


Hemoglobin


 16.4 g/dL


(13.0-17.5) 


 


 





 


Hematocrit


 49.3 %


(39.0-53.0) 


 


 





 


Mean Corpuscular Volume 88 fL ()    


 


Mean Corpuscular Hemoglobin 29 pg (25-35)    


 


Mean Corpuscular Hemoglobin


Concent 33 g/dL


(31-37) 


 


 





 


Red Cell Distribution Width


 14.5 %


(11.5-14.5) 


 


 





 


Platelet Count


 214 x10^3/uL


(140-400) 


 


 





 


Neutrophils (%) (Auto) 78 % (31-73)    


 


Lymphocytes (%) (Auto) 14 % (24-48)    


 


Monocytes (%) (Auto) 6 % (0-9)    


 


Eosinophils (%) (Auto) 1 % (0-3)    


 


Basophils (%) (Auto) 0 % (0-3)    


 


Neutrophils # (Auto)


 9.9 x10^3/uL


(1.8-7.7) 


 


 





 


Lymphocytes # (Auto)


 1.8 x10^3/uL


(1.0-4.8) 


 


 





 


Monocytes # (Auto)


 0.7 x10^3/uL


(0.0-1.1) 


 


 





 


Eosinophils # (Auto)


 0.2 x10^3/uL


(0.0-0.7) 


 


 





 


Basophils # (Auto)


 0.0 x10^3/uL


(0.0-0.2) 


 


 





 


Sodium Level


 135 mmol/L


(136-145) 


 


 





 


Potassium Level


 3.9 mmol/L


(3.5-5.1) 


 


 





 


Chloride Level


 96 mmol/L


() 


 


 





 


Carbon Dioxide Level


 32 mmol/L


(21-32) 


 


 





 


Anion Gap 7 (6-14)    


 


Blood Urea Nitrogen


 29 mg/dL


(8-26) 


 


 





 


Creatinine


 1.5 mg/dL


(0.7-1.3) 


 


 





 


Estimated GFR


(Cockcroft-Gault) 48.6 


 


 


 





 


Glucose Level


 242 mg/dL


(70-99) 


 


 





 


Calcium Level


 8.5 mg/dL


(8.5-10.1) 


 


 





 


Glucose (Fingerstick)


 


 179 mg/dL


(70-99) 307 mg/dL


(70-99) 











Laboratory Tests








Test


 11/23/21


16:44 11/23/21


20:52 11/24/21


04:45 11/24/21


07:27


 


Glucose (Fingerstick)


 197 mg/dL


(70-99) 297 mg/dL


(70-99) 


 179 mg/dL


(70-99)


 


White Blood Count


 


 


 12.6 x10^3/uL


(4.0-11.0) 





 


Red Blood Count


 


 


 5.60 x10^6/uL


(4.30-5.70) 





 


Hemoglobin


 


 


 16.4 g/dL


(13.0-17.5) 





 


Hematocrit


 


 


 49.3 %


(39.0-53.0) 





 


Mean Corpuscular Volume   88 fL ()  


 


Mean Corpuscular Hemoglobin   29 pg (25-35)  


 


Mean Corpuscular Hemoglobin


Concent 


 


 33 g/dL


(31-37) 





 


Red Cell Distribution Width


 


 


 14.5 %


(11.5-14.5) 





 


Platelet Count


 


 


 214 x10^3/uL


(140-400) 





 


Neutrophils (%) (Auto)   78 % (31-73)  


 


Lymphocytes (%) (Auto)   14 % (24-48)  


 


Monocytes (%) (Auto)   6 % (0-9)  


 


Eosinophils (%) (Auto)   1 % (0-3)  


 


Basophils (%) (Auto)   0 % (0-3)  


 


Neutrophils # (Auto)


 


 


 9.9 x10^3/uL


(1.8-7.7) 





 


Lymphocytes # (Auto)


 


 


 1.8 x10^3/uL


(1.0-4.8) 





 


Monocytes # (Auto)


 


 


 0.7 x10^3/uL


(0.0-1.1) 





 


Eosinophils # (Auto)


 


 


 0.2 x10^3/uL


(0.0-0.7) 





 


Basophils # (Auto)


 


 


 0.0 x10^3/uL


(0.0-0.2) 





 


Sodium Level


 


 


 135 mmol/L


(136-145) 





 


Potassium Level


 


 


 3.9 mmol/L


(3.5-5.1) 





 


Chloride Level


 


 


 96 mmol/L


() 





 


Carbon Dioxide Level


 


 


 32 mmol/L


(21-32) 





 


Anion Gap   7 (6-14)  


 


Blood Urea Nitrogen


 


 


 29 mg/dL


(8-26) 





 


Creatinine


 


 


 1.5 mg/dL


(0.7-1.3) 





 


Estimated GFR


(Cockcroft-Gault) 


 


 48.6 


 





 


Glucose Level


 


 


 242 mg/dL


(70-99) 





 


Calcium Level


 


 


 8.5 mg/dL


(8.5-10.1) 





 


Test


 11/24/21


10:41 


 


 





 


Glucose (Fingerstick)


 307 mg/dL


(70-99) 


 


 











Brief Hospital Course


56-year-old male who underwent lumbar hemilaminectomy of the L3-L4 levels on 

November 3, 2021.  He was actually discharged home the following day was doing 

well.  Unfortunately patient had a surgical site complication with infection he 

was started on Keflex and failed p.o. antibiotics over the weekend.  There was 

increasing pain and serous drainage from incision and he was brought in for 

further evaluation and IV antibiotics.  He was given fentanyl for pain and 

patient was on lisinopril chronically.  Unfortunately patient had angioedema 

most likely with his use of ACE inhibitors.  Infectious diseases consulted for 

antibiotic management.  Patient interviewed bedside and he was doing well.  He 

denies any throat swelling or wheezing or trouble breathing.  He does not have 

any periorbital edema or facial swelling.  Only upper portion of his lip is 

swollen and his tongue is fine.  He is speaking in full sentences without any 

use of his accessory muscles.  Denies fevers, confusion, syncope, chest pain, 

shortness of breath, abdominal pain, diarrhea or hematuria.





11/18/2021


Patient had an acute agitative and confusion episode last night.  Patient ripped

out the sharps container from the wall.  He appears to be close to his baseline 

today and appears lethargic but talking on the phone.  Sitting on the recliner 

and not agitated at this time.  No fevers somewhat tachycardic and no 

hypotensive episodes.  Swelling of his upper lip appears to be improved.  No 

redness or swelling.  Patient will likely benefit from a PICC line placement for

long-term IV antibiotics.  Will defer the timing and course to infectious 

disease.  Patient's chart, labs, images were reviewed and discussed with RN





11/19/2021


No acute events overnight.  Patient is ambulating through the hallways back 

pain.  There is clear drainage on the dressings which will need to be switched 

out.  Blood pressures have been uncontrolled with systolic blood pressures from 

1 60-1 90.  He does have IV antihypertensive medications as needed.  I have 

increased his hydralazine to 50 mg 3 times daily.  His sugars have also been 

greater than 300 and as high as 400.  I have added a long-acting Lantus 10 units

twice daily and keeping him on regular insulin sliding scale.  Patient usually 

does have an insulin pump in which he had his friend bring.  However due to his 

staff aureus infection I would recommend him not to place the insulin pump at 

this time and only administer insulin as needed and do subcu injections.  I have

also decreased his Solu-Medrol to a prednisone taper at 40 mg daily for now.





11/20/2021


No acute events overnight.  Patient seen and examined bedside.  Resting 

comfortably.  Appears pain is well controlled.  Blood pressures have improved.  

Will decrease his prednisone to 20 mg daily.  Continue his 5 units 3 times daily

with meals of lispro and long-acting glargine 10 units twice daily with sliding 

scale high intensity.  Patient's chart, labs, images were reviewed and discussed

with RN





11/21/2021


No acute events overnight.  Patient yesterday had some swelling around the eyes 

and I gave a dose of pulse IV steroids.  Swelling and irritation or hives around

the periorbital region has improved.  Patient has some drug-seeking behavior 

which she was requiring only IV pain medications.  Patient seen and examined 

bedside.  Patient was using his cell phone at the time and appeared to be 

content.  Not in any active pain at the time.  No acute agitative episodes 

overnight.  Wound is appearing improved without much drainage.  Continue to 

taper steroids and continued IV antibiotics per ID.  I have increased her 

glargine to 15 units twice daily and increased his premale lispro to 10 units 

before meals.  Patient's chart, labs, images were reviewed and discussed with RN





11/23: No acute events overnight.  Patient seen and examined bedside.  Resting 

comfortably in bed.  Blood pressures continues to be elevated in the 170s to 

160s systolics.  I have increased his hydralazine to 75 mg 3 times daily.  I 

have stopped his prednisone.  Sugars have been better controlled.  





11/24: No overnight events.  Blood pressure improved discussed with nephrology 

to change to 0.2 mg twice daily of clonidine and to follow-up with nephrology 

outpatient. Patient without complaints. Wishes for d/c today, has home 

antibiotics set up, has previously had PICC and home antibiotics.





Consults: nephrology, neurosurgery, ID





Problem list:


ACE inhibitor induced angioedema with normal C4 and C1 esterase levels


Postoperative wound infection


MSSA wound - 


Diabetes mellitus.


Hypertension.


Leukocytosis.


Renal insufficiency - follow up nephrology outpatient





Okay with discharge to home with home health


Recommend to continue prednisone for another 2 days at 20 mg daily.  No need for

further tapering as patient was only on high-dose steroids for less than a week


Recommend to continue with HCTZ, amlodipine, metoprolol and clonidine for blood 

pressure control.  Discussed with nephrology to stop clonidine patch and changed

to 0.2 mg twice daily clonidine tablets on discharge


Okay to continue with insulin pump as long as there is no other concerns for 

other sources of infection infection, especially in the skin or soft tissue area


Would avoid ACE inhibitor for now, and may consider possibly ARB in the future 

if blood pressure still poorly controlled as patient is a diabetic and would 

benefit significantly from a ARB for kidney protection





Greater than 30 minutes spent on d/c





Discharge Information


Condition at Discharge:  Improved


Follow Up:  Weeks


Disposition/Orders:  D/C to Home w/ HH


Scheduled


Amlodipine Besylate (Amlodipine Besylate) 10 Mg Tablet, 10 MG PO DAILY for HTN 

for 30 Days, #30 Ref 2


   Prescribed by: AMY CRUZ MD on 11/24/21 1213


Aspirin (Aspirin) 81 Mg Tab.chew, 1 TAB PO DAILY for thin blood, #30 Ref 3 

(Reported)


   Entered as Reported by: DAISY BLUM on 10/29/20 1515


   Last Action: Continued on 11/16/21 1557 by ANTOINETTE WILSON


Atorvastatin Calcium (Lipitor) 80 Mg Tablet, 1 TAB PO DAILY for control 

cholesterol, #30 Ref 5 (Reported)


   Entered as Reported by: DAISY BLUM on 10/29/20 1458


   Last Action: Converted on 11/16/21 1557 by ANTOINETTE WILSON


Blood Sugar Diagnostic (Freestyle Lite Test Strip) 1 Each Strip, 1 STRIP MC 

DAILY for CHECK BLOOD SUGAR for 30 Days, #30 Ref 0 (Reported)


   Entered as Reported by: Luis Jones on 10/27/21 1326


Clonidine Hcl (Clonidine Hcl) 0.2 Mg Tablet, 1 TAB PO BID for HTN for 30 Days, 

#60 Ref 2


   Prescribed by: AMY CRUZ MD on 11/24/21 1213


Daptomycin (Daptomycin) 350 Mg Vial, 600 MG IV DAILY for Spine staph infection 

for 42 Days, #72


   Prescribed by: AMY CRUZ MD on 11/24/21 1213


Docusate Sodium (Colace) 100 Mg Capsule, 100 MG PO BID for constipation for 60 

Days, #120


   Prescribed by: TUNDE COPE on 11/3/21 1450


   Last Action: Continued on 11/16/21 1557 by ANTOINETTE WILSON


Empagliflozin (Jardiance) 25 Mg Tablet, 25 MG PO DAILY for control blood sugar, 

(Reported)


   Entered as Reported by: DAISY BLUM on 10/29/20 1458


   Last Action: Converted on 11/16/21 1557 by ANTOINETTE WILSON


Ezetimibe (Zetia) 10 Mg Tablet, 10 MG PO DAILY for lower cholesterol, (Reported)


   Entered as Reported by: DAISY BLUM on 10/29/20 1503


   Last Action: Continued on 11/16/21 1557 by ANTOINETTE WILSON


Febuxostat (Uloric) 80 Mg Tablet, 1 TAB PO DAILY for gout for 30 Days, #30 Ref 0

(Reported)


   Entered as Reported by: DAISY BLUM on 10/29/20 1515


   Last Action: Converted on 11/16/21 1557 by ANTIONETTE WILSON


Hydralazine Hcl (Hydralazine Hcl) 50 Mg Tablet, 1 TAB PO DAILY for lower blood 

pressure, #90 Ref 5 (Reported)


   Entered as Reported by: DAISY BLUM on 10/29/20 1515


   Last Action: Continued on 11/16/21 1557 by ANTOINETTE WILSON


Hydrochlorothiazide (Hydrochlorothiazide Capsule  **) 12.5 Mg Capsule, 12.5 MG 

PO DAILY for DIURETIC, Ref 0 (Reported)


   Entered as Reported by: DAISY BLUM on 10/29/20 1515


   Last Action: Continued on 11/16/21 1557 by ANTOINETTE WILSON


Icosapent Ethyl (Vascepa) 1 Gm Capsule, 1 GM PO BID for  , (Reported)


   Entered as Reported by: Luis Jones on 10/27/21 1323


   Last Action: Converted on 11/16/21 1557 by ANTOINETTE WILSON


Lactobacillus Rhamnosus Gg (Culturelle) 1 Each Cap.sprink, 1 CAP PO BID for 

probiotic for 42 Days, #84


   Prescribed by: AMY CRUZ MD on 11/24/21 1213


Nebivolol Hcl (Bystolic) 20 Mg Tablet, 20 MG PO DAILY for lower blood pressure, 

(Reported)


   Entered as Reported by: DAISY BLUM on 10/29/20 1515


   Last Action: Converted on 11/16/21 1557 by ANTOINETTE WILSON


Probenecid (Probenecid) 500 Mg Tablet, 500 MG PO BID for  , (Reported)


   Entered as Reported by: Luis Jones on 10/27/21 1322


   Last Action: Converted on 11/16/21 1557 by ANTOINETTE WILSON


Semaglutide (Ozempic) 1 Mg/0.75 Ml Pen.injctr, 1 MG SQ WEEKLY for control blood 

sugar, (Reported)


   SATURDAY 


   Entered as Reported by: DAISY BLUM on 10/29/20 1458


   Last Action: Converted on 11/16/21 1557 by ANTOINETTE WILSON





Scheduled PRN


Hydrocodone Bit/Acetaminophen (Hydrocodone-Apap 7.5-325  **) 1 Tab Tablet, 1-2 

TAB PO PRN Q4HRS PRN for PAIN, Ref 0 (Reported)


   Entered as Reported by: Luis Jones on 10/27/21 1325


   Last Action: Continued on 11/16/21 1557 by ANTOINETTE WILSON


Methocarbamol (Methocarbamol) 750 Mg Tablet, 750 MG PO TID PRN PRN for MUSCLE 

SPASMS, #60


   Prescribed by: TUNDE COPE on 11/3/21 1449


   Last Action: Continued on 11/16/21 1557 by ANTOINETTE WILSON


Zolpidem Tartrate (Zolpidem Tartrate Er) 12.5 Mg Tab.mphase, 12.5 MG PO PRN QHS 

PRN for INSOMNIA, Ref 0 (Reported)


   Entered as Reported by: Luis Jones on 10/27/21 1323


   Last Action: Converted on 11/16/21 1557 by ANTOINETTE WILSON





Discontinued Medications


Clonidine (Clonidine Tts-2  **) 1 Each Patch.tdwk, 1 PATCH TD WEEKLY for 

HYPERTENSION, (Reported)


   Entered as Reported by: DAISY BLUM on 10/29/20 1515


   Last Taken: Unknown Dose on 11/9/21      Last Action: Continued on 11/16/21 1557 by ANTOINETTE WILSON


Lisinopril (Zestril) 20 Mg Tablet, 1 TAB PO DAILY for lower blood pressure for 

30 Days, #30 Ref 0 (Reported)


   Entered as Reported by: DAISY BLUM on 10/29/20 1458


   Last Action: Continued on 11/16/21 1557 by ANTOINETTE WILSON





Durable Medical Equipment


[V-Go 40 Day Kit ]  , UNITS SQ Q1HR, #1.67 (Reported), (DME)


   5 CLICKS @ MEALS 2 CLICKS @ SNACKS 


   Entered as Reported by: Luis Jones on 10/27/21 1331


   Last Action: Reviewed on 11/16/21 1557 by ANTOINETTE WILSON





Justicifation of Admission Dx:


Justifications for Admission:


Justification of Admission Dx:  Yes











AMY CRUZ MD        Nov 24, 2021 12:18

## 2021-11-24 NOTE — SNU/HH DC
DISCHARGE WITH HOME HEALTH


DISCHARGE INFORMATION:


Discharge Date:  Nov 24, 2021


Final Diagnosis:


Staph spine infection


Condition on Discharge:  Stable





CODE STATUS:


Code Status:  Full





HOME HEALTH:


Face to Face:


I certify this patient is under my care and that I, or a nurse practitioner or 

physician's assistant working with me, had a face to face encounter that meets 

the physician face to face encounter requirements with this patient on 

11/24/2021.


Medical Complications:  DM, HTN, Other (Infection)


Skilled Nursing For:  Admin/Educate Injections, Diabetic Care, IV Infusion 

Therapy, Medication Management


RN For Eval/Treatment:  Yes


Pt Meets Homebound Status:  Limited distance walking





POST DISCHARGE ORDERS:


Activity Instructions for Disc:  Activity as tolerated, Walk in house


Weight Bearing Status after Di:  No restrictions


Bathing Instructions:  Shower-keep dressing dry, No Tub Bath until see 


DIET AFTER DISCHARGE:  ADA


Wound/Incision Care:  Ice to area for comfort, Keep wound/cast CDI, Keep wound 

elevated





FOLLOW-UP:


Additional Instructions:


[facility]


Doctors: Belinda Zelaya


 [address] [place] [address], [address]


 [place] [address]


 Phone: [phone] 





Dr. Cope


[facility]


[address]


[place] [address]


[phone]





 [facility] follow up 2 weeks


[address], [address]


[place] [address]


P: [phone]


F: [phone]


Fax weekly CBC BUN/creatinine CPK ESR and CRP [id number] attention Dr. Lagos





TREATMENT/EQUIPMENT ORDERS:


Adaptive Equipment Issued:  None


Infusion Equipment, home use:  PICC Line





CERTIFICATION STATEMENT:


Certification Statement:


Certification Statement: Based on the above finding, I certify that this patient

is confined to the home and needs intermittent skilled nursing care, physical 

therapy and/or speech therapy, or continues to need occupational therapy.~ This 

patient is under my care, and I have initiated the establishment of the plan of 

care.~ This patient will be followed by myself or a community physician who will

periodically review the plan of care.


Home Meds


Active Scripts


Clonidine Hcl (CLONIDINE HCL) 0.2 Mg Tablet, 1 TAB PO BID for HTN for 30 Days, 

#60 TAB 2 Refills


   Prov:AMY CRUZ MD         11/24/21


Amlodipine Besylate (AMLODIPINE BESYLATE) 10 Mg Tablet, 10 MG PO DAILY for HTN 

for 30 Days, #30 TAB 2 Refills


   Prov:AMY CRUZ MD         11/24/21


Lactobacillus Rhamnosus Gg (CULTURELLE) 1 Each Cap.sprink, 1 CAP PO BID for 

probiotic for 42 Days, #84 CAP


   Prov:AMY CRUZ MD         11/24/21


Daptomycin (Daptomycin) 350 Mg Vial, 600 MG IV DAILY for Spine staph infection 

for 42 Days, #72 EACH


   Prov:AMY CRUZ MD         11/24/21


Docusate Sodium (COLACE) 100 Mg Capsule, 100 MG PO BID for constipation for 60 

Days, #120 CAP


   Prov:TUNDE COPE MD         11/3/21


Methocarbamol (METHOCARBAMOL) 750 Mg Tablet, 750 MG PO TID PRN PRN for MUSCLE 

SPASMS, #60 TAB


   Prov:TUNDE COPE MD         11/3/21


Reported Medications


[V-Go 40 Day Kit ]   No Conflict Check, UNITS SQ Q1HR, #1.67


   5 CLICKS @ MEALS


   2 CLICKS @ SNACKS


   10/27/21


Blood Sugar Diagnostic (Freestyle Lite Test Strip) 1 Each Strip, 1 STRIP MC 

DAILY for CHECK BLOOD SUGAR for 30 Days, #30 STRIP 0 Refills


   10/27/21


Hydrocodone Bit/Acetaminophen (HYDROCODONE-APAP 7.5-325  **) 1 Tab Tablet, 1-2 

TAB PO PRN Q4HRS PRN for PAIN, TAB 0 Refills


   10/27/21


Icosapent Ethyl (VASCEPA) 1 Gm Capsule, 1 GM PO BID for  , CAP


   10/27/21


Zolpidem Tartrate (ZOLPIDEM TARTRATE ER) 12.5 Mg Tab.mphase, 12.5 MG PO PRN QHS 

PRN for INSOMNIA, TAB 0 Refills


   10/27/21


Probenecid (PROBENECID) 500 Mg Tablet, 500 MG PO BID for  , TAB


   10/27/21


Hydrochlorothiazide (HYDROCHLOROTHIAZIDE CAPSULE  **) 12.5 Mg Capsule, 12.5 MG 

PO DAILY for DIURETIC, CAP 0 Refills


   10/29/20


Hydralazine Hcl (HYDRALAZINE HCL) 50 Mg Tablet, 1 TAB PO DAILY for lower blood 

pressure, #90 TAB 5 Refills


   10/29/20


Febuxostat (ULORIC) 80 Mg Tablet, 1 TAB PO DAILY for gout for 30 Days, #30 TAB 0

Refills


   10/29/20


Nebivolol Hcl (BYSTOLIC) 20 Mg Tablet, 20 MG PO DAILY for lower blood pressure, 

TAB


   10/29/20


Aspirin (ASPIRIN) 81 Mg Tab.chew, 1 TAB PO DAILY for thin blood, #30 TAB 3 Refil

ls


   10/29/20


Ezetimibe (ZETIA) 10 Mg Tablet, 10 MG PO DAILY for lower cholesterol, TAB


   10/29/20


Semaglutide (Ozempic) 1 Mg/0.75 Ml Pen.injctr, 1 MG SQ WEEKLY for control blood 

sugar, EACH


   SATURDAY


   10/29/20


Atorvastatin Calcium (LIPITOR) 80 Mg Tablet, 1 TAB PO DAILY for control 

cholesterol, #30 TAB 5 Refills


   10/29/20


Empagliflozin (Jardiance) 25 Mg Tablet, 25 MG PO DAILY for control blood sugar, 

TAB


   10/29/20


Discontinued Reported Medications


Clonidine (CLONIDINE TTS-2  **) 1 Each Patch.tdwk, 1 PATCH TD WEEKLY for 

HYPERTENSION, PATCH


   10/29/20


Lisinopril (ZESTRIL) 20 Mg Tablet, 1 TAB PO DAILY for lower blood pressure for 

30 Days, #30 TAB 0 Refills


   10/29/20











AMY CRUZ MD        Nov 24, 2021 12:16